# Patient Record
Sex: FEMALE | Race: WHITE | Employment: OTHER | URBAN - METROPOLITAN AREA
[De-identification: names, ages, dates, MRNs, and addresses within clinical notes are randomized per-mention and may not be internally consistent; named-entity substitution may affect disease eponyms.]

---

## 2018-09-19 RX ORDER — LORAZEPAM 0.5 MG/1
0.5 TABLET ORAL
COMMUNITY
End: 2020-07-22 | Stop reason: CLARIF

## 2018-09-20 ENCOUNTER — HOSPITAL ENCOUNTER (OUTPATIENT)
Age: 78
Setting detail: OUTPATIENT SURGERY
Discharge: HOME | End: 2018-09-20
Attending: SPECIALIST | Admitting: SPECIALIST
Payer: MEDICARE

## 2018-09-20 ENCOUNTER — ANESTHESIA EVENT (OUTPATIENT)
Dept: ENDOSCOPY | Age: 78
End: 2018-09-20
Payer: MEDICARE

## 2018-09-20 ENCOUNTER — ANESTHESIA (OUTPATIENT)
Dept: ENDOSCOPY | Age: 78
End: 2018-09-20
Payer: MEDICARE

## 2018-09-20 VITALS
WEIGHT: 119.19 LBS | HEART RATE: 67 BPM | BODY MASS INDEX: 20.35 KG/M2 | DIASTOLIC BLOOD PRESSURE: 64 MMHG | TEMPERATURE: 97.5 F | RESPIRATION RATE: 19 BRPM | OXYGEN SATURATION: 100 % | SYSTOLIC BLOOD PRESSURE: 134 MMHG | HEIGHT: 64 IN

## 2018-09-20 PROCEDURE — 88305 TISSUE EXAM BY PATHOLOGIST: CPT | Performed by: SPECIALIST

## 2018-09-20 PROCEDURE — 74011000250 HC RX REV CODE- 250

## 2018-09-20 PROCEDURE — 74011250636 HC RX REV CODE- 250/636: Performed by: SPECIALIST

## 2018-09-20 PROCEDURE — 76060000032 HC ANESTHESIA 0.5 TO 1 HR: Performed by: SPECIALIST

## 2018-09-20 PROCEDURE — 77030019988 HC FCPS ENDOSC DISP BSC -B: Performed by: SPECIALIST

## 2018-09-20 PROCEDURE — 76040000007: Performed by: SPECIALIST

## 2018-09-20 PROCEDURE — 77030013992 HC SNR POLYP ENDOSC BSC -B: Performed by: SPECIALIST

## 2018-09-20 PROCEDURE — 74011250636 HC RX REV CODE- 250/636

## 2018-09-20 RX ORDER — EPINEPHRINE 0.1 MG/ML
1 INJECTION INTRACARDIAC; INTRAVENOUS
Status: DISCONTINUED | OUTPATIENT
Start: 2018-09-20 | End: 2018-09-20 | Stop reason: HOSPADM

## 2018-09-20 RX ORDER — MIDAZOLAM HYDROCHLORIDE 1 MG/ML
.25-5 INJECTION, SOLUTION INTRAMUSCULAR; INTRAVENOUS
Status: DISCONTINUED | OUTPATIENT
Start: 2018-09-20 | End: 2018-09-20 | Stop reason: HOSPADM

## 2018-09-20 RX ORDER — FENTANYL CITRATE 50 UG/ML
50 INJECTION, SOLUTION INTRAMUSCULAR; INTRAVENOUS
Status: DISCONTINUED | OUTPATIENT
Start: 2018-09-20 | End: 2018-09-20 | Stop reason: HOSPADM

## 2018-09-20 RX ORDER — SODIUM CHLORIDE 0.9 % (FLUSH) 0.9 %
5-10 SYRINGE (ML) INJECTION AS NEEDED
Status: DISCONTINUED | OUTPATIENT
Start: 2018-09-20 | End: 2018-09-20 | Stop reason: HOSPADM

## 2018-09-20 RX ORDER — GLYCOPYRROLATE 0.2 MG/ML
INJECTION INTRAMUSCULAR; INTRAVENOUS AS NEEDED
Status: DISCONTINUED | OUTPATIENT
Start: 2018-09-20 | End: 2018-09-20 | Stop reason: HOSPADM

## 2018-09-20 RX ORDER — PROPOFOL 10 MG/ML
INJECTION, EMULSION INTRAVENOUS AS NEEDED
Status: DISCONTINUED | OUTPATIENT
Start: 2018-09-20 | End: 2018-09-20 | Stop reason: HOSPADM

## 2018-09-20 RX ORDER — NALOXONE HYDROCHLORIDE 0.4 MG/ML
0.4 INJECTION, SOLUTION INTRAMUSCULAR; INTRAVENOUS; SUBCUTANEOUS
Status: DISCONTINUED | OUTPATIENT
Start: 2018-09-20 | End: 2018-09-20 | Stop reason: SDUPTHER

## 2018-09-20 RX ORDER — LIDOCAINE HYDROCHLORIDE 20 MG/ML
INJECTION, SOLUTION EPIDURAL; INFILTRATION; INTRACAUDAL; PERINEURAL AS NEEDED
Status: DISCONTINUED | OUTPATIENT
Start: 2018-09-20 | End: 2018-09-20 | Stop reason: HOSPADM

## 2018-09-20 RX ORDER — ATROPINE SULFATE 0.1 MG/ML
0.5 INJECTION INTRAVENOUS
Status: DISCONTINUED | OUTPATIENT
Start: 2018-09-20 | End: 2018-09-20 | Stop reason: HOSPADM

## 2018-09-20 RX ORDER — FLUMAZENIL 0.1 MG/ML
0.2 INJECTION INTRAVENOUS
Status: DISCONTINUED | OUTPATIENT
Start: 2018-09-20 | End: 2018-09-20 | Stop reason: SDUPTHER

## 2018-09-20 RX ORDER — MIDAZOLAM HYDROCHLORIDE 1 MG/ML
.25-5 INJECTION, SOLUTION INTRAMUSCULAR; INTRAVENOUS
Status: DISCONTINUED | OUTPATIENT
Start: 2018-09-20 | End: 2018-09-20 | Stop reason: SDUPTHER

## 2018-09-20 RX ORDER — FLUMAZENIL 0.1 MG/ML
0.2 INJECTION INTRAVENOUS
Status: DISCONTINUED | OUTPATIENT
Start: 2018-09-20 | End: 2018-09-20 | Stop reason: HOSPADM

## 2018-09-20 RX ORDER — SODIUM CHLORIDE 0.9 % (FLUSH) 0.9 %
5-10 SYRINGE (ML) INJECTION EVERY 8 HOURS
Status: DISCONTINUED | OUTPATIENT
Start: 2018-09-20 | End: 2018-09-20 | Stop reason: HOSPADM

## 2018-09-20 RX ORDER — NALOXONE HYDROCHLORIDE 0.4 MG/ML
0.4 INJECTION, SOLUTION INTRAMUSCULAR; INTRAVENOUS; SUBCUTANEOUS
Status: DISCONTINUED | OUTPATIENT
Start: 2018-09-20 | End: 2018-09-20 | Stop reason: HOSPADM

## 2018-09-20 RX ORDER — SODIUM CHLORIDE 9 MG/ML
100 INJECTION, SOLUTION INTRAVENOUS CONTINUOUS
Status: DISCONTINUED | OUTPATIENT
Start: 2018-09-20 | End: 2018-09-20 | Stop reason: SDUPTHER

## 2018-09-20 RX ORDER — DEXTROMETHORPHAN/PSEUDOEPHED 2.5-7.5/.8
1.2 DROPS ORAL
Status: DISCONTINUED | OUTPATIENT
Start: 2018-09-20 | End: 2018-09-20 | Stop reason: HOSPADM

## 2018-09-20 RX ADMIN — PROPOFOL 50 MG: 10 INJECTION, EMULSION INTRAVENOUS at 09:46

## 2018-09-20 RX ADMIN — PROPOFOL 30 MG: 10 INJECTION, EMULSION INTRAVENOUS at 09:54

## 2018-09-20 RX ADMIN — SODIUM CHLORIDE 100 ML/HR: 900 INJECTION, SOLUTION INTRAVENOUS at 09:41

## 2018-09-20 RX ADMIN — PROPOFOL 30 MG: 10 INJECTION, EMULSION INTRAVENOUS at 09:51

## 2018-09-20 RX ADMIN — PROPOFOL 30 MG: 10 INJECTION, EMULSION INTRAVENOUS at 10:05

## 2018-09-20 RX ADMIN — PROPOFOL 30 MG: 10 INJECTION, EMULSION INTRAVENOUS at 09:49

## 2018-09-20 RX ADMIN — PROPOFOL 30 MG: 10 INJECTION, EMULSION INTRAVENOUS at 10:02

## 2018-09-20 RX ADMIN — PROPOFOL 30 MG: 10 INJECTION, EMULSION INTRAVENOUS at 10:00

## 2018-09-20 RX ADMIN — LIDOCAINE HYDROCHLORIDE 20 MG: 20 INJECTION, SOLUTION EPIDURAL; INFILTRATION; INTRACAUDAL; PERINEURAL at 09:46

## 2018-09-20 RX ADMIN — PROPOFOL 30 MG: 10 INJECTION, EMULSION INTRAVENOUS at 10:10

## 2018-09-20 RX ADMIN — PROPOFOL 30 MG: 10 INJECTION, EMULSION INTRAVENOUS at 09:58

## 2018-09-20 RX ADMIN — PROPOFOL 30 MG: 10 INJECTION, EMULSION INTRAVENOUS at 10:16

## 2018-09-20 RX ADMIN — GLYCOPYRROLATE 0.2 MG: 0.2 INJECTION INTRAMUSCULAR; INTRAVENOUS at 10:03

## 2018-09-20 RX ADMIN — PROPOFOL 30 MG: 10 INJECTION, EMULSION INTRAVENOUS at 09:56

## 2018-09-20 RX ADMIN — PROPOFOL 30 MG: 10 INJECTION, EMULSION INTRAVENOUS at 09:52

## 2018-09-20 NOTE — PROCEDURES
Colonoscopy    Indications: family history colon cancer    Pre-operative Diagnosis: see above    Medications:  See anesthesia form    Post-operative Diagnosis:  Possible sigmoid polyp, could not be seen again to be removed. Procedure Details   Prior to the procedure its objectives, risks, consequences and alternatives were discussed with the patient who then elected to proceed. All questions were answered. Digital Rectal Exam:  was normal     The Olympus videocolonoscope was inserted in the rectum and advanced to the cecum. The cecum was identified by typical landmarks. The colonoscope was slowly and carefully withdrawn as the mucosa was inspected. Initially I had seen a six mm sessile polyp at about 35 cm in the sigmoid colon. Upon removal of the scope, this polyp could not be seen. No other abnormalities were noted. Retroflexion in the rectum was not performed due to small rectal vault. Photos to document the ileocecal valve, appendiceal orifice and retroflexion exam were obtained. The preparation was adequate      Estimated Blood Loss:  none    Specimens:  none    Findings:  Possible sigmoid polyp, not re-located    Complications:  none    Repeat colonoscopy is recommended in: Three years.                Last Alvarenga MD  10:18 AM  9/20/2018

## 2018-09-20 NOTE — ROUTINE PROCESS
Cindi Presser  1940  [id number]    Situation:  Verbal report received from: Loli Bell RN  Procedure: Procedure(s):  ESOPHAGOGASTRODUODENOSCOPY (EGD)  COLONOSCOPY  ESOPHAGOGASTRODUODENAL (EGD) BIOPSY  ENDOSCOPIC POLYPECTOMY    Background:    Preoperative diagnosis: epigastric pain, history of peptic ulcer, gerd, abnormal weight loss  Postoperative diagnosis: duodenal polyp; possible sigmoid colon polyp    :  Dr. Kiersten Gomez  Assistant(s): Endoscopy RN-1: Rena Dang; Patricia De La Rosa RN    Specimens:   ID Type Source Tests Collected by Time Destination   1 : duodenum polyp and biopsy for pathology Preservative Duodenum  Lennie Mckeon MD 9/20/2018 1004 Pathology   2 : gastric biopsy for pathology Preservative Gastric  Lennie Mckeon MD 9/20/2018 1007 Pathology   3 : mid esophagus biopsy for pathology Preservative Esophagus, Mid  Lennie Mckeon MD 9/20/2018 1007 Pathology     H. Pylori  no    Assessment:  Intra-procedure medications       Anesthesia gave intra-procedure sedation and medications, see anesthesia flow sheet yes    Intravenous fluids: NS@ KVO     Vital signs stable       Abdominal assessment: round and soft       Recommendation:  Discharge patient per MD order    Family or Chucky Pak   Permission to share finding with family or friend yes

## 2018-09-20 NOTE — H&P
Pre-endoscopy H and P    The patient was seen and examined in the endoscopy suite. The airway was assessed and docuemented. The problem list, past medical history, and medications were reviewed. Patient Active Problem List   Diagnosis Code    Family history of colon cancer requiring screening colonoscopy Z80.0    Dyspepsia R10.13     Social History     Social History    Marital status:      Spouse name: N/A    Number of children: N/A    Years of education: N/A     Occupational History    Not on file. Social History Main Topics    Smoking status: Never Smoker    Smokeless tobacco: Never Used    Alcohol use No    Drug use: No    Sexual activity: Not on file     Other Topics Concern    Not on file     Social History Narrative     Past Medical History:   Diagnosis Date    Arthritis     Family history of colon cancer requiring screening colonoscopy 1/29/2013    GERD (gastroesophageal reflux disease)     PUD (peptic ulcer disease)      The patient has a family history of na    Prior to Admission Medications   Prescriptions Last Dose Informant Patient Reported? Taking? BISOPROLOL FUMARATE PO   Yes Yes   Sig: Take 2.5 mg by mouth daily. CALCIUM CARBONATE/VITAMIN D3 (CALCIUM + D PO)   Yes No   Sig: Take 1 Tab by mouth daily. LORazepam (ATIVAN) 0.5 mg tablet   Yes Yes   Sig: Take 0.5 mg by mouth nightly. fish oil-omega-3 fatty acids (FISH OIL) 340-1,000 mg capsule   Yes Yes   Sig: Take 1 Cap by mouth daily. multivitamins-minerals-lutein (CENTRUM SILVER) Tab   Yes No   Sig: Take 1 Tab by mouth every other day. pantoprazole (PROTONIX) 40 mg tablet   No No   Sig: TAKE 1 TABLET DAILY   Patient taking differently: TAKE 1 TABLET EVERY OTHER DAY   ranitidine (ZANTAC) 75 mg tablet   Yes No   Sig: Take 75 mg by mouth two (2) times a day.  Once every other day      Facility-Administered Medications: None           The review of systems is:  negative for shortness of breath or chest pain      The heart, lungs, and mental status were satisfactory for the administration of anesthesia sedation and for the procedure. The history is: Mother had colon cancer. Patient's last colonoscopy was 1/29/13 by Dr. Darcy Mckeon and was negative for polyps. 5 yr recall was recommended. She also had colonoscopy neg for polyps in 2007. BM's are good. No blood in the stool. No anemia. She has had gastric ulcers due to Excedrin use in the past in 2005 and 2007. No hx of h. pylori. EGD in 2013 negative. She has also been treated for GERD. She alternates between Protonix and OTC Zantac ever other day due to concerns of osteoporosis. She has osteopenia. A few weeks ago, she developed a burning in her stomach/epigastrium. Denies NSAID use presently. May be worse after eating certain foods, ie, strawberries, oranges. Also wonders if it may be due to stress as she's recently started estate planning. It hasn't happened that often and isn't that bad. It lasts a short time. She has not taken anything for it. The most coffee she drinks is 2 cups per day. ETOH use is erratic and never more than 1 glass at a time. She went back to Protonix daily 1 week ago. She's had 1 incident in the past week. Has lost weight, 5-10 lbs in the past 6 months unintentionally. It did start after eliminating cookies and cakes for [employer]le[employer]ells and she did regain a little of it. Occasionally gets a slight pain in her chest. Occasional hoarseness. Saw ENT in 2015 for chronic cough. He recommended elevating HOB and attributed cough to GERD plus post nasal drip. Cough now is occasional. Worse in AM and when going to bed. Worse after grainy foods, ie, nuts, breads. No choking or dysphagia    I discussed with the patient the objectives, risks, consequences and alternatives to the procedure.       Kylie Cheng MD  9/20/2018  9:32 AM

## 2018-09-20 NOTE — ANESTHESIA PREPROCEDURE EVALUATION
Anesthetic History No history of anesthetic complications Review of Systems / Medical History Patient summary reviewed, nursing notes reviewed and pertinent labs reviewed Pulmonary Within defined limits Neuro/Psych Within defined limits Cardiovascular Within defined limits Exercise tolerance: >4 METS 
  
GI/Hepatic/Renal 
Within defined limits GERD 
 
 
PUD Endo/Other Within defined limits Arthritis Other Findings Physical Exam 
 
Airway Mallampati: II 
TM Distance: 4 - 6 cm Neck ROM: normal range of motion Mouth opening: Normal 
 
 Cardiovascular Regular rate and rhythm,  S1 and S2 normal,  no murmur, click, rub, or gallop Dental 
No notable dental hx Pulmonary Breath sounds clear to auscultation Abdominal 
GI exam deferred Other Findings Anesthetic Plan ASA: 2 Anesthesia type: general and total IV anesthesia Induction: Intravenous Anesthetic plan and risks discussed with: Patient Propofol MAC

## 2018-09-20 NOTE — DISCHARGE INSTRUCTIONS
Lily Keenan  [id number]  1940              Procedure  Discharge Instructions:      Discomfort:  Redness at IV site- apply warm compress to area; if redness or soreness persist- contact your physician  There may be a slight amount of blood passed from the rectum  Gaseous discomfort- walking, belching will help relieve any discomfort  You may not operate a vehicle for 12 hours  You may not engage in an occupation involving machinery or appliances for rest of today  You may not drink alcoholic beverages for at least 12 hours  Avoid making any critical decisions for at least 24 hour  DIET:   You may resume your normal diet today. You should not overeat or \"feast\" today as your abdomen may become distended or uncomfortable. MEDICATIONS:   I reconciled this list from the list you gave us when you came today for the procedure. Please clarify with me, your primary care physician and the nurse who is discharging you if we have any discrepancies. Aspirin and or non-steroidal medication (Ibuprofen, Motrin, naproxen, etc.) is ok in limited quantities. ACTIVITY:  You may resume your normal daily activities it is recommended that you spend the remainder of the day resting -  avoid any strenuous activity. CALL M.D. ANY SIGN OF:  Increasing pain, nausea, vomiting  Abdominal distension (swelling)  New increased bleeding (oral or rectal)  Fever (chills)  Pain in chest area  Bloody discharge from nose or mouth  Shortness of breath          Follow-up Instructions:   Call Dr. Vipin Berkowitz for the results of  biopsy in approximately one week  Telephone #  [phone]  Follow up visit as needed; recall 3 years.       Anitha Teresa MD  10:20 AM  9/20/2018

## 2018-09-20 NOTE — ANESTHESIA POSTPROCEDURE EVALUATION
Post-Anesthesia Evaluation and Assessment Patient: Breanne Johnson MRN: [id number]  SSN: [id number] YOB: 1940  Age: 70 y.o. Sex: female Cardiovascular Function/Vital Signs Visit Vitals  /64  Pulse 67  Temp 36.4 °C (97.5 °F)  Resp 19  
 Ht 5' 4\" (1.626 m)  Wt 54.1 kg (119 lb 3 oz)  SpO2 100%  Breastfeeding No  
 BMI 20.46 kg/m2 Patient is status post general, total IV anesthesia anesthesia for Procedure(s): ESOPHAGOGASTRODUODENOSCOPY (EGD) COLONOSCOPY 
ESOPHAGOGASTRODUODENAL (EGD) BIOPSY ENDOSCOPIC POLYPECTOMY. Nausea/Vomiting: None Postoperative hydration reviewed and adequate. Pain: 
Pain Scale 1: Numeric (0 - 10) (09/20/18 1047) Pain Intensity 1: 0 (09/20/18 1047) Managed Neurological Status: At baseline Mental Status and Level of Consciousness: Arousable Pulmonary Status:  
O2 Device: Room air (09/20/18 1049) Adequate oxygenation and airway patent Complications related to anesthesia: None Post-anesthesia assessment completed. No concerns Signed By: Perla Flaherty MD   
 September 20, 2018

## 2018-09-20 NOTE — PROCEDURES
Esophagogastroduodenoscopy    Indications:  Epigastric pain  Diogenes symptoms    Medications:  See anesthesia form    Post procedure diagnosis:  8mm duodenal polyp; otherwise negative exam  Description of Procedure:    Prior to the procedure its objectives, risks, consequences and alternatives were discussed with the patient who then elected to proceed. The Olympus video endoscope was inserted under direct vision into the mouth and then into the esophagus. The esophagus looked normal.    The z-line was located at 39cm. There were no diagnostic abnormalities of the body, fundus, antrum, cardia and incisura of the stomach. This included direct and retroflexion examination. The mucosa of the first and second portion of the duodenum appeared normal.  There was an 8mm sessile polyp in the second portion of the duodenum. I took biopsies of the duodenum (including the polyp), stomach, and the mid-esophagus. Complications: There were no apparent complications and the patient tolerated the procedure well.         Estimated Blood Loss:  none  Specimens Removed:  Duodenum and polyp  Stomach  Mid esophagus  Impressions:  See above       Signed By: Yeison Noel MD                        September 20, 2018     9:59 AM

## 2018-09-20 NOTE — IP AVS SNAPSHOT
[address] [facility] 
[phone] Patient: Sergio Awan MRN: [id number] QTK:1[birth year]33/[birth year]73 About your hospitalization You were admitted on:  September 20, 2018 You last received care in the:  [facility] ENDOSCOPY You were discharged on:  September 20, 2018 Why you were hospitalized Your primary diagnosis was:  Not on File Your diagnoses also included:  Family History Of Colon Cancer Requiring Screening Colonoscopy, Dyspepsia Follow-up Information Follow up With Details Comments Contact Info Kranthi Savage MD     
  
Discharge Orders None A check mike indicates which time of day the medication should be taken. My Medications CHANGE how you take these medications Instructions Each Dose to Equal  
 Morning Noon Evening Bedtime  
 pantoprazole 40 mg tablet Commonly known as:  PROTONIX What changed:  See the new instructions. Your last dose was: Your next dose is: TAKE 1 TABLET DAILY CONTINUE taking these medications Instructions Each Dose to Equal  
 Morning Noon Evening Bedtime BISOPROLOL FUMARATE PO Your last dose was: Your next dose is: Take 2.5 mg by mouth daily. 2.5 mg  
    
   
   
   
  
 CALCIUM + D PO Your last dose was: Your next dose is: Take 1 Tab by mouth daily. 1 Tab CENTRUM SILVER Tab tablet Generic drug:  multivitamins-minerals-lutein Your last dose was: Your next dose is: Take 1 Tab by mouth every other day. 1 Tab FISH -1,000 mg capsule Generic drug:  fish oil-omega-3 fatty acids Your last dose was: Your next dose is: Take 1 Cap by mouth daily. 1 Cap LORazepam 0.5 mg tablet Commonly known as:  ATIVAN  
   
 Your last dose was: Your next dose is: Take 0.5 mg by mouth nightly. 0.5 mg  
    
   
   
   
  
 raNITIdine 75 mg Tab Commonly known as:  ZANTAC Your last dose was: Your next dose is: Take 75 mg by mouth two (2) times a day. Once every other day 75 mg Discharge Instructions Sloane Cartagena 
[id number] 
1940 Procedure  Discharge Instructions:   
 
Discomfort: 
Redness at IV site- apply warm compress to area; if redness or soreness persist- contact your physician There may be a slight amount of blood passed from the rectum Gaseous discomfort- walking, belching will help relieve any discomfort You may not operate a vehicle for 12 hours You may not engage in an occupation involving machinery or appliances for rest of today You may not drink alcoholic beverages for at least 12 hours Avoid making any critical decisions for at least 24 hour DIET: 
 You may resume your normal diet today. You should not overeat or \"feast\" today as your abdomen may become distended or uncomfortable. MEDICATIONS: 
 I reconciled this list from the list you gave us when you came today for the procedure. Please clarify with me, your primary care physician and the nurse who is discharging you if we have any discrepancies. Aspirin and or non-steroidal medication (Ibuprofen, Motrin, naproxen, etc.) is ok in limited quantities. ACTIVITY: 
You may resume your normal daily activities it is recommended that you spend the remainder of the day resting -  avoid any strenuous activity. CALL M.D. ANY SIGN OF: Increasing pain, nausea, vomiting Abdominal distension (swelling) New increased bleeding (oral or rectal) Fever (chills) Pain in chest area Bloody discharge from nose or mouth Shortness of breath Follow-up Instructions: 
 Call Dr. Finesse Bello for the results of  biopsy in approximately one week Telephone #  [phone] Follow up visit as needed; recall 3 years. Uday Hewitt MD 
10:20 AM 
9/20/2018 Introducing [facility]! Dear Adriana Clark: Thank you for requesting a K[employer] account. Our records indicate that you already have an active Kryptiq account. You can access your account anytime at [email or web address] Did you know that you can access your hospital and ER discharge instructions at any time in Kryptiq? You can also review all of your test results from your hospital stay or ER visit. Additional Information If you have questions, please visit the Frequently Asked Questions section of the Kryptiq website at [email or web address] Remember, Kryptiq is NOT to be used for urgent needs. For medical emergencies, dial 911. Now available from your iPhone and Android! Introducing Joe Apodaca As a [employer] patient, I wanted to make you aware of our electronic visit tool called Joe Apodaca. [employer] 24/7 allows you to connect within minutes with a medical provider 24 hours a day, seven days a week via a mobile device or tablet or logging into a secure website from your computer. You can access Joe Apodaca from anywhere in the [place]. A virtual visit might be right for you when you have a simple condition and feel like you just dont want to get out of bed, or cant get away from work for an appointment, when your regular [employer] provider is not available (evenings, weekends or holidays), or when youre out of town and need minor care. Electronic visits cost only $49 and if the [employer] 24/7 provider determines a prescription is needed to treat your condition, one can be electronically transmitted to a nearby pharmacy*. Please take a moment to enroll today if you have not already done so. The enrollment process is free and takes just a few minutes.   To enroll, please download the [employer] 24/7 joseph to your tablet or phone, or visit [email or web address] to enroll on your computer. And, as an [address] [place] [address] patient with a [employer] account, the results of your visits will be scanned into your electronic medical record and your primary care provider will be able to view the scanned results. We urge you to continue to see your regular [employer] provider for your ongoing medical care. And while your primary care provider may not be the one available when you seek a [employer] virtual visit, the peace of mind you get from getting a real diagnosis real time can be priceless. For more information on NEXAGE, view our Frequently Asked Questions (FAQs) at [email or web address]. Sincerely, 
 
Jolly Basurto MD 
Chief Medical Officer [facility] Victorina Bhanu *:  certain medications cannot be prescribed via NEXAGE Providers Seen During Your Hospitalization Provider Specialty Primary office phone Kylie Cheng MD Gastroenterology [phone] Your Primary Care Physician (PCP) Primary Care Physician Office Phone Office Fax AZIZA TUCKER ** None ** ** None ** You are allergic to the following Allergen Reactions Doxycycline Other (comments) Difficulty swallowing Sulfa (Sulfonamide Antibiotics) Unknown (comments) Unknown reaction in past  
    
 Fiorinal-Codeine #3 (Codeine-Butalbital-Asa-Caff) Rash Recent Documentation Height Weight Breastfeeding? BMI OB Status Smoking Status 1.626 m 54.1 kg No 20.46 kg/m2 Postmenopausal Never Smoker Emergency Contacts Name Discharge Info Relation Home Work Mobile Shahbaz Zepeda  Spouse [3] [phone] Patient Belongings The following personal items are in your possession at time of discharge: 
  Dental Appliances: None  Visual Aid: Glasses (with ) Please provide this summary of care documentation to your next provider. Signatures-by signing, you are acknowledging that this After Visit Summary has been reviewed with you and you have received a copy. Patient Signature:  ____________________________________________________________ Date:  ____________________________________________________________  
  
[facility] Provider Signature:  ____________________________________________________________ Date:  ____________________________________________________________

## 2018-10-30 RX ORDER — PANTOPRAZOLE SODIUM 40 MG/1
40 TABLET, DELAYED RELEASE ORAL DAILY
COMMUNITY
End: 2020-05-28

## 2018-10-31 ENCOUNTER — ANESTHESIA (OUTPATIENT)
Dept: ENDOSCOPY | Age: 78
End: 2018-10-31
Payer: MEDICARE

## 2018-10-31 ENCOUNTER — APPOINTMENT (OUTPATIENT)
Dept: ULTRASOUND IMAGING | Age: 78
End: 2018-10-31
Attending: INTERNAL MEDICINE
Payer: MEDICARE

## 2018-10-31 ENCOUNTER — ANESTHESIA EVENT (OUTPATIENT)
Dept: ENDOSCOPY | Age: 78
End: 2018-10-31
Payer: MEDICARE

## 2018-10-31 ENCOUNTER — HOSPITAL ENCOUNTER (OUTPATIENT)
Age: 78
Setting detail: OUTPATIENT SURGERY
Discharge: HOME | End: 2018-10-31
Attending: INTERNAL MEDICINE | Admitting: INTERNAL MEDICINE
Payer: MEDICARE

## 2018-10-31 VITALS
SYSTOLIC BLOOD PRESSURE: 136 MMHG | HEART RATE: 88 BPM | BODY MASS INDEX: 20.49 KG/M2 | OXYGEN SATURATION: 100 % | RESPIRATION RATE: 18 BRPM | HEIGHT: 64 IN | DIASTOLIC BLOOD PRESSURE: 88 MMHG | TEMPERATURE: 97.5 F | WEIGHT: 120 LBS

## 2018-10-31 PROCEDURE — 77030036911 HC NDL INJ ENTERSCP FORCE MAX OCOA -B: Performed by: INTERNAL MEDICINE

## 2018-10-31 PROCEDURE — 74011250636 HC RX REV CODE- 250/636

## 2018-10-31 PROCEDURE — 77030013992 HC SNR POLYP ENDOSC BSC -B: Performed by: INTERNAL MEDICINE

## 2018-10-31 PROCEDURE — 77030010936 HC CLP LIG BSC -C: Performed by: INTERNAL MEDICINE

## 2018-10-31 PROCEDURE — 76060000032 HC ANESTHESIA 0.5 TO 1 HR: Performed by: INTERNAL MEDICINE

## 2018-10-31 PROCEDURE — 77030038665 HC SOL ELEVIEW INJ AGNT ARPH -B: Performed by: INTERNAL MEDICINE

## 2018-10-31 PROCEDURE — 76040000007: Performed by: INTERNAL MEDICINE

## 2018-10-31 PROCEDURE — 88305 TISSUE EXAM BY PATHOLOGIST: CPT | Performed by: INTERNAL MEDICINE

## 2018-10-31 RX ORDER — NALOXONE HYDROCHLORIDE 0.4 MG/ML
0.4 INJECTION, SOLUTION INTRAMUSCULAR; INTRAVENOUS; SUBCUTANEOUS
Status: DISCONTINUED | OUTPATIENT
Start: 2018-10-31 | End: 2018-10-31 | Stop reason: HOSPADM

## 2018-10-31 RX ORDER — MIDAZOLAM HYDROCHLORIDE 1 MG/ML
.25-1 INJECTION, SOLUTION INTRAMUSCULAR; INTRAVENOUS
Status: DISCONTINUED | OUTPATIENT
Start: 2018-10-31 | End: 2018-10-31 | Stop reason: HOSPADM

## 2018-10-31 RX ORDER — SODIUM CHLORIDE 9 MG/ML
50 INJECTION, SOLUTION INTRAVENOUS CONTINUOUS
Status: DISCONTINUED | OUTPATIENT
Start: 2018-10-31 | End: 2018-10-31 | Stop reason: HOSPADM

## 2018-10-31 RX ORDER — SODIUM CHLORIDE 0.9 % (FLUSH) 0.9 %
5-10 SYRINGE (ML) INJECTION EVERY 8 HOURS
Status: DISCONTINUED | OUTPATIENT
Start: 2018-10-31 | End: 2018-10-31 | Stop reason: HOSPADM

## 2018-10-31 RX ORDER — PROPOFOL 10 MG/ML
INJECTION, EMULSION INTRAVENOUS AS NEEDED
Status: DISCONTINUED | OUTPATIENT
Start: 2018-10-31 | End: 2018-10-31 | Stop reason: HOSPADM

## 2018-10-31 RX ORDER — ATROPINE SULFATE 0.1 MG/ML
0.5 INJECTION INTRAVENOUS
Status: DISCONTINUED | OUTPATIENT
Start: 2018-10-31 | End: 2018-10-31 | Stop reason: HOSPADM

## 2018-10-31 RX ORDER — FLUMAZENIL 0.1 MG/ML
0.2 INJECTION INTRAVENOUS
Status: DISCONTINUED | OUTPATIENT
Start: 2018-10-31 | End: 2018-10-31 | Stop reason: HOSPADM

## 2018-10-31 RX ORDER — SODIUM CHLORIDE 9 MG/ML
INJECTION, SOLUTION INTRAVENOUS
Status: DISCONTINUED | OUTPATIENT
Start: 2018-10-31 | End: 2018-10-31 | Stop reason: HOSPADM

## 2018-10-31 RX ORDER — LIDOCAINE HYDROCHLORIDE 20 MG/ML
INJECTION, SOLUTION EPIDURAL; INFILTRATION; INTRACAUDAL; PERINEURAL AS NEEDED
Status: DISCONTINUED | OUTPATIENT
Start: 2018-10-31 | End: 2018-10-31 | Stop reason: HOSPADM

## 2018-10-31 RX ORDER — EPINEPHRINE 0.1 MG/ML
1 INJECTION INTRACARDIAC; INTRAVENOUS
Status: DISCONTINUED | OUTPATIENT
Start: 2018-10-31 | End: 2018-10-31 | Stop reason: HOSPADM

## 2018-10-31 RX ORDER — FENTANYL CITRATE 50 UG/ML
100 INJECTION, SOLUTION INTRAMUSCULAR; INTRAVENOUS
Status: DISCONTINUED | OUTPATIENT
Start: 2018-10-31 | End: 2018-10-31 | Stop reason: HOSPADM

## 2018-10-31 RX ORDER — SODIUM CHLORIDE 0.9 % (FLUSH) 0.9 %
5-10 SYRINGE (ML) INJECTION AS NEEDED
Status: DISCONTINUED | OUTPATIENT
Start: 2018-10-31 | End: 2018-10-31 | Stop reason: HOSPADM

## 2018-10-31 RX ORDER — DEXTROMETHORPHAN/PSEUDOEPHED 2.5-7.5/.8
1.2 DROPS ORAL
Status: DISCONTINUED | OUTPATIENT
Start: 2018-10-31 | End: 2018-10-31 | Stop reason: HOSPADM

## 2018-10-31 RX ADMIN — PROPOFOL 50 MG: 10 INJECTION, EMULSION INTRAVENOUS at 13:56

## 2018-10-31 RX ADMIN — PROPOFOL 50 MG: 10 INJECTION, EMULSION INTRAVENOUS at 13:59

## 2018-10-31 RX ADMIN — PROPOFOL 50 MG: 10 INJECTION, EMULSION INTRAVENOUS at 13:52

## 2018-10-31 RX ADMIN — SODIUM CHLORIDE: 9 INJECTION, SOLUTION INTRAVENOUS at 13:05

## 2018-10-31 RX ADMIN — LIDOCAINE HYDROCHLORIDE 50 MG: 20 INJECTION, SOLUTION EPIDURAL; INFILTRATION; INTRACAUDAL; PERINEURAL at 13:49

## 2018-10-31 RX ADMIN — PROPOFOL 50 MG: 10 INJECTION, EMULSION INTRAVENOUS at 13:54

## 2018-10-31 RX ADMIN — PROPOFOL 50 MG: 10 INJECTION, EMULSION INTRAVENOUS at 13:50

## 2018-10-31 RX ADMIN — PROPOFOL 50 MG: 10 INJECTION, EMULSION INTRAVENOUS at 14:04

## 2018-10-31 NOTE — ANESTHESIA POSTPROCEDURE EVALUATION
Procedure(s): ESOPHAGOGASTRODUODENOSCOPY (EGD) ENDOSCOPIC MUCOSAL RESECTION INJECTION 
RESOLUTION CLIP. Anesthesia Post Evaluation Multimodal analgesia: multimodal analgesia not used between 6 hours prior to anesthesia start to PACU discharge Patient location during evaluation: PACU Patient participation: complete - patient participated Level of consciousness: awake Pain score: 0 Pain management: adequate Airway patency: patent Anesthetic complications: no 
Cardiovascular status: acceptable Respiratory status: acceptable Hydration status: acceptable Comments: I have evaluated the patient and meets criteria for discharge from PACU. Eri Jaffe MD 
 
 
 
Visit Vitals /88 Pulse 88 Temp 36.4 °C (97.5 °F) Resp 18 Ht 5' 4\" (1.626 m) Wt 54.4 kg (120 lb) SpO2 100% Breastfeeding? No  
BMI 20.60 kg/m²

## 2018-10-31 NOTE — PROGRESS NOTES

## 2018-10-31 NOTE — PROGRESS NOTES
Jelani Mali 1940 
[id number] Situation: 
Verbal report received from: [facility] AT [facility] 
Procedure: Procedure(s): ESOPHAGOGASTRODUODENOSCOPY (EGD) ENDOSCOPIC MUCOSAL RESECTION INJECTION 
RESOLUTION CLIP Background: 
 
Preoperative diagnosis: 1.- DUODENAL POLYPECTOMY Postoperative diagnosis: 1.- Duodenal Polyp :  Dr. Shiela Pineda 
Assistant(s): Endoscopy Technician-1: Casandra Arredondo Endoscopy RN-1: Rachelle Molina RN Specimens:  
ID Type Source Tests Collected by Time Destination 1 : Duodenal Polyp Preservative   Elsa Carey MD 10/31/2018 1402 Pathology H. Pylori  no Assessment: 
Intra-procedure medications Anesthesia gave intra-procedure sedation and medications, see anesthesia flow sheet yes Intravenous fluids: NS@ [address] Vital signs stable  yes Abdominal assessment: round and soft  yes Recommendation: 
Discharge patient per MD order yes. Family or Friend  yes Permission to share finding with family or friend yes

## 2018-10-31 NOTE — PROCEDURES
[address]  [address] [place]  [address], [address]   [phone]                            NAME:  Lauri Interiano   :   1940   MRN:   [id number]     Date/Time:  10/31/2018 2:11 PM    Esophagogastroduodenoscopy (EGD) Procedure Note    :  Reuben Coleman MD    Referring Provider:  Lico Campbell MD    Anethesia/Sedation:  MAC anesthesia    Procedure Details     After infom consent was obtained for the procedure, with all risks and benefits of procedure explained the patient was taken to the endoscopy suite and placed in the left lateral decubitus position. Following sequential administration of sedation as per above, the [id number] gastroscope was inserted into the mouth and advanced under direct vision to second portion of the duodenum. A careful inspection was made as the gastroscope was withdrawn, including a retroflexed view of the proximal stomach; findings and interventions are described below. Findings:  Esophagus:normal  Stomach:normal   Duodenum/jejunum: Small flat polyp with a scar in center from previous biopsy was seen in the second portion of duodenum on the posterior wall. This measured about 8 mm in size. Rest of the duodenum was normal      Therapies:  injection of 4 cc of Eleview was successful in lifting the lesion on both the sides of scar. The scar in the center did not lift well. 1 piece meal polypectomy were performed using hot snare  and the polyps were  retrieved   Two hemoclips were placed for hemostasis    Specimens: Duodenal polyp           EBL: Minimal    Complications:   None; patient tolerated the procedure well. Impression:    See Postoperative diagnosis above    Recommendations:  -Await pathology. , -Follow symptoms. , -Regular diet. , -No NSAIDS, -EGD surveillance in 3-6 months based upon the path results    Discharge disposition:  Home in the company of  when able to ambulate    Reuben Coleman MD

## 2018-10-31 NOTE — DISCHARGE INSTRUCTIONS
[address]  [address] [facility]  [phone]                     DISCHARGE INSTRUCTIONS    Carol Kowalski  [id number]  1940    DISCOMFORT:  Sore throat- warm salt water gargle  redness at IV site- apply warm compress to area; if redness or soreness persist- contact your physician  Gaseous discomfort- walking, belching will help relieve any discomfort  You may not operate a vehicle for 12 hours  You may not engage in an occupation involving machinery or appliances for rest of today  You may not drink alcoholic beverages for at least 12 hours  Avoid making any critical decisions for at least 24 hour  DIET  You may eat and drink after you leave. You may resume your regular diet - however -  remember your colon is empty and a heavy meal will produce gas. Avoid these foods:  vegetables, fried / greasy foods, carbonated drinks    ACTIVITY  You may resume your normal daily activities   Spend the remainder of the day resting -  avoid any strenuous activity. CALL M.D. ANY SIGN OF   Increasing pain, nausea, vomiting  Abdominal distension (swelling)  New increased bleeding (oral or rectal)  Fever (chills)  Bloody discharge from nose or mouth  Shortness of breath or chest pain call 911 then call Dr Ingrid Muro    Follow-up Instructions:   Call Dr. Ingrid Muro for any questions or problems. If we took a biopsy please call the office within 2 weeks to discuss your                             pathology results. Telephone # [phone]        Continue same medications. Await pathology. , -Follow symptoms. , -Regular diet. , -No NSAIDS, -EGD surveillance in 3-6 months based upon the path results

## 2018-10-31 NOTE — ANESTHESIA PREPROCEDURE EVALUATION
Anesthetic History No history of anesthetic complications Review of Systems / Medical History Patient summary reviewed, nursing notes reviewed and pertinent labs reviewed Pulmonary Within defined limits Neuro/Psych Within defined limits Cardiovascular Within defined limits Hypertension GI/Hepatic/Renal 
Within defined limits GERD 
 
 
PUD Endo/Other Within defined limits Arthritis Other Findings Physical Exam 
 
Airway Mallampati: II 
TM Distance: > 6 cm Neck ROM: normal range of motion Mouth opening: Normal 
 
 Cardiovascular Regular rate and rhythm,  S1 and S2 normal,  no murmur, click, rub, or gallop Dental 
No notable dental hx Pulmonary Breath sounds clear to auscultation Abdominal 
GI exam deferred Other Findings Anesthetic Plan ASA: 2 Anesthesia type: MAC Induction: Intravenous Anesthetic plan and risks discussed with: Patient

## 2018-10-31 NOTE — H&P
[address] [facility] 
[address] [place], [address] 
[phone] History and Physical  
 
NAME: Jelani Samson :  1940 MRN:  [id number] HPI:  The patient was seen and examined. Past Surgical History:  
Procedure Laterality Date  CHEST SURGERY PROCEDURE UNLISTED    
 removal of benign cyst from right lung  COLORECTAL SCRN; HI RISK IND  2018  HX APPENDECTOMY  HX DILATION AND CURETTAGE    
 miscarriage  HX TONSILLECTOMY  UPPER GI ENDOSCOPY,BIOPSY  2018 Past Medical History:  
Diagnosis Date  Arthritis  Chronic pain   
 arthritis/chronic headache daily  Family history of colon cancer requiring screening colonoscopy 2013  GERD (gastroesophageal reflux disease)  Hypertension  PUD (peptic ulcer disease) Social History Tobacco Use  Smoking status: Never Smoker  Smokeless tobacco: Never Used Substance Use Topics  Alcohol use: Yes Comment: occasional wine  Drug use: No  
 
Allergies Allergen Reactions  Doxycycline Other (comments) Difficulty swallowing  Sulfa (Sulfonamide Antibiotics) Unknown (comments) Unknown reaction in past  
 Fiorinal-Codeine #3 [Codeine-Butalbital-Asa-Caff] Rash Family History Problem Relation Age of Onset  Cancer Mother   
     colon  COPD Mother  Heart Disease Father  Other Father   
     arthritis Current Facility-Administered Medications Medication Dose Route Frequency  0.9% sodium chloride infusion  50 mL/hr IntraVENous CONTINUOUS  
 sodium chloride (NS) flush 5-10 mL  5-10 mL IntraVENous Q8H  
 sodium chloride (NS) flush 5-10 mL  5-10 mL IntraVENous PRN  
 midazolam (VERSED) injection 0.25-10 mg  0.25-10 mg IntraVENous Multiple  fentaNYL citrate (PF) injection 100 mcg  100 mcg IntraVENous MULTIPLE DOSE GIVEN  
  naloxone (NARCAN) injection 0.4 mg  0.4 mg IntraVENous Multiple  flumazenil (ROMAZICON) 0.1 mg/mL injection 0.2 mg  0.2 mg IntraVENous Multiple  simethicone (MYLICON) 42UJ/1.2XO oral drops 80 mg  1.2 mL Oral Multiple  atropine injection 0.5 mg  0.5 mg IntraVENous ONCE PRN  
 EPINEPHrine (ADRENALIN) 0.1 mg/mL syringe 1 mg  1 mg Endoscopically ONCE PRN Facility-Administered Medications Ordered in Other Encounters Medication Dose Route Frequency  0.9% sodium chloride infusion   IntraVENous CONTINUOUS  
 
 
 
PHYSICAL EXAM: 
General: WD, WN. Alert, cooperative, no acute distress   
HEENT: NC, Atraumatic. PERRLA, EOMI. Anicteric sclerae. Lungs:  CTA Bilaterally. No Wheezing/Rhonchi/Rales. Heart:  Regular  rhythm,  No murmur, No Rubs, No Gallops Abdomen: Soft, Non distended, Non tender.  +Bowel sounds, no HSM Extremities: No c/c/e Neurologic:  CN 2-12 gi, Alert and oriented X 3. No acute neurological distress Psych:   Good insight. Not anxious nor agitated. The heart, lungs and mental status were satisfactory for the administration of MAC sedation and for the procedure. Mallampati score: 2 Assessment:  
· Duodenal polyp Plan:  
· Endoscopic procedure · MAC sedation ·

## 2018-11-30 ENCOUNTER — HOSPITAL ENCOUNTER (OUTPATIENT)
Dept: MRI IMAGING | Age: 78
Discharge: HOME | End: 2018-11-30
Attending: PHYSICAL MEDICINE & REHABILITATION
Payer: MEDICARE

## 2018-11-30 DIAGNOSIS — M47.812 CERVICAL SPONDYLOSIS WITHOUT MYELOPATHY: ICD-10-CM

## 2018-11-30 PROCEDURE — 72141 MRI NECK SPINE W/O DYE: CPT

## 2019-02-28 ENCOUNTER — ANESTHESIA EVENT (OUTPATIENT)
Dept: ENDOSCOPY | Age: 79
End: 2019-02-28
Payer: MEDICARE

## 2019-02-28 ENCOUNTER — ANESTHESIA (OUTPATIENT)
Dept: ENDOSCOPY | Age: 79
End: 2019-02-28
Payer: MEDICARE

## 2019-02-28 ENCOUNTER — HOSPITAL ENCOUNTER (OUTPATIENT)
Age: 79
Setting detail: OUTPATIENT SURGERY
Discharge: HOME | End: 2019-02-28
Attending: SPECIALIST | Admitting: SPECIALIST
Payer: MEDICARE

## 2019-02-28 VITALS
SYSTOLIC BLOOD PRESSURE: 163 MMHG | RESPIRATION RATE: 14 BRPM | WEIGHT: 116.25 LBS | HEIGHT: 64 IN | BODY MASS INDEX: 19.85 KG/M2 | OXYGEN SATURATION: 100 % | TEMPERATURE: 97.5 F | HEART RATE: 60 BPM | DIASTOLIC BLOOD PRESSURE: 75 MMHG

## 2019-02-28 PROBLEM — R19.7 DIARRHEA: Status: ACTIVE | Noted: 2019-02-28

## 2019-02-28 PROCEDURE — 77030019988 HC FCPS ENDOSC DISP BSC -B: Performed by: SPECIALIST

## 2019-02-28 PROCEDURE — 76060000031 HC ANESTHESIA FIRST 0.5 HR: Performed by: SPECIALIST

## 2019-02-28 PROCEDURE — 74011250636 HC RX REV CODE- 250/636: Performed by: SPECIALIST

## 2019-02-28 PROCEDURE — 74011250636 HC RX REV CODE- 250/636

## 2019-02-28 PROCEDURE — C1726 CATH, BAL DIL, NON-VASCULAR: HCPCS | Performed by: SPECIALIST

## 2019-02-28 PROCEDURE — 76040000019: Performed by: SPECIALIST

## 2019-02-28 PROCEDURE — 77030018712 HC DEV BLLN INFL BSC -B: Performed by: SPECIALIST

## 2019-02-28 PROCEDURE — 88305 TISSUE EXAM BY PATHOLOGIST: CPT

## 2019-02-28 RX ORDER — ATROPINE SULFATE 0.1 MG/ML
0.5 INJECTION INTRAVENOUS
Status: DISCONTINUED | OUTPATIENT
Start: 2019-02-28 | End: 2019-02-28 | Stop reason: HOSPADM

## 2019-02-28 RX ORDER — EPINEPHRINE 0.1 MG/ML
1 INJECTION INTRACARDIAC; INTRAVENOUS
Status: DISCONTINUED | OUTPATIENT
Start: 2019-02-28 | End: 2019-02-28 | Stop reason: HOSPADM

## 2019-02-28 RX ORDER — PROPOFOL 10 MG/ML
INJECTION, EMULSION INTRAVENOUS AS NEEDED
Status: DISCONTINUED | OUTPATIENT
Start: 2019-02-28 | End: 2019-02-28 | Stop reason: HOSPADM

## 2019-02-28 RX ORDER — SODIUM CHLORIDE 9 MG/ML
100 INJECTION, SOLUTION INTRAVENOUS CONTINUOUS
Status: DISCONTINUED | OUTPATIENT
Start: 2019-02-28 | End: 2019-02-28 | Stop reason: HOSPADM

## 2019-02-28 RX ORDER — MIDAZOLAM HYDROCHLORIDE 1 MG/ML
.25-5 INJECTION, SOLUTION INTRAMUSCULAR; INTRAVENOUS
Status: DISCONTINUED | OUTPATIENT
Start: 2019-02-28 | End: 2019-02-28 | Stop reason: HOSPADM

## 2019-02-28 RX ORDER — DEXTROMETHORPHAN/PSEUDOEPHED 2.5-7.5/.8
1.2 DROPS ORAL
Status: DISCONTINUED | OUTPATIENT
Start: 2019-02-28 | End: 2019-02-28 | Stop reason: HOSPADM

## 2019-02-28 RX ORDER — SODIUM CHLORIDE 0.9 % (FLUSH) 0.9 %
5-40 SYRINGE (ML) INJECTION AS NEEDED
Status: DISCONTINUED | OUTPATIENT
Start: 2019-02-28 | End: 2019-02-28 | Stop reason: HOSPADM

## 2019-02-28 RX ORDER — FLUMAZENIL 0.1 MG/ML
0.2 INJECTION INTRAVENOUS
Status: DISCONTINUED | OUTPATIENT
Start: 2019-02-28 | End: 2019-02-28 | Stop reason: HOSPADM

## 2019-02-28 RX ORDER — NALOXONE HYDROCHLORIDE 0.4 MG/ML
0.4 INJECTION, SOLUTION INTRAMUSCULAR; INTRAVENOUS; SUBCUTANEOUS
Status: DISCONTINUED | OUTPATIENT
Start: 2019-02-28 | End: 2019-02-28 | Stop reason: HOSPADM

## 2019-02-28 RX ORDER — LIDOCAINE HYDROCHLORIDE 20 MG/ML
INJECTION, SOLUTION EPIDURAL; INFILTRATION; INTRACAUDAL; PERINEURAL AS NEEDED
Status: DISCONTINUED | OUTPATIENT
Start: 2019-02-28 | End: 2019-02-28 | Stop reason: HOSPADM

## 2019-02-28 RX ORDER — SODIUM CHLORIDE 0.9 % (FLUSH) 0.9 %
5-40 SYRINGE (ML) INJECTION EVERY 8 HOURS
Status: DISCONTINUED | OUTPATIENT
Start: 2019-02-28 | End: 2019-02-28 | Stop reason: HOSPADM

## 2019-02-28 RX ADMIN — PROPOFOL 160 MG: 10 INJECTION, EMULSION INTRAVENOUS at 11:17

## 2019-02-28 RX ADMIN — LIDOCAINE HYDROCHLORIDE 80 MG: 20 INJECTION, SOLUTION EPIDURAL; INFILTRATION; INTRACAUDAL; PERINEURAL at 10:56

## 2019-02-28 RX ADMIN — SODIUM CHLORIDE 100 ML/HR: 900 INJECTION, SOLUTION INTRAVENOUS at 10:41

## 2019-02-28 NOTE — ROUTINE PROCESS
Veena Ny 1940 
[id number] Situation: 
Verbal report received from: Matthew Dailey RN Procedure: Procedure(s): ESOPHAGOGASTRODUODENOSCOPY (EGD) ESOPHAGOGASTRODUODENAL (EGD) BIOPSY 
ESOPHAGEAL DILATION Background: 
 
Preoperative diagnosis: FAMILY HISTORY OF MALIGNANT NEOPLASM, COLON-MOTHER, DUODENAL POLYPECTOMY, EPIGASTRIC PAIN, PERSONAL HISTORY OF COLONIC POLYPS (SCREENING FOR COLONIC NEOPLASIA), LONG-TERM (CURRENT) USE OF NON-STEROIDAL ANTI-INFLAMMATORIES Postoperative diagnosis: Hiatal hernia, pyloric stenosis, duodenal thick fold :  Dr. Daniella Medrano Assistant(s): Endoscopy Technician-1: Brenden Mota Endoscopy RN-2: Dominga Potts, RN Specimens:  
ID Type Source Tests Collected by Time Destination 1 : Duodenal thick fold BX Preservative   Shaquille Shaw MD 2/28/2019 1104 Pathology 2 : Citlalli Dole Duodenum  Shaquille Shaw MD 2/28/2019 1108 Pathology 3 : Stomach BX Preservative Stomach  Shaquille Shaw MD 2/28/2019 1110 Pathology 4 : Mid esophagus BX Preservative Esophagus, Mid  Shaquille Shaw MD 2/28/2019 1111 Pathology H. Pylori  no Assessment: 
Intra-procedure medications Anesthesia gave intra-procedure sedation and medications, see anesthesia flow sheet Intravenous fluids: NS@ Laverne Lunch Vital signs stable Abdominal assessment: round and soft Recommendation: 
Discharge patient per MD order. Family or Friend Permission to share finding with family or friend yes

## 2019-02-28 NOTE — ANESTHESIA POSTPROCEDURE EVALUATION
Procedure(s): ESOPHAGOGASTRODUODENOSCOPY (EGD) ESOPHAGOGASTRODUODENAL (EGD) BIOPSY 
ESOPHAGEAL DILATION. Anesthesia Post Evaluation Patient location during evaluation: PACU Note status: Adequate. Level of consciousness: responsive to verbal stimuli and sleepy but conscious Pain management: satisfactory to patient Airway patency: patent Anesthetic complications: no 
Cardiovascular status: acceptable Respiratory status: acceptable Hydration status: acceptable Comments: +Post-Anesthesia Evaluation and Assessment Patient: Nurys Whiting MRN: [id number]  SSN: [id number] YOB: 1940  Age: 70 y.o. Sex: female Cardiovascular Function/Vital Signs /64   Pulse 60   Temp 36.7 °C (98 °F)   Resp 16   Ht 5' 4\" (1.626 m)   Wt 52.7 kg (116 lb 4 oz)   SpO2 100%   Breastfeeding? No   BMI 19.95 kg/m² Patient is status post Procedure(s): ESOPHAGOGASTRODUODENOSCOPY (EGD) ESOPHAGOGASTRODUODENAL (EGD) BIOPSY 
ESOPHAGEAL DILATION. Nausea/Vomiting: Controlled. Postoperative hydration reviewed and adequate. Pain: 
Pain Scale 1: Visual (02/28/19 1121) Pain Intensity 1: 0 (02/28/19 1121) Managed. Neurological Status: At baseline. Mental Status and Level of Consciousness: Arousable. Pulmonary Status:  
O2 Device: Room air (02/28/19 1121) Adequate oxygenation and airway patent. Complications related to anesthesia: None Post-anesthesia assessment completed. No concerns. Signed By: Adelaida Nash MD  
 2/28/2019 Post anesthesia nausea and vomiting:  controlled Visit Vitals /64 Pulse 60 Temp 36.7 °C (98 °F) Resp 16 Ht 5' 4\" (1.626 m) Wt 52.7 kg (116 lb 4 oz) SpO2 100% Breastfeeding? No  
BMI 19.95 kg/m²

## 2019-02-28 NOTE — PERIOP NOTES
Anesthesia reports 160mg Propofol, 80mg Lidocaine and 350mL NS given during procedure. Received report from anesthesia staff on vital signs and status of patient.

## 2019-02-28 NOTE — DISCHARGE INSTRUCTIONS
Kerry Perez  [id number]  1940              Procedure  Discharge Instructions:      Discomfort:  Redness at IV site- apply warm compress to area; if redness or soreness persist- contact your physician  There may be a slight amount of blood passed from the rectum  Gaseous discomfort- walking, belching will help relieve any discomfort  You may not operate a vehicle for 12 hours  You may not engage in an occupation involving machinery or appliances for rest of today  You may not drink alcoholic beverages for at least 12 hours  Avoid making any critical decisions for at least 24 hour  DIET:   You may resume your normal diet today. You should not overeat or \"feast\" today as your abdomen may become distended or uncomfortable. MEDICATIONS:   I reconciled this list from the list you gave us when you came today for the procedure. Please clarify with me, your primary care physician and the nurse who is discharging you if we have any discrepancies. Aspirin and or non-steroidal medication (Ibuprofen, Motrin, naproxen, etc.) is ok in limited quantities. ACTIVITY:  You may resume your normal daily activities it is recommended that you spend the remainder of the day resting -  avoid any strenuous activity. CALL M.D. ANY SIGN OF:  Increasing pain, nausea, vomiting  Abdominal distension (swelling)  New increased bleeding (oral or rectal)  Fever (chills)  Pain in chest area  Bloody discharge from nose or mouth  Shortness of breath          Follow-up Instructions:   Call Dr. Abby Sam for the results of  biopsy in approximately one week  I took biopsies where the polyp was removed. I dilated your outlet from the stomach to the small intestine. Telephone #  [phone]  Follow up visit as previously scheduled.       Porfirio Salas MD  11:21 AM  2/28/2019

## 2019-02-28 NOTE — PERIOP NOTES
CRE balloon dilatation of the esophagus 12 mm Balloon inflated to 3 ATMs and held for 60 seconds. 13.5 mm Balloon inflated to 4.5 ATMs and held for 60 seconds. 15 mm Balloon inflated to 8 ATMs and held for 60 seconds. No subcutaneous crepitus of the chest or cervical region was noted post dilatation.

## 2019-02-28 NOTE — H&P
Pre-endoscopy H and P The patient was seen and examined in the endoscopy suite. The airway was assessed and docuemented. The problem list, past medical history, and medications were reviewed. The history is:  Pt is here secondary to diarrhea she's been having diarrhea that started Wed 1/9/19, after getting a steroid injection, BM progressed from loose to watery with mucous, 4 BM's early this AM that woke her from bed at 2:30am, no fevers or blood, +cramping every now and then, no close contacts with diarrhea, no suspect food, no recent abx or travel. On 1/11 she woke up at 4:30 and had 4 loose BM and then nothing further. We are doing this for follo wup of duodneal polyp Pt reports she tried Pepto bismol with relief. Reports Sat-Tuesday she resumed eating, passed gas. Then took one stool softener on Monday, on Tuesday her BM was soft and darker in color. Then Wednesday it was back to watery appearing stools. She then took Imodium and her diarrhea stopped. Followed BRAT diet Wednesday into Thursday and now going back to her normal diet. She has chills however she attributes this to weight loss, no fever. Denies any hematochezia. She has some mild abdominal cramping and some at night that may have woke her up, some nocturnal diarrhea. Denies any recent travel, or any cruise ship travel. Denies any ABX use. Has stopped NSAIDs since December, she is on well water. Has been on Omeprazole for years. Not on a GF diet at this time. Denies any constipation prior to onset. EGD on 10/31/18. Based on procedure report it appeared area was healing however pathology report revealed that there was artifact visualized related to cautery however no adenoma tissue visualized. EGD 9/20/18: There was an 8mm sessile polyp in the second portion of the duodenum. I took 
biopsies of the duodenum (including the polyp), stomach, and the mid-esophagus. Remainder of exam normal.  
 
Colonoscopy 9/20/18: 6 mm sessile sigmoid colon polyp upon removal of the scope, this polyp could not be seen. No other abnormalities were noted. Retroflexion in the rectum was not performed due to small rectal vault. Repeat colonoscopy in 3 years. 1. Duodenal polyp, biopsy: 
Tubulovillous adenoma (one fragment). Increased intraepithelial lymphocytes without blunting of villi, see 
comment. 2. Gastric, biopsy: 
Benign gastric oxyntic and antral type mucosa, no pathologic 
diagnosis. 3. Mid esophagus, biopsy: 
No pathologic diagnosis; intramucosal eosinophils are not increased. Comment The duodenal biopsy, specimen #1, shows increased intraepithelial 
lymphocytes without blunting of villi. This is a relatively nonspecific 
finding but can be associated with celiac sprue. Correlation with 
clinical features and serologic studies may be helpful. Patient Active Problem List  
Diagnosis Code  Family history of colon cancer requiring screening colonoscopy Z80.0  Dyspepsia R10.13  
 Diarrhea R19.7 Social History Socioeconomic History  Marital status:  Spouse name: Not on file  Number of children: Not on file  Years of education: Not on file  Highest education level: Not on file Social Needs  Financial resource strain: Not on file  Food insecurity - worry: Not on file  Food insecurity - inability: Not on file  Transportation needs - medical: Not on file  Transportation needs - non-medical: Not on file Occupational History  Not on file Tobacco Use  Smoking status: Never Smoker  Smokeless tobacco: Never Used Substance and Sexual Activity  Alcohol use: Yes Comment: occasional wine  Drug use: No  
 Sexual activity: Not on file Other Topics Concern  Not on file Social History Narrative  Not on file Past Medical History:  
Diagnosis Date  Arthritis  Chronic pain   
 arthritis/chronic headache daily  Diarrhea 2/28/2019  Family history of colon cancer requiring screening colonoscopy 1/29/2013  GERD (gastroesophageal reflux disease)  Hypertension  PUD (peptic ulcer disease) The patient has a family history of na Cannot display prior to admission medications because the patient has not been admitted in this contact. Medications Prior to Admission Medication Sig  
 docosahexanoic acid/epa (FISH OIL PO) Take 1 Cap by mouth daily.  pantoprazole (PROTONIX) 40 mg tablet Take 40 mg by mouth daily.  BISOPROLOL FUMARATE PO Take 2.5 mg by mouth daily.  LORazepam (ATIVAN) 0.5 mg tablet Take 0.5 mg by mouth nightly.  multivitamins-minerals-lutein (CENTRUM SILVER) Tab Take 1 Tab by mouth daily.  CALCIUM CARBONATE/VITAMIN D3 (CALCIUM + D PO) Take 1 Tab by mouth daily.  aspirin-acetaminophen-caffeine (EXCEDRIN ES) 250-250-65 mg per tablet Take 1 Tab by mouth. The review of systems is:  negative for shortness of breath or chest pain The heart, lungs, and mental status were satisfactory for the administration of anesthesia sedation and for the procedure. I discussed with the patient the objectives, risks, consequences and alternatives to the procedure.    
 
Ramiro Roman MD 
2/28/2019 
4:59 AM

## 2019-02-28 NOTE — PROCEDURES
Esophagogastroduodenoscopy    Indications:  History duodenal polyp  Dyspepsia  diarrhea    Medications:  See anesthesia form    Post procedure diagnosis:  Hiatal hernia, pyloric stenosis, duodenal thick fold    Description of Procedure:    Prior to the procedure its objectives, risks, consequences and alternatives were discussed with the patient who then elected to proceed. The Olympus video endoscope was inserted under direct vision into the mouth and then into the esophagus. The esophagus looked normal.    The z-line was located at 40cm. Intermittently there was a small, 1 cm hiatal hernia. There were no diagnostic abnormalities of the body, fundus, antrum, cardia and incisura of the stomach. This included direct and retroflexion examination. The pylorus intermitittnely felt tight, even though it allowed the 10mm scope to cross. The first portion of the duodenum appeared normal.  In the second portion at 6:00 there were two small scars and an elevated/ thick fold between them. The mucosa appeared normal by narrow band imaging. The second portion of the duodenum otherwise appeared normal.  I took biopsies of the thick fold. I took duodenal biopsies. I took gastric and mid esophageal biopsies  I passed a TTS pyloric balloon dilator through the suction channel of the endoscope while the endoscope was in the lumen of the duodenum. I gradually pulled back on the endoscope as the dilator entered the lumen of the duodenum. I could see that the balloon spanned the pylorus. I dilated the balloon from 12mm to 13.5mm to 15 each for sixty seconds. After the procedure I inspected the pylorus and saw no complications. I also saw that the lumen was more widely patent. Complications: There were no apparent complications and the patient tolerated the procedure well.         Estimated Blood Loss:  none  Specimens Removed:  Duodenal thick fold  Duodenum  Stomach  Mid esophagus    Impressions:  Successful dilation of pylorus  No duodenal polyp seen, but area of scarring and thick fold present--s/p biopsies.   Recall one year egd if negative for polyp  Small hiatal hernia      Signed By: Panchito Najera MD                        February 28, 2019     11:16 AM

## 2019-02-28 NOTE — ANESTHESIA PREPROCEDURE EVALUATION
Anesthetic History No history of anesthetic complications Review of Systems / Medical History Patient summary reviewed, nursing notes reviewed and pertinent labs reviewed Pulmonary Within defined limits Neuro/Psych Within defined limits Cardiovascular Within defined limits Hypertension Exercise tolerance: >4 METS 
  
GI/Hepatic/Renal 
Within defined limits GERD 
 
 
PUD Endo/Other Within defined limits Arthritis Other Findings Physical Exam 
 
Airway Mallampati: II 
TM Distance: > 6 cm Neck ROM: normal range of motion Mouth opening: Normal 
 
 Cardiovascular Regular rate and rhythm,  S1 and S2 normal,  no murmur, click, rub, or gallop Dental 
No notable dental hx Pulmonary Breath sounds clear to auscultation Abdominal 
GI exam deferred Other Findings Anesthetic Plan ASA: 2 Anesthesia type: total IV anesthesia Induction: Intravenous Anesthetic plan and risks discussed with: Patient

## 2020-05-26 NOTE — PERIOP NOTES
Pt is going to go to either [facility] or [facility] tomorrow morning for Covid testing.

## 2020-05-28 ENCOUNTER — ANESTHESIA (OUTPATIENT)
Dept: ENDOSCOPY | Age: 80
End: 2020-05-28
Payer: MEDICARE

## 2020-05-28 ENCOUNTER — ANESTHESIA EVENT (OUTPATIENT)
Dept: ENDOSCOPY | Age: 80
End: 2020-05-28
Payer: MEDICARE

## 2020-05-28 ENCOUNTER — HOSPITAL ENCOUNTER (OUTPATIENT)
Age: 80
Setting detail: OUTPATIENT SURGERY
Discharge: HOME | End: 2020-05-28
Attending: SPECIALIST | Admitting: SPECIALIST
Payer: MEDICARE

## 2020-05-28 VITALS
HEART RATE: 58 BPM | OXYGEN SATURATION: 100 % | DIASTOLIC BLOOD PRESSURE: 72 MMHG | BODY MASS INDEX: 19.99 KG/M2 | WEIGHT: 117.06 LBS | RESPIRATION RATE: 16 BRPM | HEIGHT: 64 IN | SYSTOLIC BLOOD PRESSURE: 175 MMHG | TEMPERATURE: 98.8 F

## 2020-05-28 PROBLEM — R89.7 ABNORMAL HISTOLOGY FINDINGS: Status: ACTIVE | Noted: 2020-05-28

## 2020-05-28 PROBLEM — R10.33 PERIUMBILICAL PAIN: Status: ACTIVE | Noted: 2020-05-28

## 2020-05-28 LAB
H PYLORI FROM TISSUE: NEGATIVE
KIT LOT NO., HCLOLOT: NORMAL
NEGATIVE CONTROL: NEGATIVE
POSITIVE CONTROL: POSITIVE

## 2020-05-28 PROCEDURE — 76060000031 HC ANESTHESIA FIRST 0.5 HR: Performed by: SPECIALIST

## 2020-05-28 PROCEDURE — 74011250636 HC RX REV CODE- 250/636: Performed by: SPECIALIST

## 2020-05-28 PROCEDURE — 77030019988 HC FCPS ENDOSC DISP BSC -B: Performed by: SPECIALIST

## 2020-05-28 PROCEDURE — 74011250636 HC RX REV CODE- 250/636: Performed by: ANESTHESIOLOGY

## 2020-05-28 PROCEDURE — 87077 CULTURE AEROBIC IDENTIFY: CPT | Performed by: SPECIALIST

## 2020-05-28 PROCEDURE — 74011000250 HC RX REV CODE- 250: Performed by: ANESTHESIOLOGY

## 2020-05-28 PROCEDURE — 88305 TISSUE EXAM BY PATHOLOGIST: CPT

## 2020-05-28 PROCEDURE — 76040000019: Performed by: SPECIALIST

## 2020-05-28 RX ORDER — SODIUM CHLORIDE 9 MG/ML
50 INJECTION, SOLUTION INTRAVENOUS CONTINUOUS
Status: DISCONTINUED | OUTPATIENT
Start: 2020-05-28 | End: 2020-05-28 | Stop reason: HOSPADM

## 2020-05-28 RX ORDER — ATROPINE SULFATE 0.1 MG/ML
0.5 INJECTION INTRAVENOUS
Status: DISCONTINUED | OUTPATIENT
Start: 2020-05-28 | End: 2020-05-28 | Stop reason: HOSPADM

## 2020-05-28 RX ORDER — DEXTROMETHORPHAN/PSEUDOEPHED 2.5-7.5/.8
1.2 DROPS ORAL
Status: DISCONTINUED | OUTPATIENT
Start: 2020-05-28 | End: 2020-05-28 | Stop reason: HOSPADM

## 2020-05-28 RX ORDER — NALOXONE HYDROCHLORIDE 0.4 MG/ML
0.4 INJECTION, SOLUTION INTRAMUSCULAR; INTRAVENOUS; SUBCUTANEOUS
Status: DISCONTINUED | OUTPATIENT
Start: 2020-05-28 | End: 2020-05-28 | Stop reason: HOSPADM

## 2020-05-28 RX ORDER — FAMOTIDINE 40 MG/1
40 TABLET, FILM COATED ORAL 2 TIMES DAILY
COMMUNITY
End: 2021-03-04

## 2020-05-28 RX ORDER — LIDOCAINE HYDROCHLORIDE 20 MG/ML
INJECTION, SOLUTION EPIDURAL; INFILTRATION; INTRACAUDAL; PERINEURAL AS NEEDED
Status: DISCONTINUED | OUTPATIENT
Start: 2020-05-28 | End: 2020-05-28 | Stop reason: HOSPADM

## 2020-05-28 RX ORDER — PROPOFOL 10 MG/ML
INJECTION, EMULSION INTRAVENOUS AS NEEDED
Status: DISCONTINUED | OUTPATIENT
Start: 2020-05-28 | End: 2020-05-28 | Stop reason: HOSPADM

## 2020-05-28 RX ORDER — SODIUM CHLORIDE 0.9 % (FLUSH) 0.9 %
5-40 SYRINGE (ML) INJECTION EVERY 8 HOURS
Status: DISCONTINUED | OUTPATIENT
Start: 2020-05-28 | End: 2020-05-28 | Stop reason: HOSPADM

## 2020-05-28 RX ORDER — EPINEPHRINE 0.1 MG/ML
1 INJECTION INTRACARDIAC; INTRAVENOUS
Status: DISCONTINUED | OUTPATIENT
Start: 2020-05-28 | End: 2020-05-28 | Stop reason: HOSPADM

## 2020-05-28 RX ORDER — FLUMAZENIL 0.1 MG/ML
0.2 INJECTION INTRAVENOUS
Status: DISCONTINUED | OUTPATIENT
Start: 2020-05-28 | End: 2020-05-28 | Stop reason: HOSPADM

## 2020-05-28 RX ORDER — SODIUM CHLORIDE 0.9 % (FLUSH) 0.9 %
5-40 SYRINGE (ML) INJECTION AS NEEDED
Status: DISCONTINUED | OUTPATIENT
Start: 2020-05-28 | End: 2020-05-28 | Stop reason: HOSPADM

## 2020-05-28 RX ORDER — ESOMEPRAZOLE MAGNESIUM 40 MG/1
40 CAPSULE, DELAYED RELEASE ORAL DAILY
Qty: 30 CAP | Refills: 5 | Status: SHIPPED | OUTPATIENT
Start: 2020-05-28 | End: 2020-10-20

## 2020-05-28 RX ADMIN — LIDOCAINE HYDROCHLORIDE 60 MG: 20 INJECTION, SOLUTION EPIDURAL; INFILTRATION; INTRACAUDAL; PERINEURAL at 10:29

## 2020-05-28 RX ADMIN — SODIUM CHLORIDE 50 ML/HR: 900 INJECTION, SOLUTION INTRAVENOUS at 10:07

## 2020-05-28 RX ADMIN — PROPOFOL 120 MG: 10 INJECTION, EMULSION INTRAVENOUS at 10:46

## 2020-05-28 NOTE — H&P
Pre-endoscopy H and P    The patient was seen and examined in the endoscopy suite. The airway was assessed and docuemented. The problem list, past medical history, and medications were reviewed. The history is:    No major complaints. Still has the same \"little issues. \" Nothing is serious or lasts more than several minutes. Gets strange \"knifing sharp stabs\" occasionally. Usually occur from middle of abd to the right side including flank and back. Also has arthritis and isn't sure if pains are GI in origin or not. Has another type of discomfort in the middle that comes at other times but isn't sharp. Comes at other times. Has a gurgling and noisy stomach. Taking famotidine 40mg BID. Has taken pantoprazole only a few times over the past 6 months for breakthrough sxs, ie, sharp abd pains. Does have a lot of bloating. BMs are like clockwork every AM. No issues with bowels. Wonders if stress could be causing her sxs. The thing that bothers her is that there was never a positive test for Celiac and she really wants to clear up the diagnosis. She does follow the diet but isn't sure if there is cross contamination causing her pains. EGD 2/28/19: The esophagus looked normal. The z-line was located at 40cm. Intermittently there was a small, 1 cm hiatal hernia. There were no diagnostic abnormalities of the body, fundus, antrum, cardia and incisura of the stomach. This included direct and retroflexion examination. The pylorus intermitittnely felt tight, even though it allowed the 10mm scope to cross. The first portion of the duodenum appeared normal. In the second portion at 6:00 there were two small scars and an elevated/ thick fold between them. The mucosa appeared normal by narrow band imaging. The second portion of the duodenum otherwise appeared normal. I took biopsies of the thick fold. I took duodenal biopsies.  I took gastric and mid esophageal biopsies I passed a TTS pyloric balloon dilator through the suction channel of the endoscope while the endoscope was in the lumen of the duodenum. I gradually pulled back on the endoscope as the dilator entered the lumen of the duodenum. I could see that the balloon spanned the pylorus. I dilated the balloon from 12mm to 13.5mm to 15 each for sixty seconds. After the procedure I inspected the pylorus and saw no complications. I also saw that the lumen was more widely patent. FINAL PATHOLOGIC DIAGNOSIS  1. Thickened duodenal fold, biopsy: Increased intraepithelial lymphocytes without blunting of villi, see comment. Negative for adenomatous epithelium. 2. Duodenum, biopsy: Increased intraepithelial lymphocytes without blunting of villi, seecomment. Negative for adenomatous epithelium. 3. Stomach, biopsy: Benign gastric oxyntic and antral type mucosa, no pathologic diagnosis. 4. Mid esophagus, biopsy: No pathologic diagnosis; intramucosal eosinophils are not increased. Comment The duodenal biopsies (specimen #1 and specimen #2) show a subtle increasein intraepithelial lymphocytes without blunting of villi. This is a relatively nonspecific finding but can be associated with celiac sprue. Correlation with clinical features and serologic studies may be helpful. ? She is \"fair to good. \" No major complaints. Still has the same \"little issues. \" Nothing is serious or lasts more than several minutes. Gets strange \"knifing sharp stabs\" occasionally. Usually occur from middle of abd to the right side including flank and back. Also has arthritis and isn't sure if pains are GI in origin or not. Has another type of discomfort in the middle that comes at other times but isn't sharp. Comes at other times. Has a gurgling and noisy stomach. Taking famotidine 40mg BID. Has taken pantoprazole only a few times over the past 6 months for breakthrough sxs, ie, sharp abd pains. Does have a lot of bloating. BMs are like clockwork every AM. No issues with bowels.  Wonders if stress could be causing her sxs.     The thing that bothers her is that there was never a positive test for Celiac and she really wants to clear up the diagnosis. She does follow the diet but isn't sure if there is cross contamination causing her pains. EGD 2/28/19: The esophagus looked normal. The z-line was located at 40cm. Intermittently there was a small, 1 cm hiatal hernia. There were no diagnostic abnormalities of the body, fundus, antrum, cardia and incisura of the stomach. This included direct and retroflexion examination. The pylorus intermitittnely felt tight, even though it allowed the 10mm scope to cross. The first portion of the duodenum appeared normal. In the second portion at 6:00 there were two small scars and an elevated/ thick fold between them. The mucosa appeared normal by narrow band imaging. The second portion of the duodenum otherwise appeared normal. I took biopsies of the thick fold. I took duodenal biopsies. I took gastric and mid esophageal biopsies I passed a TTS pyloric balloon dilator through the suction channel of the endoscope while the endoscope was in the lumen of the duodenum. I gradually pulled back on the endoscope as the dilator entered the lumen of the duodenum. I could see that the balloon spanned the pylorus. I dilated the balloon from 12mm to 13.5mm to 15 each for sixty seconds. After the procedure I inspected the pylorus and saw no complications. I also saw that the lumen was more widely patent. FINAL PATHOLOGIC DIAGNOSIS  1. Thickened duodenal fold, biopsy: Increased intraepithelial lymphocytes without blunting of villi, see comment. Negative for adenomatous epithelium. 2. Duodenum, biopsy: Increased intraepithelial lymphocytes without blunting of villi, seecomment. Negative for adenomatous epithelium. 3. Stomach, biopsy: Benign gastric oxyntic and antral type mucosa, no pathologic diagnosis.   4. Mid esophagus, biopsy: No pathologic diagnosis; intramucosal eosinophils are not increased. Comment The duodenal biopsies (specimen #1 and specimen #2) show a subtle increasein intraepithelial lymphocytes without blunting of villi. This is a relatively nonspecific finding but can be associated with celiac sprue. Correlation with clinical features and serologic studies may be helpful.  ?      Past Medical History         Patient Active Problem List   Diagnosis Code    Family history of colon cancer requiring screening colonoscopy Z80.0    Dyspepsia R10.13    Diarrhea Z13.5    Periumbilical pain S54.31    Abnormal histology findings R89.7     Social History     Socioeconomic History    Marital status:      Spouse name: Not on file    Number of children: Not on file    Years of education: Not on file    Highest education level: Not on file   Occupational History    Not on file   Social Needs    Financial resource strain: Not on file    Food insecurity     Worry: Not on file     Inability: Not on file    Transportation needs     Medical: Not on file     Non-medical: Not on file   Tobacco Use    Smoking status: Never Smoker    Smokeless tobacco: Never Used   Substance and Sexual Activity    Alcohol use: Yes     Comment: occasional wine    Drug use: No    Sexual activity: Not on file   Lifestyle    Physical activity     Days per week: Not on file     Minutes per session: Not on file    Stress: Not on file   Relationships    Social connections     Talks on phone: Not on file     Gets together: Not on file     Attends Sabianism service: Not on file     Active member of club or organization: Not on file     Attends meetings of clubs or organizations: Not on file     Relationship status: Not on file    Intimate partner violence     Fear of current or ex partner: Not on file     Emotionally abused: Not on file     Physically abused: Not on file     Forced sexual activity: Not on file   Other Topics Concern    Not on file   Social History Narrative    Not on file     Past Medical History:   Diagnosis Date    Abnormal histology findings 5/28/2020    ? Celiac disease 2019    Arthritis     Chronic pain     arthritis/chronic headache daily    Diarrhea 2/28/2019    Family history of colon cancer requiring screening colonoscopy 1/29/2013    GERD (gastroesophageal reflux disease)     Hypertension     Periumbilical pain 9/62/1125    PUD (peptic ulcer disease)      The patient has a family history of na    Medications Prior to Admission   Medication Sig    vitamin Q-S-Z-lutein-minerals (OCUVITE) tablet daily.  famotidine (PEPCID) 40 mg tablet Take 40 mg by mouth daily.  BISOPROLOL FUMARATE PO Take 2.5 mg by mouth daily.  aspirin-acetaminophen-caffeine (EXCEDRIN ES) 250-250-65 mg per tablet Take 1 Tab by mouth.  docosahexanoic acid/epa (FISH OIL PO) Take 1 Cap by mouth daily.  pantoprazole (PROTONIX) 40 mg tablet Take 40 mg by mouth daily.  LORazepam (ATIVAN) 0.5 mg tablet Take 0.5 mg by mouth nightly.  multivitamins-minerals-lutein (CENTRUM SILVER) Tab Take 1 Tab by mouth daily.  CALCIUM CARBONATE/VITAMIN D3 (CALCIUM + D PO) Take 1 Tab by mouth daily. The review of systems is:  negative for shortness of breath or chest pain      The heart, lungs, and mental status were satisfactory for the administration of anesthesia sedation and for the procedure. The patient was counseled at length about the risks of nain Covid-19 during their perioperative period and any recovery window from their procedure. The patient was made aware that nain Covid-19  may worsen their prognosis for recovering from their procedure and lend to a higher morbidity and/or mortality risk. All material risks, benefits, and reasonable alternatives including postponing the procedure were discussed. The patient does  wish to proceed with the procedure at this time. I discussed with the patient the objectives, risks, consequences and alternatives to the procedure. Danika Reyes MD  5/28/2020  7:19 AM

## 2020-05-28 NOTE — ANESTHESIA POSTPROCEDURE EVALUATION
Procedure(s):  ESOPHAGOGASTRODUODENOSCOPY (EGD)  ESOPHAGOGASTRODUODENAL (EGD) BIOPSY. total IV anesthesia    Anesthesia Post Evaluation        Patient location during evaluation: PACU  Note status: Adequate. Level of consciousness: responsive to verbal stimuli and sleepy but conscious  Pain management: satisfactory to patient  Airway patency: patent  Anesthetic complications: no  Cardiovascular status: acceptable  Respiratory status: acceptable  Hydration status: acceptable  Comments: +Post-Anesthesia Evaluation and Assessment    Patient: Soni Walters MRN: [id number]  SSN: [id number]   YOB: 1940  Age: 68 y.o. Sex: female      Cardiovascular Function/Vital Signs    /72   Pulse (!) 58   Temp 37.1 °C (98.8 °F)   Resp 16   Ht 5' 4\" (1.626 m)   Wt 53.1 kg (117 lb 1 oz)   SpO2 100%   Breastfeeding No   BMI 20.09 kg/m²     Patient is status post Procedure(s):  ESOPHAGOGASTRODUODENOSCOPY (EGD)  ESOPHAGOGASTRODUODENAL (EGD) BIOPSY. Nausea/Vomiting: Controlled. Postoperative hydration reviewed and adequate. Pain:  Pain Scale 1: Numeric (0 - 10) (05/28/20 1126)  Pain Intensity 1: 0 (05/28/20 1115)   Managed. Neurological Status: At baseline. Mental Status and Level of Consciousness: Arousable. Pulmonary Status:   O2 Device: Room air (05/28/20 1126)   Adequate oxygenation and airway patent. Complications related to anesthesia: None    Post-anesthesia assessment completed. No concerns.     Signed By: Will Dela Cruz DO    5/28/2020  Post anesthesia nausea and vomiting:  controlled      INITIAL Post-op Vital signs:   Vitals Value Taken Time   /72 5/28/2020 11:26 AM   Temp 37.1 °C (98.8 °F) 5/28/2020 11:02 AM   Pulse 58 5/28/2020 11:26 AM   Resp 16 5/28/2020 11:26 AM   SpO2 100 % 5/28/2020 11:26 AM

## 2020-05-28 NOTE — ANESTHESIA PREPROCEDURE EVALUATION
Anesthetic History   No history of anesthetic complications            Review of Systems / Medical History  Patient summary reviewed, nursing notes reviewed and pertinent labs reviewed    Pulmonary  Within defined limits                 Neuro/Psych   Within defined limits           Cardiovascular  Within defined limits  Hypertension              Exercise tolerance: >4 METS     GI/Hepatic/Renal  Within defined limits   GERD      PUD     Endo/Other  Within defined limits      Arthritis     Other Findings              Physical Exam    Airway  Mallampati: II  TM Distance: 4 - 6 cm  Neck ROM: normal range of motion   Mouth opening: Normal     Cardiovascular  Regular rate and rhythm,  S1 and S2 normal,  no murmur, click, rub, or gallop             Dental  No notable dental hx       Pulmonary  Breath sounds clear to auscultation               Abdominal  GI exam deferred       Other Findings            Anesthetic Plan    ASA: 2  Anesthesia type: total IV anesthesia and MAC          Induction: Intravenous  Anesthetic plan and risks discussed with: Patient

## 2020-05-28 NOTE — PROCEDURES
Esophagogastroduodenoscopy    Indications:  Periumbilical pain  History of ?sprue for re biopsy    Medications:  See anesthesia form    Assistants:  Enio August RN  [facility]      Post procedure diagnosis:  gastric ulcers, hiatal hernia    Description of Procedure:    Prior to the procedure its objectives, risks, consequences and alternatives were discussed with the patient who then elected to proceed. The Olympus video endoscope was inserted under direct vision into the mouth and then into the esophagus. The esophagus looked normal.    The z-line was located at 39 cm. A small hiatal hernia was present. There were no diagnostic abnormalities of the body, fundus, cardia and incisura of the stomach. In the mid antrum at 3:00 there were two ulcers. One had a 15mm central ulceration and surrounding thick folds. This was firm in one area to biopsy. The second was 12mm, distal to the first and the folds around this were more prominent. Gastric exam included direct and retroflexion examination. The first and second portion of the duodenum appeared normal.  I took stomach biopsies for hp. I took duodenal biopsies. I took stomach and stomach ulcer biopsies. Complications: There were no apparent complications and the patient tolerated the procedure well.         Implants:  none    Estimated Blood Loss:  none  Specimens Removed:  Stomach hp rapid  Duodenum  Stomach and stomach ulcers  Impressions:  Gastric ulcers  Hiatal hernia      Signed By: Salima Chavis MD                        May 28, 2020     10:54 AM

## 2020-05-28 NOTE — PERIOP NOTES
Yanet Rivera  1940  [id number]    Situation:  Verbal report received from: Kika    Procedure: Procedure(s):  ESOPHAGOGASTRODUODENOSCOPY (EGD)  ESOPHAGOGASTRODUODENAL (EGD) BIOPSY    Background:    Preoperative diagnosis: ABNORMAL HISTOLOGICAL FINDINGS IN SPECIMENS FROM DIGESTIVE ORGANS AND ABDOMINAL CAVITY? CELIAC ON BIOPSY  Postoperative diagnosis: gastric ulcers, hiatal hernia    :  Dr. Dorcas Dejesus  Assistant(s): Endoscopy Technician-1: Fahad Sheldon  Endoscopy RN-1: Kelsey Escobar RN    Specimens:   ID Type Source Tests Collected by Time Destination   1 : duodenum bx Preservative Duodenum  Osvaldo Castillo MD 5/28/2020 1037 Pathology   2 : gastric bx Preservative Gastric  Osvaldo Castillo MD 5/28/2020 1040 Pathology     H. Pylori  yes    Assessment:  Intra-procedure medications     Anesthesia gave intra-procedure sedation and medications, see anesthesia flow sheet yes    Intravenous fluids: NS@ KVO     Vital signs stable     Abdominal assessment: round and soft     Recommendation:  Discharge patient per MD order.     Family or Friend Enrique  Permission to share finding with family or friend yes

## 2020-05-28 NOTE — PROGRESS NOTES
Endoscope was pre-cleaned at bedside immediately following procedure by Agusto WRIGHT Anesthesia reports 120mg Propofol, 60mg Lidocaine and 450mL NS given during procedure. Received report from anesthesia staff on vital signs and status of patient.

## 2020-05-28 NOTE — DISCHARGE INSTRUCTIONS
Renee Scanlon  [id number]  1940              Procedure  Discharge Instructions:      Discomfort:  Redness at IV site- apply warm compress to area; if redness or soreness persist- contact your physician  There may be a slight amount of blood passed from the rectum  Gaseous discomfort- walking, belching will help relieve any discomfort  You may not operate a vehicle for 12 hours  You may not engage in an occupation involving machinery or appliances for rest of today  You may not drink alcoholic beverages for at least 12 hours  Avoid making any critical decisions for at least 24 hour  DIET:   You may resume your normal diet today. You should not overeat or \"feast\" today as your abdomen may become distended or uncomfortable. MEDICATIONS:   I reconciled this list from the list you gave us when you came today for the procedure. Please clarify with me, your primary care physician and the nurse who is discharging you if we have any discrepancies. Aspirin and or non-steroidal medication (Ibuprofen, Motrin, naproxen, etc.) is ok in limited quantities. ACTIVITY:  You may resume your normal daily activities it is recommended that you spend the remainder of the day resting -  avoid any strenuous activity. CALL M.D. ANY SIGN OF:  Increasing pain, nausea, vomiting  Abdominal distension (swelling)  New increased bleeding (oral or rectal)  Fever (chills)  Pain in chest area  Bloody discharge from nose or mouth  Shortness of breath          Follow-up Instructions:   Call Dr. Margaret Alvarez for the results of  biopsy in approximately one week  Telephone #  [phone]  Follow up visit as previously scheduled. No aspirin, excedrin, ibuprofen, bc powders or the like.    I spoke to Enrique at [phone]  Repeat egd 2 month  Deborah Irvin MD  10:48 AM  5/28/2020

## 2020-07-19 ENCOUNTER — HOSPITAL ENCOUNTER (OUTPATIENT)
Dept: PREADMISSION TESTING | Age: 80
Discharge: HOME | End: 2020-07-19
Payer: MEDICARE

## 2020-07-19 PROCEDURE — 87635 SARS-COV-2 COVID-19 AMP PRB: CPT

## 2020-07-20 LAB
SARS-COV-2, COV2NT: NOT DETECTED
SOURCE, COVRS: NORMAL
SPECIMEN SOURCE, FCOV2M: NORMAL

## 2020-07-22 NOTE — PROGRESS NOTES
Verified with Winsome Hannah at Dr Lauren Vazquez office that pt is getting EGD and colonoscopy. Told office that EGD only one in [facility], heeded to call scheduling to add colonoscopy.

## 2020-07-23 ENCOUNTER — HOSPITAL ENCOUNTER (OUTPATIENT)
Age: 80
Setting detail: OUTPATIENT SURGERY
Discharge: HOME | End: 2020-07-23
Attending: SPECIALIST | Admitting: SPECIALIST
Payer: MEDICARE

## 2020-07-23 ENCOUNTER — ANESTHESIA EVENT (OUTPATIENT)
Dept: ENDOSCOPY | Age: 80
End: 2020-07-23
Payer: MEDICARE

## 2020-07-23 ENCOUNTER — ANESTHESIA (OUTPATIENT)
Dept: ENDOSCOPY | Age: 80
End: 2020-07-23
Payer: MEDICARE

## 2020-07-23 VITALS
BODY MASS INDEX: 19.52 KG/M2 | RESPIRATION RATE: 11 BRPM | OXYGEN SATURATION: 100 % | TEMPERATURE: 97.6 F | DIASTOLIC BLOOD PRESSURE: 69 MMHG | WEIGHT: 114.31 LBS | HEIGHT: 64 IN | SYSTOLIC BLOOD PRESSURE: 158 MMHG | HEART RATE: 57 BPM

## 2020-07-23 PROBLEM — Z87.11 HISTORY OF GASTRIC ULCER: Status: ACTIVE | Noted: 2020-07-23

## 2020-07-23 PROCEDURE — 74011000250 HC RX REV CODE- 250: Performed by: NURSE ANESTHETIST, CERTIFIED REGISTERED

## 2020-07-23 PROCEDURE — 76040000007: Performed by: SPECIALIST

## 2020-07-23 PROCEDURE — 77030019988 HC FCPS ENDOSC DISP BSC -B: Performed by: SPECIALIST

## 2020-07-23 PROCEDURE — 76060000032 HC ANESTHESIA 0.5 TO 1 HR: Performed by: SPECIALIST

## 2020-07-23 PROCEDURE — 88305 TISSUE EXAM BY PATHOLOGIST: CPT

## 2020-07-23 PROCEDURE — 74011250636 HC RX REV CODE- 250/636: Performed by: NURSE ANESTHETIST, CERTIFIED REGISTERED

## 2020-07-23 RX ORDER — SODIUM CHLORIDE 0.9 % (FLUSH) 0.9 %
5-40 SYRINGE (ML) INJECTION EVERY 8 HOURS
Status: DISCONTINUED | OUTPATIENT
Start: 2020-07-23 | End: 2020-07-23 | Stop reason: HOSPADM

## 2020-07-23 RX ORDER — DEXTROMETHORPHAN/PSEUDOEPHED 2.5-7.5/.8
DROPS ORAL
Status: DISCONTINUED
Start: 2020-07-23 | End: 2020-07-23 | Stop reason: HOSPADM

## 2020-07-23 RX ORDER — ATROPINE SULFATE 0.1 MG/ML
0.5 INJECTION INTRAVENOUS
Status: DISCONTINUED | OUTPATIENT
Start: 2020-07-23 | End: 2020-07-23 | Stop reason: HOSPADM

## 2020-07-23 RX ORDER — FLUMAZENIL 0.1 MG/ML
0.2 INJECTION INTRAVENOUS
Status: DISCONTINUED | OUTPATIENT
Start: 2020-07-23 | End: 2020-07-23 | Stop reason: HOSPADM

## 2020-07-23 RX ORDER — DEXTROMETHORPHAN/PSEUDOEPHED 2.5-7.5/.8
1.2 DROPS ORAL
Status: DISCONTINUED | OUTPATIENT
Start: 2020-07-23 | End: 2020-07-23 | Stop reason: HOSPADM

## 2020-07-23 RX ORDER — PROPOFOL 10 MG/ML
INJECTION, EMULSION INTRAVENOUS AS NEEDED
Status: DISCONTINUED | OUTPATIENT
Start: 2020-07-23 | End: 2020-07-23 | Stop reason: HOSPADM

## 2020-07-23 RX ORDER — SODIUM CHLORIDE 0.9 % (FLUSH) 0.9 %
5-40 SYRINGE (ML) INJECTION AS NEEDED
Status: DISCONTINUED | OUTPATIENT
Start: 2020-07-23 | End: 2020-07-23 | Stop reason: HOSPADM

## 2020-07-23 RX ORDER — EPINEPHRINE 0.1 MG/ML
1 INJECTION INTRACARDIAC; INTRAVENOUS
Status: DISCONTINUED | OUTPATIENT
Start: 2020-07-23 | End: 2020-07-23 | Stop reason: HOSPADM

## 2020-07-23 RX ORDER — SODIUM CHLORIDE 9 MG/ML
INJECTION, SOLUTION INTRAVENOUS
Status: DISCONTINUED | OUTPATIENT
Start: 2020-07-23 | End: 2020-07-23 | Stop reason: HOSPADM

## 2020-07-23 RX ORDER — LIDOCAINE HYDROCHLORIDE 20 MG/ML
INJECTION, SOLUTION EPIDURAL; INFILTRATION; INTRACAUDAL; PERINEURAL AS NEEDED
Status: DISCONTINUED | OUTPATIENT
Start: 2020-07-23 | End: 2020-07-23 | Stop reason: HOSPADM

## 2020-07-23 RX ORDER — SODIUM CHLORIDE 9 MG/ML
100 INJECTION, SOLUTION INTRAVENOUS CONTINUOUS
Status: DISCONTINUED | OUTPATIENT
Start: 2020-07-23 | End: 2020-07-23 | Stop reason: HOSPADM

## 2020-07-23 RX ORDER — NALOXONE HYDROCHLORIDE 0.4 MG/ML
0.4 INJECTION, SOLUTION INTRAMUSCULAR; INTRAVENOUS; SUBCUTANEOUS
Status: DISCONTINUED | OUTPATIENT
Start: 2020-07-23 | End: 2020-07-23 | Stop reason: HOSPADM

## 2020-07-23 RX ADMIN — PROPOFOL 50 MG: 10 INJECTION, EMULSION INTRAVENOUS at 10:54

## 2020-07-23 RX ADMIN — PROPOFOL 50 MG: 10 INJECTION, EMULSION INTRAVENOUS at 10:48

## 2020-07-23 RX ADMIN — PROPOFOL 50 MG: 10 INJECTION, EMULSION INTRAVENOUS at 10:45

## 2020-07-23 RX ADMIN — PROPOFOL 40 MG: 10 INJECTION, EMULSION INTRAVENOUS at 10:58

## 2020-07-23 RX ADMIN — LIDOCAINE HYDROCHLORIDE 20 MG: 20 INJECTION, SOLUTION EPIDURAL; INFILTRATION; INTRACAUDAL; PERINEURAL at 10:46

## 2020-07-23 RX ADMIN — PROPOFOL 50 MG: 10 INJECTION, EMULSION INTRAVENOUS at 10:51

## 2020-07-23 RX ADMIN — SODIUM CHLORIDE: 900 INJECTION, SOLUTION INTRAVENOUS at 10:43

## 2020-07-23 NOTE — ANESTHESIA PREPROCEDURE EVALUATION
Anesthetic History   No history of anesthetic complications            Review of Systems / Medical History  Patient summary reviewed, nursing notes reviewed and pertinent labs reviewed    Pulmonary  Within defined limits                 Neuro/Psych         Headaches     Cardiovascular  Within defined limits  Hypertension              Exercise tolerance: >4 METS     GI/Hepatic/Renal  Within defined limits   GERD      PUD     Endo/Other  Within defined limits      Arthritis     Other Findings              Physical Exam    Airway  Mallampati: II  TM Distance: 4 - 6 cm  Neck ROM: normal range of motion   Mouth opening: Normal     Cardiovascular  Regular rate and rhythm,  S1 and S2 normal,  no murmur, click, rub, or gallop             Dental  No notable dental hx       Pulmonary  Breath sounds clear to auscultation               Abdominal  GI exam deferred       Other Findings            Anesthetic Plan    ASA: 2  Anesthesia type: MAC          Induction: Intravenous  Anesthetic plan and risks discussed with: Patient

## 2020-07-23 NOTE — PROGRESS NOTES
Patient right eye is bloodshot and swollen. Patient has seen eye doctor and says that there is no pain.

## 2020-07-23 NOTE — PROCEDURES
Esophagogastroduodenoscopy    Indications:  History of peptic ulcer disease/ gastric ulcers  Change bowels  Prior biopsy of duodenum suggested celiac disease    Medications:  See anesthesia form    Assistants:  Sunil Carvalho RN      Post procedure diagnosis:  EGD: Gastric Ulcers  Colon: Negative colon, erythema ileum    Description of Procedure:    Prior to the procedure its objectives, risks, consequences and alternatives were discussed with the patient who then elected to proceed. The Olympus video endoscope was inserted under direct vision into the mouth and then into the esophagus. The esophagus looked normal.    The z-line was located at 40 cm. There were no diagnostic abnormalities of the body, fundus,and  cardia of the stomach. The incisura was normal.  Gastric exam  included direct and retroflexion examination. In the mid antrum there were two ulcers  12mm with surrounding erythema and 8mm. I took multiple biopsies. The biopsies of the larger ulcer were very firm. The first and second portion of the duodenum appeared normal.  I took duodenal biopsies. Complications: There were no apparent complications and the patient tolerated the procedure well.         Implants:  none    Estimated Blood Loss:  none  Specimens Removed:  Duodenum  Stomach and stomach ulcers  Impressions:  Gastric ulcers persist  Otherwise negative exam      Signed By: Emily Hendrickson MD                        July 23, 2020     11:22 AM

## 2020-07-23 NOTE — DISCHARGE INSTRUCTIONS
Yousif Vaughn  [id number]  1940              Procedure  Discharge Instructions:      Discomfort:  Redness at IV site- apply warm compress to area; if redness or soreness persist- contact your physician  There may be a slight amount of blood passed from the rectum  Gaseous discomfort- walking, belching will help relieve any discomfort  You may not operate a vehicle for 12 hours  You may not engage in an occupation involving machinery or appliances for rest of today  You may not drink alcoholic beverages for at least 12 hours  Avoid making any critical decisions for at least 24 hour  DIET:   You may resume your normal diet today. You should not overeat or \"feast\" today as your abdomen may become distended or uncomfortable. MEDICATIONS:   I reconciled this list from the list you gave us when you came today for the procedure. Please clarify with me, your primary care physician and the nurse who is discharging you if we have any discrepancies. {Medication reconciliation information is now added to the patient's AVS automatically when it is printed. There is no need to use this SmartLink in discharge instructions. Highlight this text and delete it to clear this message}     Aspirin and or non-steroidal medication (Ibuprofen, Motrin, naproxen, etc.) is ok in limited quantities. ACTIVITY:  You may resume your normal daily activities it is recommended that you spend the remainder of the day resting -  avoid any strenuous activity. CALL M.D. ANY SIGN OF:  Increasing pain, nausea, vomiting  Abdominal distension (swelling)  New increased bleeding (oral or rectal)  Fever (chills)  Pain in chest area  Bloody discharge from nose or mouth  Shortness of breath          Follow-up Instructions:   Call Dr. Aurora Mckenzie for the results of  biopsy in approximately one week  Telephone #  [phone]  Follow up visit as previously scheduled.     I spoke to [place]  [phone]    Please double the Nexium to 40 mg twice a day    I will order ct and see you in the office.     Rafael Matos MD  11:28 AM  7/23/2020

## 2020-07-23 NOTE — PROCEDURES
Natarajan Muck Colonoscopy    Indications: change bowel habits  Abnormal fecal calprotectin    Pre-operative Diagnosis: see above      Assistant(s):  see chart So Carvalho RN  Alexander Perez RN    Medications:  See anesthesia form    Post-operative Diagnosis:    Colon: Negative exam  Ileum:  erythema            Procedure Details   Prior to the procedure its objectives, risks, consequences and alternatives were discussed with the patient who then elected to proceed. All questions were answered. Digital Rectal Exam:  was normal     The Olympus videocolonoscope was inserted in the rectum and advanced to the cecum. The cecum was identified by typical landmarks. The terminal ileum was intubated and appeared normal except for two patches of erythema. I took biopsies. The colonoscope was slowly and carefully withdrawn as the mucosa was inspected. No abnormalities were noted. Retroflexion in the rectum was negative. I took random colon bipsies. Photos to document the ileocecal valve, appendiceal orifice and retroflexion exam were obtained. The preparation was adequate      Estimated Blood Loss:  none    Specimens:  Ileum  Random colon     Findings:  Erythema ileum  Negative colon    Complications:  none    Implants:  none    Repeat colonoscopy is recommended in:  (as clinically warranted).                Marialuisa Bueno MD  11:19 AM  7/23/2020

## 2020-07-23 NOTE — H&P
Pre-endoscopy H and P    The patient was seen and examined in the endoscopy suite. The airway was assessed and docuemented. The problem list, past medical history, and medications were reviewed. The history is: The esophagus looked normal. The z-line was located at 39 cm. Asmall hiatal hernia was present. There were no diagnostic abnormalities of thebody, fundus, cardia and incisura of the stomach. In the mid antrum at 3:00there were two ulcers. One had a 15mm central ulceration and surrounding thickfolds. This was firm in one area to biopsy. The second was 12mm, distal to thefirst and the folds around this were more prominent. Gastric exam includeddirect and retroflexion examination. The first and second portion of theduodenum appeared normal. I took stomach biopsies for hp. I took duodenalbiopsies. I took stomach and stomach ulcer biopsies. FINAL PATHOLOGIC DIAGNOSIS  1. Duodenum, biopsy:Mild chronic duodenitis; no blunting of villi or significant increasein intraepithelial lymphocytes. 2. Gastric, biopsy:Reactive gastropathy with erosions. H. pylori negative    Still has all of the same sxs. Gets sharp stabbing pains in mid section or towards the right side. Sharp pains go away almost instantly. Has general stomach discomfort and soreness. Is aware of it but it is not bad. She is more gasy (passing excessive flatus). Very bloated. When she was on gluten free diet would have 2 or more BM's per day. However, since resuming a regular diet, her bowels have changed. If she is nervous about something she has looser bowels and then might struggle to go or skip a day. May have difficulty with BM's 1-2x/week. Tries to eat a lot of fruits and vegetables. Still had all the same stomach sxs on GFD but bowels were more regular. She has questions about Celiac, IBS, leaky gut, Crohn's. No FH of IBD. Not sure if sxs are affected by eating. Eats broccoli several times per week. Soy milk, cottage cheese, yogurt. Tolerates ice cream.     Patient Active Problem List   Diagnosis Code    Family history of colon cancer requiring screening colonoscopy Z80.0    Dyspepsia R10.13    Diarrhea Q09.6    Periumbilical pain X92.88    Abnormal histology findings R89.7    History of gastric ulcer Z87.19     Social History     Socioeconomic History    Marital status:      Spouse name: Not on file    Number of children: Not on file    Years of education: Not on file    Highest education level: Not on file   Occupational History    Not on file   Social Needs    Financial resource strain: Not on file    Food insecurity     Worry: Not on file     Inability: Not on file    Transportation needs     Medical: Not on file     Non-medical: Not on file   Tobacco Use    Smoking status: Never Smoker    Smokeless tobacco: Never Used   Substance and Sexual Activity    Alcohol use: Yes     Comment: occasional wine    Drug use: No    Sexual activity: Not on file   Lifestyle    Physical activity     Days per week: Not on file     Minutes per session: Not on file    Stress: Not on file   Relationships    Social connections     Talks on phone: Not on file     Gets together: Not on file     Attends Sikhism service: Not on file     Active member of club or organization: Not on file     Attends meetings of clubs or organizations: Not on file     Relationship status: Not on file    Intimate partner violence     Fear of current or ex partner: Not on file     Emotionally abused: Not on file     Physically abused: Not on file     Forced sexual activity: Not on file   Other Topics Concern    Not on file   Social History Narrative    Not on file     Past Medical History:   Diagnosis Date    Abnormal histology findings 5/28/2020    ?  Celiac disease 2019    Arthritis     Chronic pain     arthritis/chronic headache daily    Diarrhea 2/28/2019    Family history of colon cancer requiring screening colonoscopy 1/29/2013    GERD (gastroesophageal reflux disease)     History of gastric ulcer 7/23/2020    Hypertension     Periumbilical pain 8/30/0098    PUD (peptic ulcer disease)      The patient has a family history of na    Prior to Admission Medications   Prescriptions Last Dose Informant Patient Reported? Taking? BISOPROLOL FUMARATE PO 7/23/2020 at Unknown time  Yes Yes   Sig: Take 2.5 mg by mouth daily. CALCIUM CARBONATE/VITAMIN D3 (CALCIUM + D PO) 7/22/2020 at Unknown time  Yes Yes   Sig: Take 1 Tab by mouth daily. docosahexanoic acid/epa (FISH OIL PO) 7/22/2020 at Unknown time  Yes Yes   Sig: Take 1 Cap by mouth daily. esomeprazole (NEXIUM) 40 mg capsule 7/21/2020  No No   Sig: Take 1 Cap by mouth daily for 180 days. famotidine (PEPCID) 40 mg tablet 7/22/2020 at Unknown time  Yes Yes   Sig: Take 40 mg by mouth daily. multivitamins-minerals-lutein (CENTRUM SILVER) Tab 7/22/2020 at Unknown time  Yes Yes   Sig: Take 1 Tab by mouth daily. vitamin P-L-V-lutein-minerals (OCUVITE) tablet 7/22/2020 at Unknown time  Yes Yes   Sig: daily. Facility-Administered Medications: None           The review of systems is:  negative for shortness of breath or chest pain      The heart, lungs, and mental status were satisfactory for the administration of anesthesia sedation and for the procedure. I discussed with the patient the objectives, risks, consequences and alternatives to the procedure. The patient was counseled at length about the risks of nain Covid-19 during their perioperative period and any recovery window from their procedure. The patient was made aware that nain Covid-19  may worsen their prognosis for recovering from their procedure and lend to a higher morbidity and/or mortality risk. All material risks, benefits, and reasonable alternatives including postponing the procedure were discussed. The patient does  wish to proceed with the procedure at this time.         Marly Hoskins, MD  7/23/2020  10:39 AM

## 2020-07-23 NOTE — ANESTHESIA POSTPROCEDURE EVALUATION
Procedure(s):  ESOPHAGOGASTRODUODENOSCOPY (EGD), COLONOSCOPY  COLONOSCOPY  ESOPHAGOGASTRODUODENAL (EGD) BIOPSY  COLON BIOPSY. total IV anesthesia    Anesthesia Post Evaluation        Patient location during evaluation: PACU  Note status: Adequate. Level of consciousness: responsive to verbal stimuli and sleepy but conscious  Pain management: satisfactory to patient  Airway patency: patent  Anesthetic complications: no  Cardiovascular status: acceptable  Respiratory status: acceptable  Hydration status: acceptable  Comments: +Post-Anesthesia Evaluation and Assessment    Patient: Natan Mae MRN: [id number]  SSN: [id number]   YOB: 1940  Age: 68 y.o. Sex: female      Cardiovascular Function/Vital Signs    /69   Pulse (!) 57   Temp 36.4 °C (97.6 °F)   Resp 11   Ht 5' 4\" (1.626 m)   Wt 51.9 kg (114 lb 5 oz)   SpO2 100%   Breastfeeding No   BMI 19.62 kg/m²     Patient is status post Procedure(s):  ESOPHAGOGASTRODUODENOSCOPY (EGD), COLONOSCOPY  COLONOSCOPY  ESOPHAGOGASTRODUODENAL (EGD) BIOPSY  COLON BIOPSY. Nausea/Vomiting: Controlled. Postoperative hydration reviewed and adequate. Pain:  Pain Scale 1: Numeric (0 - 10) (07/23/20 1153)  Pain Intensity 1: 0 (07/23/20 1153)   Managed. Neurological Status: At baseline. Mental Status and Level of Consciousness: Arousable. Pulmonary Status:   O2 Device: Room air (07/23/20 1153)   Adequate oxygenation and airway patent. Complications related to anesthesia: None    Post-anesthesia assessment completed. No concerns. Signed By: Yohan Edouard MD    7/23/2020  Post anesthesia nausea and vomiting:  controlled      INITIAL Post-op Vital signs:   Vitals Value Taken Time   /70 7/23/2020 11:56 AM   Temp 36.4 °C (97.6 °F) 7/23/2020 11:30 AM   Pulse 58 7/23/2020 11:57 AM   Resp 0 7/23/2020 11:58 AM   SpO2 100 % 7/23/2020 11:57 AM   Vitals shown include unvalidated device data.

## 2020-07-23 NOTE — PERIOP NOTES
Dimitris Caller  1940  [id number]    Situation:  Verbal report received from: Bety Diaz RN  Procedure: Procedure(s):  ESOPHAGOGASTRODUODENOSCOPY (EGD), COLONOSCOPY  COLONOSCOPY  ESOPHAGOGASTRODUODENAL (EGD) BIOPSY  COLON BIOPSY    Background:    Preoperative diagnosis: Loss of weight [R63.4]  Diarrhea of presumed infectious origin [R19.7]  Abdominal pain, epigastric [R10.13]  Family history of unspecified malignant neoplasm [Z80.9]  Gastroesophageal reflux disease, esophagitis presence not specified [K21.9]  Postoperative diagnosis: EGD: Gastric Ulcers  Colon: Negative colon, erythema ileum    :  Dr. Gabrielle Davidson  Assistant(s): Endoscopy RN-1: Salma Desir RN; John David    Specimens:   ID Type Source Tests Collected by Time Destination   1 : Duodenum Biopsies Preservative Duodenum  Joshua Pruitt MD 7/23/2020 1054 Pathology   2 : Stomach Biopsies Preservative Stomach  Joshua Pruitt MD 7/23/2020 1056 Pathology   3 : Ileum Biopsy Preservative Ileum  Joshua Pruitt MD 7/23/2020 1108 Pathology   4 : Random Colon Biopsy Preservative Colon  Joshua Pruitt MD 7/23/2020 1112 Pathology     H. Pylori  no    Assessment:  Intra-procedure medications   Anesthesia gave intra-procedure sedation and medications, see anesthesia flow sheet yes    Intravenous fluids: NS@ KVO     Vital signs stable     Abdominal assessment: round and soft     Recommendation:  Discharge patient per MD order.   Family or Friend   Permission to share finding with family or friend yes

## 2020-08-06 ENCOUNTER — HOSPITAL ENCOUNTER (OUTPATIENT)
Dept: CT IMAGING | Age: 80
Discharge: HOME | End: 2020-08-06
Attending: PHYSICIAN ASSISTANT
Payer: MEDICARE

## 2020-08-06 DIAGNOSIS — R85.7: ICD-10-CM

## 2020-08-06 DIAGNOSIS — K21.9 GASTROESOPHAGEAL REFLUX DISEASE: ICD-10-CM

## 2020-08-06 DIAGNOSIS — K25.9 GASTRIC ULCER: ICD-10-CM

## 2020-08-06 DIAGNOSIS — Z80.9 FAMILY HISTORY OF UNSPECIFIED MALIGNANT NEOPLASM: ICD-10-CM

## 2020-08-06 DIAGNOSIS — K31.7 POLYP OF DUODENUM: ICD-10-CM

## 2020-08-06 DIAGNOSIS — R19.4 CHANGE IN BOWEL HABITS: ICD-10-CM

## 2020-08-06 DIAGNOSIS — Z86.010 PERSONAL HISTORY OF COLONIC POLYPS: ICD-10-CM

## 2020-08-06 DIAGNOSIS — R63.4 ABNORMAL WEIGHT LOSS: ICD-10-CM

## 2020-08-06 DIAGNOSIS — Z79.899 LONG TERM USE OF DRUG: ICD-10-CM

## 2020-08-06 DIAGNOSIS — R19.7 DIARRHEA: ICD-10-CM

## 2020-08-06 DIAGNOSIS — R14.0 BLOATING SYMPTOM: ICD-10-CM

## 2020-08-06 DIAGNOSIS — R10.33 PERIUMBILICAL PAIN: ICD-10-CM

## 2020-08-06 DIAGNOSIS — K27.9 PEPTIC ULCER: ICD-10-CM

## 2020-08-06 DIAGNOSIS — Z79.1 LONG TERM CURRENT USE OF NON-STEROIDAL ANTI-INFLAMMATORIES (NSAID): ICD-10-CM

## 2020-08-06 PROCEDURE — 74177 CT ABD & PELVIS W/CONTRAST: CPT

## 2020-08-06 PROCEDURE — 74011636320 HC RX REV CODE- 636/320: Performed by: RADIOLOGY

## 2020-08-06 PROCEDURE — 74011000258 HC RX REV CODE- 258: Performed by: RADIOLOGY

## 2020-08-06 RX ORDER — SODIUM CHLORIDE 0.9 % (FLUSH) 0.9 %
10 SYRINGE (ML) INJECTION
Status: COMPLETED | OUTPATIENT
Start: 2020-08-06 | End: 2020-08-06

## 2020-08-06 RX ADMIN — SODIUM CHLORIDE 100 ML: 900 INJECTION, SOLUTION INTRAVENOUS at 11:38

## 2020-08-06 RX ADMIN — IOHEXOL 50 ML: 240 INJECTION, SOLUTION INTRATHECAL; INTRAVASCULAR; INTRAVENOUS; ORAL at 11:38

## 2020-08-06 RX ADMIN — Medication 10 ML: at 11:38

## 2020-08-06 RX ADMIN — IOPAMIDOL 100 ML: 755 INJECTION, SOLUTION INTRAVENOUS at 11:38

## 2020-10-12 ENCOUNTER — TRANSCRIBE ORDER (OUTPATIENT)
Dept: SCHEDULING | Age: 80
End: 2020-10-12

## 2020-10-12 DIAGNOSIS — R14.0 BLOATING SYMPTOM: ICD-10-CM

## 2020-10-12 DIAGNOSIS — R85.7: ICD-10-CM

## 2020-10-12 DIAGNOSIS — A04.9 BACTERIAL ENTERITIS: ICD-10-CM

## 2020-10-12 DIAGNOSIS — K25.9 GASTRIC ULCER: ICD-10-CM

## 2020-10-12 DIAGNOSIS — R93.3 ABNORMAL FINDINGS ON DIAGNOSTIC IMAGING OF DIGESTIVE SYSTEM: Primary | ICD-10-CM

## 2020-10-17 ENCOUNTER — HOSPITAL ENCOUNTER (OUTPATIENT)
Dept: PREADMISSION TESTING | Age: 80
Discharge: HOME | End: 2020-10-17
Payer: MEDICARE

## 2020-10-17 PROCEDURE — 87635 SARS-COV-2 COVID-19 AMP PRB: CPT

## 2020-10-18 LAB
HEALTH STATUS, XMCV2T: NORMAL
SARS-COV-2, COV2NT: NOT DETECTED
SOURCE, COVRS: NORMAL
SPECIMEN SOURCE, FCOV2M: NORMAL
SPECIMEN TYPE, XMCV1T: NORMAL

## 2020-10-20 ENCOUNTER — HOSPITAL ENCOUNTER (OUTPATIENT)
Dept: ULTRASOUND IMAGING | Age: 80
Discharge: HOME | End: 2020-10-20
Attending: SPECIALIST
Payer: MEDICARE

## 2020-10-20 DIAGNOSIS — R85.7: ICD-10-CM

## 2020-10-20 DIAGNOSIS — A04.9 BACTERIAL ENTERITIS: ICD-10-CM

## 2020-10-20 DIAGNOSIS — R14.0 BLOATING SYMPTOM: ICD-10-CM

## 2020-10-20 DIAGNOSIS — K25.9 GASTRIC ULCER: ICD-10-CM

## 2020-10-20 DIAGNOSIS — R93.3 ABNORMAL FINDINGS ON DIAGNOSTIC IMAGING OF DIGESTIVE SYSTEM: ICD-10-CM

## 2020-10-20 PROCEDURE — 76705 ECHO EXAM OF ABDOMEN: CPT

## 2020-10-21 ENCOUNTER — ANESTHESIA (OUTPATIENT)
Dept: ENDOSCOPY | Age: 80
End: 2020-10-21
Payer: MEDICARE

## 2020-10-21 ENCOUNTER — HOSPITAL ENCOUNTER (OUTPATIENT)
Age: 80
Setting detail: OUTPATIENT SURGERY
Discharge: HOME | End: 2020-10-21
Attending: INTERNAL MEDICINE | Admitting: INTERNAL MEDICINE
Payer: MEDICARE

## 2020-10-21 ENCOUNTER — ANESTHESIA EVENT (OUTPATIENT)
Dept: ENDOSCOPY | Age: 80
End: 2020-10-21
Payer: MEDICARE

## 2020-10-21 VITALS
HEART RATE: 55 BPM | WEIGHT: 114 LBS | DIASTOLIC BLOOD PRESSURE: 61 MMHG | BODY MASS INDEX: 19.46 KG/M2 | OXYGEN SATURATION: 100 % | SYSTOLIC BLOOD PRESSURE: 190 MMHG | RESPIRATION RATE: 19 BRPM | TEMPERATURE: 98.4 F | HEIGHT: 64 IN

## 2020-10-21 PROCEDURE — 74011250636 HC RX REV CODE- 250/636: Performed by: NURSE ANESTHETIST, CERTIFIED REGISTERED

## 2020-10-21 PROCEDURE — 76040000008: Performed by: INTERNAL MEDICINE

## 2020-10-21 PROCEDURE — 88305 TISSUE EXAM BY PATHOLOGIST: CPT

## 2020-10-21 PROCEDURE — 2709999900 HC NON-CHARGEABLE SUPPLY: Performed by: INTERNAL MEDICINE

## 2020-10-21 PROCEDURE — 76060000033 HC ANESTHESIA 1 TO 1.5 HR: Performed by: INTERNAL MEDICINE

## 2020-10-21 PROCEDURE — 74011250636 HC RX REV CODE- 250/636: Performed by: INTERNAL MEDICINE

## 2020-10-21 PROCEDURE — 77030028690 HC NDL ASPIR ULTRSND BSC -E: Performed by: INTERNAL MEDICINE

## 2020-10-21 PROCEDURE — 77030019957 HC CUF BLN GASTSCP OCOA -B: Performed by: INTERNAL MEDICINE

## 2020-10-21 PROCEDURE — 77030016053: Performed by: INTERNAL MEDICINE

## 2020-10-21 PROCEDURE — 77030019988 HC FCPS ENDOSC DISP BSC -B: Performed by: INTERNAL MEDICINE

## 2020-10-21 PROCEDURE — 74011000250 HC RX REV CODE- 250: Performed by: NURSE ANESTHETIST, CERTIFIED REGISTERED

## 2020-10-21 PROCEDURE — 87077 CULTURE AEROBIC IDENTIFY: CPT | Performed by: INTERNAL MEDICINE

## 2020-10-21 PROCEDURE — 77030003406 HC NDL ASPIR BIOP OCOA -C: Performed by: INTERNAL MEDICINE

## 2020-10-21 RX ORDER — SUCRALFATE 1 G/10ML
1 SUSPENSION ORAL 4 TIMES DAILY
Qty: 560 ML | Refills: 0 | Status: SHIPPED | OUTPATIENT
Start: 2020-10-21 | End: 2020-11-04

## 2020-10-21 RX ORDER — MIDAZOLAM HYDROCHLORIDE 1 MG/ML
.25-5 INJECTION, SOLUTION INTRAMUSCULAR; INTRAVENOUS
Status: DISCONTINUED | OUTPATIENT
Start: 2020-10-21 | End: 2020-10-21 | Stop reason: HOSPADM

## 2020-10-21 RX ORDER — SODIUM CHLORIDE 0.9 % (FLUSH) 0.9 %
5-40 SYRINGE (ML) INJECTION AS NEEDED
Status: DISCONTINUED | OUTPATIENT
Start: 2020-10-21 | End: 2020-10-21 | Stop reason: HOSPADM

## 2020-10-21 RX ORDER — LEVOFLOXACIN 5 MG/ML
INJECTION, SOLUTION INTRAVENOUS AS NEEDED
Status: DISCONTINUED | OUTPATIENT
Start: 2020-10-21 | End: 2020-10-21

## 2020-10-21 RX ORDER — ATROPINE SULFATE 0.1 MG/ML
0.5 INJECTION INTRAVENOUS
Status: DISCONTINUED | OUTPATIENT
Start: 2020-10-21 | End: 2020-10-21 | Stop reason: HOSPADM

## 2020-10-21 RX ORDER — PROPOFOL 10 MG/ML
INJECTION, EMULSION INTRAVENOUS AS NEEDED
Status: DISCONTINUED | OUTPATIENT
Start: 2020-10-21 | End: 2020-10-21 | Stop reason: HOSPADM

## 2020-10-21 RX ORDER — DEXTROMETHORPHAN/PSEUDOEPHED 2.5-7.5/.8
1.2 DROPS ORAL
Status: DISCONTINUED | OUTPATIENT
Start: 2020-10-21 | End: 2020-10-21 | Stop reason: HOSPADM

## 2020-10-21 RX ORDER — PANTOPRAZOLE SODIUM 40 MG/1
40 TABLET, DELAYED RELEASE ORAL 2 TIMES DAILY
Qty: 60 TAB | Refills: 1 | Status: SHIPPED | OUTPATIENT
Start: 2020-10-21 | End: 2021-03-04

## 2020-10-21 RX ORDER — PROPOFOL 10 MG/ML
INJECTION, EMULSION INTRAVENOUS
Status: DISCONTINUED | OUTPATIENT
Start: 2020-10-21 | End: 2020-10-21 | Stop reason: HOSPADM

## 2020-10-21 RX ORDER — SODIUM CHLORIDE 0.9 % (FLUSH) 0.9 %
5-40 SYRINGE (ML) INJECTION EVERY 8 HOURS
Status: DISCONTINUED | OUTPATIENT
Start: 2020-10-21 | End: 2020-10-21 | Stop reason: HOSPADM

## 2020-10-21 RX ORDER — NALOXONE HYDROCHLORIDE 0.4 MG/ML
0.4 INJECTION, SOLUTION INTRAMUSCULAR; INTRAVENOUS; SUBCUTANEOUS
Status: DISCONTINUED | OUTPATIENT
Start: 2020-10-21 | End: 2020-10-21 | Stop reason: HOSPADM

## 2020-10-21 RX ORDER — PROPOFOL 10 MG/ML
INJECTION, EMULSION INTRAVENOUS
Status: DISCONTINUED | OUTPATIENT
Start: 2020-10-21 | End: 2020-10-21

## 2020-10-21 RX ORDER — FLUMAZENIL 0.1 MG/ML
0.2 INJECTION INTRAVENOUS
Status: DISCONTINUED | OUTPATIENT
Start: 2020-10-21 | End: 2020-10-21 | Stop reason: HOSPADM

## 2020-10-21 RX ORDER — SODIUM CHLORIDE 9 MG/ML
75 INJECTION, SOLUTION INTRAVENOUS CONTINUOUS
Status: DISCONTINUED | OUTPATIENT
Start: 2020-10-21 | End: 2020-10-21 | Stop reason: HOSPADM

## 2020-10-21 RX ORDER — LIDOCAINE HYDROCHLORIDE 20 MG/ML
INJECTION, SOLUTION EPIDURAL; INFILTRATION; INTRACAUDAL; PERINEURAL AS NEEDED
Status: DISCONTINUED | OUTPATIENT
Start: 2020-10-21 | End: 2020-10-21 | Stop reason: HOSPADM

## 2020-10-21 RX ORDER — EPINEPHRINE 0.1 MG/ML
1 INJECTION INTRACARDIAC; INTRAVENOUS
Status: DISCONTINUED | OUTPATIENT
Start: 2020-10-21 | End: 2020-10-21 | Stop reason: HOSPADM

## 2020-10-21 RX ADMIN — SODIUM CHLORIDE: 900 INJECTION, SOLUTION INTRAVENOUS at 12:28

## 2020-10-21 RX ADMIN — LIDOCAINE HYDROCHLORIDE 60 MG: 20 INJECTION, SOLUTION EPIDURAL; INFILTRATION; INTRACAUDAL; PERINEURAL at 12:38

## 2020-10-21 RX ADMIN — PROPOFOL 100 MG: 10 INJECTION, EMULSION INTRAVENOUS at 12:38

## 2020-10-21 RX ADMIN — PROPOFOL 75 MCG/KG/MIN: 10 INJECTION, EMULSION INTRAVENOUS at 12:45

## 2020-10-21 RX ADMIN — PROPOFOL 20 MG: 10 INJECTION, EMULSION INTRAVENOUS at 12:44

## 2020-10-21 NOTE — PROCEDURES
[place] Office: [phone]      Esophagogastroduodenoscopy Procedure Note      Sherwin Rodriguez  1940  [id number]    Indication: Hx of PUD and abdominal pain    : Yanet Tai MD    Referring Provider:  Mallory Jaramillo MD    Sedation:  MAC anesthesia Propofol    Procedure Details:  After detailed informed consent was obtained for the procedure, with all risks and benefits of procedure explained the patient was taken to the endoscopy suite and placed in the left lateral decubitus position. Following sequential administration of sedation as per above, the endoscope was inserted into the mouth and advanced under direct vision to second portion of the duodenum. A careful inspection was made as the gastroscope was withdrawn, including a retroflexed view of the proximal stomach; findings and interventions are described below. Findings:     Esophagus: The esophageal mucosa in the proximal, mid and distal esophagus is normal.   The squamo-columnar junction is at 40 cm where the Z-line was noted. Stomach:   3 small ulcers are noted in the antrum( 2 posterior wall, 1 inferior): all of them have a white base and mild erythema around them. Bite-on-bite deep biopsies taken of the ulcers  The gastric mucosa has mild diffuse erythema. Biopsies taken for pathology. The fundus was found to be normal with no lesions noted on retroflexion. The angularis is normal as well. Duodenum:   The bulb and post bulbar mucosa is normal in appearance. The duodenal folds are normal. Biopsies taken (Inc IELs on biopsies in duodenal mucosa in the past)    Therapies:  biopsy of stomach antrum ulcers and for BJORN testing    Specimen:Specimens were collected as described and send to the laboratory. Complications:   None were encountered during the procedure. EBL:  None.           Recommendations:     -Acid suppression with BID proton pump inhibitor and carafate,   -Await BJORN test result and treat for Helicobacter pylori if positive. ,   -No NSAIDS,   -See EUS. Brody Delaney MD  10/21/2020  12:50 PM

## 2020-10-21 NOTE — ANESTHESIA PREPROCEDURE EVALUATION
Anesthetic History   No history of anesthetic complications            Review of Systems / Medical History  Patient summary reviewed, nursing notes reviewed and pertinent labs reviewed    Pulmonary  Within defined limits                 Neuro/Psych         Headaches and psychiatric history     Cardiovascular  Within defined limits  Hypertension              Exercise tolerance: >4 METS     GI/Hepatic/Renal  Within defined limits   GERD      PUD     Endo/Other  Within defined limits      Arthritis     Other Findings              Physical Exam    Airway  Mallampati: II  TM Distance: 4 - 6 cm  Neck ROM: normal range of motion   Mouth opening: Normal     Cardiovascular  Regular rate and rhythm,  S1 and S2 normal,  no murmur, click, rub, or gallop             Dental  No notable dental hx       Pulmonary  Breath sounds clear to auscultation               Abdominal  GI exam deferred       Other Findings            Anesthetic Plan    ASA: 2  Anesthesia type: general and total IV anesthesia          Induction: Intravenous  Anesthetic plan and risks discussed with: Patient      Propofol MAC

## 2020-10-21 NOTE — ANESTHESIA POSTPROCEDURE EVALUATION
Procedure(s):  LINEAR ENDOSCOPIC ULTRASOUND (EUS)  ESOPHAGOGASTRODUODENOSCOPY (EGD)  ESOPHAGOGASTRODUODENAL (EGD) BIOPSY  FINE NEEDLE BIOPSY. general, total IV anesthesia    Anesthesia Post Evaluation        Patient location during evaluation: PACU  Note status: Adequate. Level of consciousness: responsive to verbal stimuli and sleepy but conscious  Pain management: satisfactory to patient  Airway patency: patent  Anesthetic complications: no  Cardiovascular status: acceptable  Respiratory status: acceptable  Hydration status: acceptable  Comments: +Post-Anesthesia Evaluation and Assessment    Patient: Barbara Drummond MRN: [id number]  SSN: [id number]   YOB: 1940  Age: 68 y.o. Sex: female      Cardiovascular Function/Vital Signs    BP (!) 190/61   Pulse (!) 55   Temp 36.9 °C (98.4 °F)   Resp 19   Ht 5' 4\" (1.626 m)   Wt 51.7 kg (114 lb)   SpO2 100%   Breastfeeding No   BMI 19.57 kg/m²     Patient is status post Procedure(s):  LINEAR ENDOSCOPIC ULTRASOUND (EUS)  ESOPHAGOGASTRODUODENOSCOPY (EGD)  ESOPHAGOGASTRODUODENAL (EGD) BIOPSY  FINE NEEDLE BIOPSY. Nausea/Vomiting: Controlled. Postoperative hydration reviewed and adequate. Pain:  Pain Scale 1: Numeric (0 - 10) (10/21/20 1404)  Pain Intensity 1: 0 (10/21/20 1404)   Managed. Neurological Status: At baseline. Mental Status and Level of Consciousness: Arousable. Pulmonary Status:   O2 Device: Room air (10/21/20 1404)   Adequate oxygenation and airway patent. Complications related to anesthesia: None    Post-anesthesia assessment completed. No concerns.     Signed By: Kerry Rocha MD    10/21/2020  Post anesthesia nausea and vomiting:  controlled      INITIAL Post-op Vital signs:   Vitals Value Taken Time   /61 10/21/2020  2:09 PM   Temp 36.9 °C (98.4 °F) 10/21/2020  1:45 PM   Pulse 55 10/21/2020  2:11 PM   Resp 19 10/21/2020  2:11 PM   SpO2 100 % 10/21/2020  2:11 PM   Vitals shown include unvalidated device data.

## 2020-10-21 NOTE — DISCHARGE INSTRUCTIONS
[place] Office: [phone]    Pavithra Cobb  [id number]  1940    EGD/COLONOSCOPY DISCHARGE INSTRUCTIONS  Discomfort:  Sore throat- throat lozenges or warm salt water gargle  redness at IV site- apply warm compress to area; if redness or soreness persist- contact your physician  Gaseous discomfort- walking, belching will help relieve any discomfort  You may not operate a vehicle for 12 hours  You may not engage in an occupation involving machinery or appliances for rest of today. You may not drink alcoholic beverages for at least 12 hours  Avoid making any critical decisions for at least 24 hour  DIET  You may resume your regular diet - however -  remember your colon is empty and a heavy meal will produce gas. Avoid these foods:  fried / greasy foods, excessive carbonated drinks or too much caffeine  MEDICATIONS   Regarding Aspirin or Nonsteroidal medications specifically, please see below. ACTIVITY  You may resume your normal daily activities. Spend the remainder of the day resting -  avoid any strenuous activity. CALL M.D. ANY SIGN OF   Increasing pain, nausea, vomiting  Abdominal distension (swelling)  New increased bleeding (oral or rectal)  Fever (chills)  Pain in chest area  Bloody discharge from nose or mouth  Shortness of breath    You may not take any Advil, Aspirin, Ibuprofen, Motrin, Aleve, or Goodys for 7 days, ONLY  Tylenol as needed for pain. Follow-up Instructions:   Call  Brody Claire MD for any questions or concerns  Results of procedure / biopsy in 7 days   Telephone # [phone]      Follow-up Information    None

## 2020-10-21 NOTE — PROGRESS NOTES
Ana Lilia Najera  1940  [id number]    Situation:  Verbal report received from: Jaime De La Rosa RN  Procedure: Procedure(s):  LINEAR ENDOSCOPIC ULTRASOUND (EUS)  ESOPHAGOGASTRODUODENOSCOPY (EGD)  ESOPHAGOGASTRODUODENAL (EGD) BIOPSY  FINE NEEDLE BIOPSY    Background:    Preoperative diagnosis: ABNORMAL WEIGHT LOSS, ACUTE DIARRHEA, BURNING APIGASTRIC PAIN, BLOATING SYMPTOM, DUPDENAL POLPECTOMY, GASTRIC ULCER, GERD  Postoperative diagnosis: EGD: Gastric Ulcers    :  Dr. Tay Ortiz  Assistant(s): Endoscopy RN-1: Lesa Hewitt RN; Keith Gasca    Specimens:   ID Type Source Tests Collected by Time Destination   1 : Biopsy Preservative Duodenum  Norma Johnson MD 10/21/2020 1248 Pathology   2 : gastric ulcer biopsies Preservative Gastric  Norma Johnson MD 10/21/2020 1256 Pathology     H. Pylori  yes    Assessment:  Intra-procedure medications   Anesthesia gave intra-procedure sedation and medications, see anesthesia flow sheet yes    Intravenous fluids: NS@ KVO     Vital signs stable     Abdominal assessment: round and soft     Recommendation:  Discharge patient per MD order.   Family or Friend -  Dayna Jimenez to share finding with family or friend

## 2020-10-21 NOTE — H&P
Pre-endoscopy H and P    The patient was seen and examined in the room/pre-op holding area. The airway was assessed and documented. The problem list, past medical history, and medications were reviewed. Patient Active Problem List   Diagnosis Code    Family history of colon cancer requiring screening colonoscopy Z80.0    Dyspepsia R10.13    Diarrhea U67.0    Periumbilical pain O65.24    Abnormal histology findings R89.7    History of gastric ulcer Z87.19     Social History     Socioeconomic History    Marital status:      Spouse name: Not on file    Number of children: Not on file    Years of education: Not on file    Highest education level: Not on file   Occupational History    Not on file   Social Needs    Financial resource strain: Not on file    Food insecurity     Worry: Not on file     Inability: Not on file    Transportation needs     Medical: Not on file     Non-medical: Not on file   Tobacco Use    Smoking status: Never Smoker    Smokeless tobacco: Never Used   Substance and Sexual Activity    Alcohol use: Yes     Comment: occasional wine    Drug use: No    Sexual activity: Not on file   Lifestyle    Physical activity     Days per week: Not on file     Minutes per session: Not on file    Stress: Not on file   Relationships    Social connections     Talks on phone: Not on file     Gets together: Not on file     Attends Gnosticist service: Not on file     Active member of club or organization: Not on file     Attends meetings of clubs or organizations: Not on file     Relationship status: Not on file    Intimate partner violence     Fear of current or ex partner: Not on file     Emotionally abused: Not on file     Physically abused: Not on file     Forced sexual activity: Not on file   Other Topics Concern    Not on file   Social History Narrative    Not on file     Past Medical History:   Diagnosis Date    Abnormal histology findings 5/28/2020    ?  Celiac disease 2019  Arthritis     Chronic pain     arthritis/chronic headache daily    Diarrhea 2/28/2019    Family history of colon cancer requiring screening colonoscopy 1/29/2013    GERD (gastroesophageal reflux disease)     High cholesterol     History of gastric ulcer 7/23/2020    Hypertension     Periumbilical pain 3/94/5656    Psychiatric disorder     ANXIETY AND DEPRESSION    PUD (peptic ulcer disease)          Prior to Admission Medications   Prescriptions Last Dose Informant Patient Reported? Taking? BISOPROLOL FUMARATE PO 10/21/2020 at Unknown time  Yes Yes   Sig: Take 2.5 mg by mouth daily. CALCIUM CARBONATE/VITAMIN D3 (CALCIUM + D PO) 10/20/2020 at Unknown time  Yes Yes   Sig: Take 1 Tab by mouth daily. docosahexanoic acid/epa (FISH OIL PO) 10/20/2020 at Unknown time  Yes Yes   Sig: Take 1 Cap by mouth daily. famotidine (PEPCID) 40 mg tablet 10/20/2020 at Unknown time  Yes Yes   Sig: Take 40 mg by mouth two (2) times a day. multivitamins-minerals-lutein (CENTRUM SILVER) Tab 10/20/2020 at Unknown time  Yes Yes   Sig: Take 1 Tab by mouth daily. vitamin N-L-D-lutein-minerals (OCUVITE) tablet 10/20/2020 at Unknown time  Yes Yes   Sig: Take 1 Tab by mouth every Monday, Wednesday, Friday. Facility-Administered Medications: None           The review of systems is:  Negative  for shortness of breath or chest pain      The heart, lungs, and mental status were satisfactory for the administration of deep sedation and for the procedure. I discussed with the patient the objectives, risks, consequences and alternatives to the procedure.       Mar Perkins MD  10/21/2020  12:34 PM

## 2020-10-21 NOTE — PROCEDURES
NAME:  Melissa Pink   :   1940   MRN:   [id number]     [facility]    Date/Time:  10/21/2020   Procedure Type: radial and linear EUS     Indications: pain in abdomen, non healing ulcers(gastric)    Pre-operative Diagnosis: see indication above    Post-operative Diagnosis:  See findings below    : Remy Madison MD    Referring Provider: --Ignacio Martines MD    Procedure Details:    Exam:  Airway: clear, no airway problems anticipated  Heart: RRR, without gallops or rubs  Lungs: clear bilaterally without wheezes, crackles, or rhonchi  Abdomen: soft, nontender, nondistended, bowel sounds present  Mental Status: awake, alert and oriented to person, place and time     Anethesia/Sedation:  MAC anesthesia Propofol      Procedure Details   After a detailed informed consent was obtained for the procedure, with all risks and benefits of procedure explained the patient was taken to the endoscopy suite and placed in the left lateral decubitus position. Following sequential administration of sedation as per above, the radial/linear echoendoscope was inserted into the mouth and advanced under direct vision to second portion of the duodenum. A careful inspection was made as the gastroscope was withdrawn, including a retroflexed view of the proximal stomach; findings and interventions are described below. Findings:     Endoscopic:-See EGD    Ultrasound:   Esophagus: normal findings   Stomach: antral wall is thickened(mucosa and submucosa) from 5- 8 mm. Muscularis propria is normal.  EUS FNB x 3 done with a 22 gauge needle   Pancreas:     Areas examined: the head, the genu, the body, the tail    Parenchyma: -Appeared normal on radial EUS but on linear a cystic area is noted in proximity of head /?  Renal cyst. I did not do an FNA as a result   Liver:     Parenchyma: normal    Gallbladder: normal    Bile Duct: the common bile duct is normal appearing               Lymph Node: no adenopathy            Complications: None. EBL:  None. Interventions: Fine needle biopsies  performed of the gastric wall using a 22 gauge needle with 3 pass/es. Path pending. Recommendations:     Await path  Treat ulcer aggressively. Suggest MRI/MRCP. If normal repeat EGD in 2 months to confirm ulcer healing. Carey Nielson MD

## 2020-10-29 ENCOUNTER — TRANSCRIBE ORDER (OUTPATIENT)
Dept: SCHEDULING | Age: 80
End: 2020-10-29

## 2020-10-29 DIAGNOSIS — K25.9 GASTRIC ULCER: ICD-10-CM

## 2020-10-29 DIAGNOSIS — R10.13 ABDOMINAL PAIN, EPIGASTRIC: ICD-10-CM

## 2020-10-29 DIAGNOSIS — Z80.9 FAMILY HISTORY OF UNSPECIFIED MALIGNANT NEOPLASM: ICD-10-CM

## 2020-10-29 DIAGNOSIS — R85.7: ICD-10-CM

## 2020-10-29 DIAGNOSIS — K56.609 PEPTIC ULCER WITHOUT HEMORRHAGE OR PERFORATION BUT WITH OBSTRUCTION (HCC): ICD-10-CM

## 2020-10-29 DIAGNOSIS — R14.0 BLOATING: ICD-10-CM

## 2020-10-29 DIAGNOSIS — R93.3 ABNORMAL FINDINGS ON EXAMINATION OF GASTROINTESTINAL TRACT: Primary | ICD-10-CM

## 2020-10-29 DIAGNOSIS — K27.9 PEPTIC ULCER WITHOUT HEMORRHAGE OR PERFORATION BUT WITH OBSTRUCTION (HCC): ICD-10-CM

## 2020-10-29 DIAGNOSIS — R19.7 DIARRHEA OF PRESUMED INFECTIOUS ORIGIN: ICD-10-CM

## 2020-10-29 DIAGNOSIS — R63.4 ABNORMAL WEIGHT LOSS: ICD-10-CM

## 2021-02-24 RX ORDER — ESOMEPRAZOLE MAGNESIUM 40 MG/1
40 CAPSULE, DELAYED RELEASE ORAL 2 TIMES DAILY
Status: ON HOLD | COMMUNITY
End: 2022-04-16

## 2021-02-24 RX ORDER — BISOPROLOL FUMARATE 5 MG/1
5 TABLET ORAL DAILY
COMMUNITY
End: 2021-08-18

## 2021-02-24 NOTE — PERIOP NOTES
[facility]  Ambulatory Surgery Unit  Pre-operative Instructions for Endo Procedures    Procedure Date  March 5, 2021            Tentative Arrival Time 8:15AM      1. On the day of your procedure, please report to the Ambulatory Surgery Unit Registration Desk and sign in at your designated time. The Ambulatory Surgery Unit is located in [facility] on the Critical access hospital side of the [facility] across from the [address] [place] [address]. Please have all of your health insurance cards and a photo ID. 2. You must have someone with you to drive you home, as you should not drive a car for 24 hours following anesthesia. Please make arrangements for a responsible adult friend or family member to stay with you for at least the first 24 hours after your procedure. 3. Do not have anything to eat or drink (including water, gum, mints, coffee, juice) after 11:59 PM, March 4th. This may not apply to medications prescribed by your physician. (Please note below the special instructions with medications to take the morning of your procedure.)    4. If applicable, follow the clear liquid diet and bowel prep instructions provided by your physician's office. If you do not have this information, or have any questions, please contact your physician's office. 5. We recommend you do not drink any alcoholic beverages for 24 hours before and after your procedure. 6. Contact your surgeons office for instructions on the following medications: non-steroidal anti-inflammatory drugs (i.e. Advil, Aleve), vitamins, and supplements. (Some surgeons will want you to stop these medications prior to surgery and others may allow you to take them)   **If you are currently taking Plavix, Coumadin, Aspirin and/or other blood-thinning agents, contact your surgeon for instructions. ** Your surgeon will partner with the physician prescribing these medications to determine if it is safe to stop or if you need to continue taking.  Please do not stop taking these medications without instructions from your surgeon. 7. In an effort to help prevent surgical site infection, we ask that you shower with an anti-bacterial soap (i.e. Dial or Safeguard) on the morning of your procedure. Do not apply any lotions, powders, or deodorants after showering. 8. Wear comfortable clothes. Wear glasses instead of contacts. Do not bring any jewelry or money (other than copays or fees as instructed). Do not wear make-up, particularly mascara, the morning of your procedure. Wear your hair loose or down, no ponytails, buns, kimberlyn pins or clips. All body piercings must be removed. 9. You should understand that if you do not follow these instructions your procedure may be cancelled. If your physical condition changes (i.e. fever, cold or flu) please contact your surgeon as soon as possible. 10. It is important that you be on time. If a situation occurs where you may be late, or if you have any questions or problems, please call [phone]. 11. Your procedure time may be subject to change. You will receive a phone call the day prior to confirm your arrival time. Special Instructions: Take all medications and inhalers, as prescribed, on the morning of surgery with a sip of water. I understand a pre-operative phone call will be made to verify my procedure time. In the event that I am not available, I give permission for a message to be left on my answering service and/or with another person?      yes         ___________________      ___________________      ___________________  (Signature of Patient)          (Witness)                   (Date and Time)

## 2021-03-01 ENCOUNTER — HOSPITAL ENCOUNTER (OUTPATIENT)
Dept: PREADMISSION TESTING | Age: 81
Discharge: HOME | End: 2021-03-01
Payer: MEDICARE

## 2021-03-01 LAB — SARS-COV-2, COV2: NORMAL

## 2021-03-01 PROCEDURE — U0003 INFECTIOUS AGENT DETECTION BY NUCLEIC ACID (DNA OR RNA); SEVERE ACUTE RESPIRATORY SYNDROME CORONAVIRUS 2 (SARS-COV-2) (CORONAVIRUS DISEASE [COVID-19]), AMPLIFIED PROBE TECHNIQUE, MAKING USE OF HIGH THROUGHPUT TECHNOLOGIES AS DESCRIBED BY CMS-2020-01-R: HCPCS

## 2021-03-02 LAB — SARS-COV-2, COV2NT: NOT DETECTED

## 2021-03-05 ENCOUNTER — ANESTHESIA (OUTPATIENT)
Dept: ENDOSCOPY | Age: 81
End: 2021-03-05
Payer: MEDICARE

## 2021-03-05 ENCOUNTER — HOSPITAL ENCOUNTER (OUTPATIENT)
Age: 81
Setting detail: OUTPATIENT SURGERY
Discharge: HOME | End: 2021-03-05
Attending: SPECIALIST | Admitting: SPECIALIST
Payer: MEDICARE

## 2021-03-05 ENCOUNTER — ANESTHESIA EVENT (OUTPATIENT)
Dept: ENDOSCOPY | Age: 81
End: 2021-03-05
Payer: MEDICARE

## 2021-03-05 VITALS
HEART RATE: 58 BPM | RESPIRATION RATE: 18 BRPM | WEIGHT: 114 LBS | HEIGHT: 64 IN | BODY MASS INDEX: 19.46 KG/M2 | DIASTOLIC BLOOD PRESSURE: 60 MMHG | TEMPERATURE: 97.8 F | OXYGEN SATURATION: 98 % | SYSTOLIC BLOOD PRESSURE: 156 MMHG

## 2021-03-05 PROCEDURE — 74011000250 HC RX REV CODE- 250: Performed by: NURSE ANESTHETIST, CERTIFIED REGISTERED

## 2021-03-05 PROCEDURE — 76040000019: Performed by: SPECIALIST

## 2021-03-05 PROCEDURE — 77030019988 HC FCPS ENDOSC DISP BSC -B: Performed by: SPECIALIST

## 2021-03-05 PROCEDURE — 74011250636 HC RX REV CODE- 250/636: Performed by: SPECIALIST

## 2021-03-05 PROCEDURE — 2709999900 HC NON-CHARGEABLE SUPPLY: Performed by: SPECIALIST

## 2021-03-05 PROCEDURE — 74011250636 HC RX REV CODE- 250/636: Performed by: NURSE ANESTHETIST, CERTIFIED REGISTERED

## 2021-03-05 PROCEDURE — 76060000031 HC ANESTHESIA FIRST 0.5 HR: Performed by: SPECIALIST

## 2021-03-05 PROCEDURE — 88305 TISSUE EXAM BY PATHOLOGIST: CPT

## 2021-03-05 RX ORDER — EPINEPHRINE 0.1 MG/ML
1 INJECTION INTRACARDIAC; INTRAVENOUS
Status: CANCELLED | OUTPATIENT
Start: 2021-03-05 | End: 2021-03-06

## 2021-03-05 RX ORDER — FLUMAZENIL 0.1 MG/ML
0.2 INJECTION INTRAVENOUS
Status: CANCELLED | OUTPATIENT
Start: 2021-03-05 | End: 2021-03-05

## 2021-03-05 RX ORDER — SODIUM CHLORIDE 0.9 % (FLUSH) 0.9 %
5-40 SYRINGE (ML) INJECTION AS NEEDED
Status: CANCELLED | OUTPATIENT
Start: 2021-03-05

## 2021-03-05 RX ORDER — PROPOFOL 10 MG/ML
INJECTION, EMULSION INTRAVENOUS AS NEEDED
Status: DISCONTINUED | OUTPATIENT
Start: 2021-03-05 | End: 2021-03-05 | Stop reason: HOSPADM

## 2021-03-05 RX ORDER — SODIUM CHLORIDE 9 MG/ML
100 INJECTION, SOLUTION INTRAVENOUS CONTINUOUS
Status: CANCELLED | OUTPATIENT
Start: 2021-03-05 | End: 2021-03-05

## 2021-03-05 RX ORDER — SODIUM CHLORIDE 0.9 % (FLUSH) 0.9 %
5-40 SYRINGE (ML) INJECTION EVERY 8 HOURS
Status: CANCELLED | OUTPATIENT
Start: 2021-03-05

## 2021-03-05 RX ORDER — PHENYLEPHRINE HCL IN 0.9% NACL 0.4MG/10ML
SYRINGE (ML) INTRAVENOUS AS NEEDED
Status: DISCONTINUED | OUTPATIENT
Start: 2021-03-05 | End: 2021-03-05 | Stop reason: HOSPADM

## 2021-03-05 RX ORDER — DEXTROMETHORPHAN/PSEUDOEPHED 2.5-7.5/.8
1.2 DROPS ORAL
Status: CANCELLED | OUTPATIENT
Start: 2021-03-05

## 2021-03-05 RX ORDER — ATROPINE SULFATE 0.1 MG/ML
0.5 INJECTION INTRAVENOUS
Status: CANCELLED | OUTPATIENT
Start: 2021-03-05 | End: 2021-03-06

## 2021-03-05 RX ORDER — NALOXONE HYDROCHLORIDE 0.4 MG/ML
0.4 INJECTION, SOLUTION INTRAMUSCULAR; INTRAVENOUS; SUBCUTANEOUS
Status: CANCELLED | OUTPATIENT
Start: 2021-03-05 | End: 2021-03-05

## 2021-03-05 RX ORDER — LIDOCAINE HYDROCHLORIDE 20 MG/ML
INJECTION, SOLUTION EPIDURAL; INFILTRATION; INTRACAUDAL; PERINEURAL AS NEEDED
Status: DISCONTINUED | OUTPATIENT
Start: 2021-03-05 | End: 2021-03-05 | Stop reason: HOSPADM

## 2021-03-05 RX ORDER — SODIUM CHLORIDE 9 MG/ML
50 INJECTION, SOLUTION INTRAVENOUS CONTINUOUS
Status: DISCONTINUED | OUTPATIENT
Start: 2021-03-05 | End: 2021-03-05 | Stop reason: HOSPADM

## 2021-03-05 RX ORDER — TRAZODONE HYDROCHLORIDE 50 MG/1
50 TABLET ORAL
COMMUNITY

## 2021-03-05 RX ORDER — MIDAZOLAM HYDROCHLORIDE 1 MG/ML
.25-5 INJECTION, SOLUTION INTRAMUSCULAR; INTRAVENOUS
Status: CANCELLED | OUTPATIENT
Start: 2021-03-05 | End: 2021-03-05

## 2021-03-05 RX ADMIN — Medication 120 MCG: at 10:51

## 2021-03-05 RX ADMIN — SODIUM CHLORIDE 50 ML/HR: 9 INJECTION, SOLUTION INTRAVENOUS at 10:14

## 2021-03-05 RX ADMIN — PROPOFOL 50 MG: 10 INJECTION, EMULSION INTRAVENOUS at 10:53

## 2021-03-05 RX ADMIN — PROPOFOL 100 MG: 10 INJECTION, EMULSION INTRAVENOUS at 10:48

## 2021-03-05 RX ADMIN — LIDOCAINE HYDROCHLORIDE 100 MG: 20 INJECTION, SOLUTION EPIDURAL; INFILTRATION; INTRACAUDAL; PERINEURAL at 10:48

## 2021-03-05 RX ADMIN — Medication 80 MCG: at 10:54

## 2021-03-05 NOTE — H&P
Pre-endoscopy H and P    The patient was seen and examined in the endoscopy suite. The airway was assessed and docuemented. The problem list, past medical history, and medications were reviewed. Patient Active Problem List   Diagnosis Code    Family history of colon cancer requiring screening colonoscopy Z80.0    Dyspepsia R10.13    Diarrhea K67.8    Periumbilical pain X47.23    Abnormal histology findings R89.7    History of gastric ulcer Z87.19     Social History     Socioeconomic History    Marital status:      Spouse name: Not on file    Number of children: Not on file    Years of education: Not on file    Highest education level: Not on file   Occupational History    Not on file   Social Needs    Financial resource strain: Not on file    Food insecurity     Worry: Not on file     Inability: Not on file    Transportation needs     Medical: Not on file     Non-medical: Not on file   Tobacco Use    Smoking status: Never Smoker    Smokeless tobacco: Never Used   Substance and Sexual Activity    Alcohol use: Not Currently    Drug use: No    Sexual activity: Not on file   Lifestyle    Physical activity     Days per week: Not on file     Minutes per session: Not on file    Stress: Not on file   Relationships    Social connections     Talks on phone: Not on file     Gets together: Not on file     Attends [employer] service: Not on file     Active member of club or organization: Not on file     Attends meetings of clubs or organizations: Not on file     Relationship status: Not on file    Intimate partner violence     Fear of current or ex partner: Not on file     Emotionally abused: Not on file     Physically abused: Not on file     Forced sexual activity: Not on file   Other Topics Concern    Not on file   Social History Narrative    Not on file     Past Medical History:   Diagnosis Date    Abnormal histology findings 5/28/2020    ?  Celiac disease 2019    Arthritis     Chronic pain     arthritis/chronic headache daily    Diarrhea 2/28/2019    Family history of colon cancer requiring screening colonoscopy 1/29/2013    GERD (gastroesophageal reflux disease)     High cholesterol     History of gastric ulcer 7/23/2020    Hypertension     Non-alcoholic fatty liver disease 6474    Periumbilical pain 7/59/7305    Psychiatric disorder     ANXIETY AND DEPRESSION    PUD (peptic ulcer disease)      The patient has a family history of na    Prior to Admission Medications   Prescriptions Last Dose Informant Patient Reported? Taking? CALCIUM CARBONATE/VITAMIN D3 (CALCIUM + D PO) 2/26/2021  Yes Yes   Sig: Take 1 Tab by mouth daily. bisoprolol (ZEBETA) 5 mg tablet 3/4/2021 at Unknown time  Yes Yes   Sig: Take 5 mg by mouth daily. docosahexanoic acid/epa (FISH OIL PO) 3/4/2021 at Unknown time  Yes Yes   Sig: Take 1 Cap by mouth daily. esomeprazole (NexIUM) 40 mg capsule 3/4/2021 at Unknown time  Yes Yes   Sig: Take 40 mg by mouth two (2) times a day. multivitamins-minerals-lutein (CENTRUM SILVER) Tab 2/26/2021  Yes Yes   Sig: Take 1 Tab by mouth daily. traZODone (DESYREL) 50 mg tablet 3/4/2021 at Unknown time  Yes Yes   Sig: Take 50 mg by mouth nightly. vitamin O-I-R-lutein-minerals (OCUVITE) tablet 2/26/2021  Yes Yes   Sig: Take 1 Tab by mouth every Monday, Wednesday, Friday. Facility-Administered Medications: None           The review of systems is:  negative for shortness of breath or chest pain      The heart, lungs, and mental status were satisfactory for the administration of anesthesia sedation and for the procedure. I discussed with the patient the objectives, risks, consequences and alternatives to the procedure. The patient was counseled at length about the risks of nain Covid-19 during their perioperative period and any recovery window from their procedure.   The patient was made aware that nain Covid-19  may worsen their prognosis for recovering from their procedure and lend to a higher morbidity and/or mortality risk. All material risks, benefits, and reasonable alternatives including postponing the procedure were discussed. The patient does  wish to proceed with the procedure at this time.         Deborah Irvin MD  3/5/2021  10:44 AM

## 2021-03-05 NOTE — PROGRESS NOTES
Beginning BP- 235/73- Dr Ry Stapleton (ANESTHESIA) Aware. Pt is relaxing now and will recheck BP shortly. Repeat /66  Pt has no c/o pain.

## 2021-03-05 NOTE — ANESTHESIA PREPROCEDURE EVALUATION
Anesthetic History   No history of anesthetic complications            Review of Systems / Medical History  Patient summary reviewed, nursing notes reviewed and pertinent labs reviewed    Pulmonary  Within defined limits                 Neuro/Psych         Headaches and psychiatric history     Cardiovascular  Within defined limits  Hypertension: poorly controlled              Exercise tolerance: >4 METS     GI/Hepatic/Renal  Within defined limits   GERD      PUD     Endo/Other  Within defined limits      Arthritis     Other Findings              Physical Exam    Airway  Mallampati: II  TM Distance: 4 - 6 cm  Neck ROM: normal range of motion   Mouth opening: Normal     Cardiovascular  Regular rate and rhythm,  S1 and S2 normal,  no murmur, click, rub, or gallop             Dental  No notable dental hx       Pulmonary  Breath sounds clear to auscultation               Abdominal  GI exam deferred       Other Findings            Anesthetic Plan    ASA: 2  Anesthesia type: total IV anesthesia and MAC          Induction: Intravenous  Anesthetic plan and risks discussed with: Patient      Propofol MAC

## 2021-03-05 NOTE — PROCEDURES
Esophagogastroduodenoscopy    Indications:  Gastric ulcer(s) for healing    Medications:  See anesthesia form    Assistants:  Chicho Mayen      Post procedure diagnosis:  Stomach noduel, 2 Superficial stomach ulcers (antrum)      Description of Procedure:    Prior to the procedure its objectives, risks, consequences and alternatives were discussed with the patient who then elected to proceed. The Olympus video endoscope was inserted under direct vision into the mouth and then into the esophagus. The esophagus looked normal.    The z-line was located at 41 cm. There were no diagnostic abnormalities of the body, fundus, cardia and incisura of the stomach. This included direct and retroflexion examination. In the mid antrum at 4:00 there was a 1.5 cm nodule. It appeared to be benign. It was firm to biopsy. In the mid antrum at 8:00 and at 6:00 there were two ulcers. The were linear, about 2cm each and superficial.  They appeared benign. The first and second portion of the duodenum appeared normal.    I took duodenal biopsies. I took biopsies of the ulcer and the stomach. I took biopsies of the nodule. Complications: There were no apparent complications and the patient tolerated the procedure well.         Implants:  none    Estimated Blood Loss:  none  Specimens Removed:  Duodenum  Stomach ulcers and stomach  Gastric antral nodule  Impressions:  Gastric nodule  Two small, superficial gastric ulcers      Signed By: Salima Chavis MD                        March 5, 2021     11:01 AM

## 2021-03-05 NOTE — ANESTHESIA POSTPROCEDURE EVALUATION
Procedure(s):  UPPER ENDOSCOPY  ESOPHAGOGASTRODUODENAL (EGD) BIOPSY.     total IV anesthesia, MAC    Anesthesia Post Evaluation      Multimodal analgesia: multimodal analgesia used between 6 hours prior to anesthesia start to PACU discharge  Patient location during evaluation: bedside  Patient participation: complete - patient participated  Level of consciousness: awake  Pain management: adequate  Airway patency: patent  Anesthetic complications: no  Cardiovascular status: acceptable  Respiratory status: acceptable  Hydration status: acceptable  Post anesthesia nausea and vomiting:  controlled  Final Post Anesthesia Temperature Assessment:  Normothermia (36.0-37.5 degrees C)      INITIAL Post-op Vital signs:   Vitals Value Taken Time   /44 03/05/21 1104   Temp     Pulse 58 03/05/21 1104   Resp 19 03/05/21 1059   SpO2 99 % 03/05/21 1104

## 2021-03-05 NOTE — DISCHARGE INSTRUCTIONS
Jami Arevalo  [id number]  1940              Procedure  Discharge Instructions:      Discomfort:  Redness at IV site- apply cold compress to area; if redness or soreness persist- contact your physician  There may be a slight amount of blood passed from the rectum  Gaseous discomfort- walking, belching will help relieve any discomfort  You may not operate a vehicle for 12 hours  You may not engage in an occupation involving machinery or appliances for rest of today  You may not drink alcoholic beverages for at least 12 hours  Avoid making any critical decisions for at least 24 hour  DIET:   You may resume your normal diet today. You should not overeat or \"feast\" today as your abdomen may become distended or uncomfortable. MEDICATIONS:   I reconciled this list from the list you gave us when you came today for the procedure. Please clarify with me, your primary care physician and the nurse who is discharging you if we have any discrepancies. {Medication reconciliation information is now added to the patient's AVS automatically when it is printed. There is no need to use this SmartLink in discharge instructions. Highlight this text and delete it to clear this message}     Aspirin and or non-steroidal medication (Ibuprofen, Motrin, naproxen, etc.) is ok in limited quantities. ACTIVITY:  You may resume your normal daily activities it is recommended that you spend the remainder of the day resting -  avoid any strenuous activity. CALL M.D. ANY SIGN OF:  Increasing pain, nausea, vomiting  Abdominal distension (swelling)  New increased bleeding (oral or rectal)  Fever (chills)  Pain in chest area  Bloody discharge from nose or mouth  Shortness of breath          Follow-up Instructions:   Call Dr. David Lanier for the results of  biopsy in approximately one week  Telephone #  [phone]  Follow up visit as previously scheduled.       Demetra Harp MD  11:05 AM  3/5/2021   I spoke to [address] [place] [address] at [phone]

## 2021-05-14 ENCOUNTER — TRANSCRIBE ORDER (OUTPATIENT)
Dept: SCHEDULING | Age: 81
End: 2021-05-14

## 2021-05-14 DIAGNOSIS — R19.07 GENERALIZED ABDOMINAL SWELLING: ICD-10-CM

## 2021-05-14 DIAGNOSIS — R14.0 BLOATING SYMPTOM: ICD-10-CM

## 2021-05-14 DIAGNOSIS — K25.9 GASTRIC ULCER: ICD-10-CM

## 2021-05-14 DIAGNOSIS — K86.2 CYST OF PANCREAS: Primary | ICD-10-CM

## 2021-06-04 ENCOUNTER — HOSPITAL ENCOUNTER (OUTPATIENT)
Dept: MRI IMAGING | Age: 81
Discharge: HOME | End: 2021-06-04
Attending: SPECIALIST
Payer: MEDICARE

## 2021-06-04 DIAGNOSIS — R14.0 BLOATING SYMPTOM: ICD-10-CM

## 2021-06-04 DIAGNOSIS — K86.2 CYST OF PANCREAS: ICD-10-CM

## 2021-06-04 DIAGNOSIS — R19.07 GENERALIZED ABDOMINAL SWELLING: ICD-10-CM

## 2021-06-04 DIAGNOSIS — K25.9 GASTRIC ULCER: ICD-10-CM

## 2021-06-04 LAB — CREAT BLD-MCNC: 0.9 MG/DL (ref 0.6–1.3)

## 2021-06-04 PROCEDURE — 74183 MRI ABD W/O CNTR FLWD CNTR: CPT

## 2021-06-04 PROCEDURE — 82565 ASSAY OF CREATININE: CPT

## 2021-06-04 PROCEDURE — 74011250636 HC RX REV CODE- 250/636: Performed by: SPECIALIST

## 2021-06-04 PROCEDURE — A9585 GADOBUTROL INJECTION: HCPCS | Performed by: SPECIALIST

## 2021-06-04 RX ADMIN — GADOBUTROL 5 ML: 604.72 INJECTION INTRAVENOUS at 11:07

## 2021-07-06 ENCOUNTER — TRANSCRIBE ORDER (OUTPATIENT)
Dept: SCHEDULING | Age: 81
End: 2021-07-06

## 2021-07-06 DIAGNOSIS — D47.2 MONOCLONAL GAMMOPATHY: Primary | ICD-10-CM

## 2021-07-13 ENCOUNTER — HOSPITAL ENCOUNTER (OUTPATIENT)
Dept: GENERAL RADIOLOGY | Age: 81
Discharge: HOME | End: 2021-07-13
Attending: INTERNAL MEDICINE
Payer: MEDICARE

## 2021-07-13 DIAGNOSIS — D47.2 MONOCLONAL GAMMOPATHY: ICD-10-CM

## 2021-07-13 PROCEDURE — 77075 RADEX OSSEOUS SURVEY COMPL: CPT

## 2021-08-18 RX ORDER — BACLOFEN 10 MG/1
10 TABLET ORAL 3 TIMES DAILY
COMMUNITY

## 2021-08-19 ENCOUNTER — ANESTHESIA EVENT (OUTPATIENT)
Dept: ENDOSCOPY | Age: 81
End: 2021-08-19
Payer: MEDICARE

## 2021-08-19 ENCOUNTER — HOSPITAL ENCOUNTER (OUTPATIENT)
Age: 81
Setting detail: OUTPATIENT SURGERY
Discharge: HOME | End: 2021-08-19
Attending: SPECIALIST | Admitting: SPECIALIST
Payer: MEDICARE

## 2021-08-19 ENCOUNTER — ANESTHESIA (OUTPATIENT)
Dept: ENDOSCOPY | Age: 81
End: 2021-08-19
Payer: MEDICARE

## 2021-08-19 VITALS
DIASTOLIC BLOOD PRESSURE: 58 MMHG | HEART RATE: 55 BPM | TEMPERATURE: 97.9 F | SYSTOLIC BLOOD PRESSURE: 166 MMHG | BODY MASS INDEX: 19.57 KG/M2 | OXYGEN SATURATION: 97 % | HEIGHT: 64 IN | RESPIRATION RATE: 18 BRPM

## 2021-08-19 PROCEDURE — 2709999900 HC NON-CHARGEABLE SUPPLY: Performed by: SPECIALIST

## 2021-08-19 PROCEDURE — 74011000250 HC RX REV CODE- 250: Performed by: ANESTHESIOLOGY

## 2021-08-19 PROCEDURE — 74011250636 HC RX REV CODE- 250/636: Performed by: ANESTHESIOLOGY

## 2021-08-19 PROCEDURE — 76060000031 HC ANESTHESIA FIRST 0.5 HR: Performed by: SPECIALIST

## 2021-08-19 PROCEDURE — 76040000019: Performed by: SPECIALIST

## 2021-08-19 PROCEDURE — 77030019988 HC FCPS ENDOSC DISP BSC -B: Performed by: SPECIALIST

## 2021-08-19 PROCEDURE — 88305 TISSUE EXAM BY PATHOLOGIST: CPT

## 2021-08-19 RX ORDER — SODIUM CHLORIDE 0.9 % (FLUSH) 0.9 %
5-40 SYRINGE (ML) INJECTION EVERY 8 HOURS
Status: DISCONTINUED | OUTPATIENT
Start: 2021-08-19 | End: 2021-08-19 | Stop reason: HOSPADM

## 2021-08-19 RX ORDER — METOPROLOL SUCCINATE 50 MG/1
50 TABLET, EXTENDED RELEASE ORAL DAILY
COMMUNITY

## 2021-08-19 RX ORDER — FLUMAZENIL 0.1 MG/ML
0.2 INJECTION INTRAVENOUS
Status: DISCONTINUED | OUTPATIENT
Start: 2021-08-19 | End: 2021-08-19 | Stop reason: HOSPADM

## 2021-08-19 RX ORDER — PROPOFOL 10 MG/ML
INJECTION, EMULSION INTRAVENOUS AS NEEDED
Status: DISCONTINUED | OUTPATIENT
Start: 2021-08-19 | End: 2021-08-19 | Stop reason: HOSPADM

## 2021-08-19 RX ORDER — DEXTROMETHORPHAN/PSEUDOEPHED 2.5-7.5/.8
1.2 DROPS ORAL
Status: DISCONTINUED | OUTPATIENT
Start: 2021-08-19 | End: 2021-08-19 | Stop reason: HOSPADM

## 2021-08-19 RX ORDER — SODIUM CHLORIDE 0.9 % (FLUSH) 0.9 %
5-40 SYRINGE (ML) INJECTION AS NEEDED
Status: DISCONTINUED | OUTPATIENT
Start: 2021-08-19 | End: 2021-08-19 | Stop reason: HOSPADM

## 2021-08-19 RX ORDER — SODIUM CHLORIDE 9 MG/ML
125 INJECTION, SOLUTION INTRAVENOUS CONTINUOUS
Status: DISCONTINUED | OUTPATIENT
Start: 2021-08-19 | End: 2021-08-19 | Stop reason: HOSPADM

## 2021-08-19 RX ORDER — NALOXONE HYDROCHLORIDE 0.4 MG/ML
0.4 INJECTION, SOLUTION INTRAMUSCULAR; INTRAVENOUS; SUBCUTANEOUS
Status: DISCONTINUED | OUTPATIENT
Start: 2021-08-19 | End: 2021-08-19 | Stop reason: HOSPADM

## 2021-08-19 RX ORDER — LIDOCAINE HYDROCHLORIDE 20 MG/ML
INJECTION, SOLUTION EPIDURAL; INFILTRATION; INTRACAUDAL; PERINEURAL AS NEEDED
Status: DISCONTINUED | OUTPATIENT
Start: 2021-08-19 | End: 2021-08-19 | Stop reason: HOSPADM

## 2021-08-19 RX ORDER — SODIUM CHLORIDE 9 MG/ML
INJECTION, SOLUTION INTRAVENOUS
Status: DISCONTINUED | OUTPATIENT
Start: 2021-08-19 | End: 2021-08-19 | Stop reason: HOSPADM

## 2021-08-19 RX ORDER — ATROPINE SULFATE 0.1 MG/ML
0.5 INJECTION INTRAVENOUS
Status: DISCONTINUED | OUTPATIENT
Start: 2021-08-19 | End: 2021-08-19 | Stop reason: HOSPADM

## 2021-08-19 RX ADMIN — PROPOFOL 170 MG: 10 INJECTION, EMULSION INTRAVENOUS at 11:47

## 2021-08-19 RX ADMIN — LIDOCAINE HYDROCHLORIDE 50 MG: 20 INJECTION, SOLUTION EPIDURAL; INFILTRATION; INTRACAUDAL; PERINEURAL at 11:38

## 2021-08-19 RX ADMIN — SODIUM CHLORIDE: 900 INJECTION, SOLUTION INTRAVENOUS at 11:30

## 2021-08-19 NOTE — ROUTINE PROCESS
[facility]  1940  [id number]    Situation:  Verbal report received from: Chanel Ely  Procedure: Procedure(s):  UPPER ESOPHAGOGASTRODUODENOSCOPY (EGD)  ESOPHAGOGASTRODUODENAL (EGD) BIOPSY    Background:    Preoperative diagnosis: PERSONAL HISTORY OF COLONIC POLYPS  GASTRIC ULCER  EPICGASTRIC PAIN  Postoperative diagnosis: Nodules in the stomach, hiatal hernia    :  Dr. Daryle Conner  Assistant(s): Endoscopy Technician-1: Sarah Archer  Endoscopy RN-1: Zabrina Jorgensen RN    Specimens:   ID Type Source Tests Collected by Time Destination   1 : Stomach and stomach nodule BX Preservative Stomach  Go Ferrer MD 8/19/2021 1143 Pathology     H. Pylori  no    Assessment:  Intra-procedure medications     Anesthesia gave intra-procedure sedation and medications, see anesthesia flow sheet yes    Intravenous fluids: NS@ KVO     Vital signs stable     Abdominal assessment: round and soft     Recommendation:  Discharge patient per MD order. Family  Permission to share finding with family or friend yes    Endoscopy discharge instructions have been reviewed and given to patient. The patient verbalized understanding and acceptance of instructions.

## 2021-08-19 NOTE — DISCHARGE INSTRUCTIONS
Aundria Runner  [id number]  1940              Procedure  Discharge Instructions:      Discomfort:  Redness at IV site- apply cold compress to area; if redness or soreness persist- contact your physician  There may be a slight amount of blood passed from the rectum  Gaseous discomfort- walking, belching will help relieve any discomfort  You may not operate a vehicle for 12 hours  You may not engage in an occupation involving machinery or appliances for rest of today  You may not drink alcoholic beverages for at least 12 hours  Avoid making any critical decisions for at least 24 hour  DIET:   You may resume your normal diet today. You should not overeat or \"feast\" today as your abdomen may become distended or uncomfortable. MEDICATIONS:   I reconciled this list from the list you gave us when you came today for the procedure. Please clarify with me, your primary care physician and the nurse who is discharging you if we have any discrepancies. Aspirin and or non-steroidal medication (Ibuprofen, Motrin, naproxen, etc.) is ok in limited quantities. ACTIVITY:  You may resume your normal daily activities it is recommended that you spend the remainder of the day resting -  avoid any strenuous activity. CALL M.D. ANY SIGN OF:  Increasing pain, nausea, vomiting  Abdominal distension (swelling)  New increased bleeding (oral or rectal)  Fever (chills)  Pain in chest area  Bloody discharge from nose or mouth  Shortness of breath      Post procedure diagnosis:  Nodules in the stomach, hiatal hernia    Follow-up Instructions:   Call Dr. Kymberly Arnold for the results of  biopsy in approximately one week  Telephone #  [phone]  Follow up visit as needed or as previously scheduled. Ryan Solorio MD  11:52 AM  8/19/2021     Patient Education        Hiatal Hernia: Care Instructions  Your Care Instructions  A hiatal hernia occurs when part of the stomach bulges into the chest cavity.   A hiatal hernia may allow stomach acid and juices to back up into the esophagus (acid reflux). This can cause a feeling of burning, warmth, heat, or pain behind the breastbone. This feeling may often occur after you eat, soon after you lie down, or when you bend forward, and it may come and go. You also may have a sour taste in your mouth. These symptoms are commonly known as heartburn or reflux. But not all hiatal hernias cause symptoms. Follow-up care is a key part of your treatment and safety. Be sure to make and go to all appointments, and call your doctor if you are having problems. It's also a good idea to know your test results and keep a list of the medicines you take. How can you care for yourself at home? · Take your medicines exactly as prescribed. Call your doctor if you think you are having a problem with your medicine. · Do not take aspirin or other nonsteroidal anti-inflammatory drugs (NSAIDs), such as ibuprofen (Advil, Motrin) or naproxen (Aleve), unless your doctor says it is okay. Ask your doctor what you can take for pain. · Your doctor may recommend over-the-counter medicine. For mild or occasional indigestion, antacids such as Tums, Gaviscon, Maalox, or Mylanta may help. Your doctor also may recommend over-the-counter acid reducers, such as famotidine (Pepcid AC), cimetidine (Tagamet HB), or omeprazole (Prilosec). Read and follow all instructions on the label. If you use these medicines often, talk with your doctor. · Change your eating habits. ? It's best to eat several small meals instead of two or three large meals. ? After you eat, wait 2 to 3 hours before you lie down. Late-night snacks aren't a good idea. ? Chocolate, mint, and alcohol can make heartburn worse. They relax the valve between the esophagus and the stomach. ? Spicy foods, foods that have a lot of acid (like tomatoes and oranges), and coffee can make heartburn symptoms worse in some people.  If your symptoms are worse after you eat a certain food, you may want to stop eating that food to see if your symptoms get better. · Do not smoke or chew tobacco.  · If you get heartburn at night, raise the head of your bed 6 to 8 inches by putting the frame on blocks or placing a foam wedge under the head of your mattress. (Adding extra pillows does not work.)  · Do not wear tight clothing around your middle. · Lose weight if you need to. Losing just 5 to 10 pounds can help. When should you call for help? Call your doctor now or seek immediate medical care if:    · You have new or worse belly pain.     · You are vomiting. Watch closely for changes in your health, and be sure to contact your doctor if:    · You have new or worse symptoms of indigestion.     · You have trouble or pain swallowing.     · You are losing weight.     · You do not get better as expected. Where can you learn more? Go to http://www.moreno.com/  Enter T074 in the search box to learn more about \"Hiatal Hernia: Care Instructions. \"  Current as of: April 15, 2020               Content Version: 12.8  © 5259-8446 [employer]. Care instructions adapted under license by [employer] (which disclaims liability or warranty for this information). If you have questions about a medical condition or this instruction, always ask your healthcare professional. [address] any warranty or liability for your use of this information.

## 2021-08-19 NOTE — PROGRESS NOTES
Endoscope was pre-cleaned at the bedside immediately following procedure by [facility] ET  Medications     lidocaine (PF) 2% (mg)     Date/Time   Rate/Dose/Volume Action Route Admin User Audit   08/19/21  1138  50 mg Given IntraVENous Deborah Shah CRNA              propofol 10 mg/mL (mg)     Date/Time   Rate/Dose/Volume Action Route Admin User Audit   08/19/21  1147  170 mg Given IntraVENous Deborah Shah CRNA              NS (mL)     Date/Time   Rate/Dose/Volume Action Route Admin User Audit   08/19/21  1130   New Bag IntraVENous Randi Roman, Professor [address] [place] [address], CRNA                  .

## 2021-08-19 NOTE — ANESTHESIA POSTPROCEDURE EVALUATION
Procedure(s):  UPPER ESOPHAGOGASTRODUODENOSCOPY (EGD)  ESOPHAGOGASTRODUODENAL (EGD) BIOPSY. total IV anesthesia, general    Anesthesia Post Evaluation        Patient location during evaluation: PACU  Note status: Adequate. Level of consciousness: responsive to verbal stimuli and sleepy but conscious  Pain management: satisfactory to patient  Airway patency: patent  Anesthetic complications: no  Cardiovascular status: acceptable  Respiratory status: acceptable  Hydration status: acceptable  Comments: +Post-Anesthesia Evaluation and Assessment    Patient: Yenifer Delvalle MRN: [id number]  SSN: [id number]   YOB: 1940  Age: 80 y.o. Sex: female      Cardiovascular Function/Vital Signs    BP (!) 166/58   Pulse (!) 55   Temp 36.6 °C (97.9 °F)   Resp 18   Ht 5' 4\" (1.626 m)   SpO2 97%   BMI 19.57 kg/m²     Patient is status post Procedure(s):  UPPER ESOPHAGOGASTRODUODENOSCOPY (EGD)  ESOPHAGOGASTRODUODENAL (EGD) BIOPSY. Nausea/Vomiting: Controlled. Postoperative hydration reviewed and adequate. Pain:  Pain Scale 1: Numeric (0 - 10) (08/19/21 1212)  Pain Intensity 1: 0 (08/19/21 1212)   Managed. Neurological Status: At baseline. Mental Status and Level of Consciousness: Arousable. Pulmonary Status:   O2 Device: None (Room air) (08/19/21 1212)   Adequate oxygenation and airway patent. Complications related to anesthesia: None    Post-anesthesia assessment completed. No concerns. Signed By: Destiney Mota MD    8/19/2021  Post anesthesia nausea and vomiting:  controlled      INITIAL Post-op Vital signs:   Vitals Value Taken Time   /65 08/19/21 1215   Temp 36.6 °C (97.9 °F) 08/19/21 1157   Pulse 56 08/19/21 1215   Resp 16 08/19/21 1215   SpO2 97 % 08/19/21 1215   Vitals shown include unvalidated device data.

## 2021-08-19 NOTE — ANESTHESIA PREPROCEDURE EVALUATION
Anesthetic History   No history of anesthetic complications            Review of Systems / Medical History  Patient summary reviewed, nursing notes reviewed and pertinent labs reviewed    Pulmonary  Within defined limits                 Neuro/Psych         Headaches and psychiatric history     Cardiovascular  Within defined limits  Hypertension: poorly controlled              Exercise tolerance: >4 METS     GI/Hepatic/Renal     GERD      PUD and liver disease     Endo/Other  Within defined limits      Arthritis     Other Findings              Physical Exam    Airway  Mallampati: II  TM Distance: 4 - 6 cm  Neck ROM: normal range of motion   Mouth opening: Normal     Cardiovascular  Regular rate and rhythm,  S1 and S2 normal,  no murmur, click, rub, or gallop             Dental  No notable dental hx       Pulmonary  Breath sounds clear to auscultation               Abdominal  GI exam deferred       Other Findings            Anesthetic Plan    ASA: 2  Anesthesia type: total IV anesthesia and general          Induction: Intravenous  Anesthetic plan and risks discussed with: Patient      Propofol MAC

## 2021-08-19 NOTE — H&P
Pre-endoscopy H and P    The patient was seen and examined in the endoscopy suite. The airway was assessed and docuemented. The problem list, past medical history, and medications were reviewed. The history is:  was about stoamchulcer, concers about liver and she additionally had fatigue. egd 10.2020 showed small gu sucralfate was added bx: 1. Duodenum, biopsy:Normal small intestinal mucosa2. Gastric ulcers, biopsy:Benign antral mucosa with reactive gastropathy/foveolar hypertrophyNo evidence of gastritis or ulcer    no celiac disease changes this time. EUs at that time: Ultrasound:Esophagus: normal findingsStomach: antral wall is thickened(mucosa and submucosa) from 5- 8 mm. Muscularis propria is normal.EUS FNB x 3 done with a 22 gauge needlePancreas:Areas examined: the head, the genu, the body, the tailParenchyma: -Appeared normal on radial EUS but on linear a cystic area isnoted in proximity of head /? Renal cyst. I did not do an FNA as a resultLiver:Parenchyma: normalGallbladder: normalBile Duct: the common bile duct is normal appearingLymph Node: no adenopathy     repeat egd 3.5.2021  bx: FINAL PATHOLOGIC DIAGNOSIS<<<<<1. Duodenum, biopsy:Normal small intestinal mucosa2. Gastric ulcers, biopsy:Benign antral mucosa with reactive gastropathy/foveolar hypertrophyNo evidence of gastritis or ulcer      she is gaining weight. she eats a lot of fruit. She has concerns about bloating and belly fat. Current gi symptoms more fruit makes her gassy. There is some discomofort in the stomah that is sporadic. Can be a sharp quick stabl like feeling. she has had a little hb. Sometimes the bowels are a little sluggish then she will have \"a lot\". with repated bowel movmemtns. no diarrhea.      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 Never took xifaxin due to concerns about drug. I am ok with that.     regarding the heterogeneous liver we will do mrcp and blood test I note us normal not worrisome. blood tests normal 09.2020    regarding fecal calprotectin 123 colon bx and ileal bx negative no nsaid drugs currently. (cut out excedrin). she wants to get off ppi. I cant endorse stopping ppi until ulcer are healed. and they may not heal      Patient Active Problem List   Diagnosis Code    Family history of colon cancer requiring screening colonoscopy Z80.0    Dyspepsia R10.13    Diarrhea B61.0    Periumbilical pain R91.52    Abnormal histology findings R89.7    History of gastric ulcer Z87.11     Social History     Socioeconomic History    Marital status:      Spouse name: Not on file    Number of children: Not on file    Years of education: Not on file    Highest education level: Not on file   Occupational History    Not on file   Tobacco Use    Smoking status: Never Smoker    Smokeless tobacco: Never Used   Vaping Use    Vaping Use: Never used   Substance and Sexual Activity    Alcohol use: Not Currently    Drug use: No    Sexual activity: Not on file   Other Topics Concern    Not on file   Social History Narrative    Not on file     Social Determinants of Health     Financial Resource Strain:     Difficulty of Paying Living Expenses:    Food Insecurity:     Worried About Running Out of Food in the Last Year:     [address] in the Last Year:    Transportation Needs:     Lack of Transportation (Medical):      Lack of Transportation (Non-Medical):    Physical Activity:     Days of Exercise per Week:     Minutes of Exercise per Session:    Stress:     Feeling of Stress :    Social Connections:     Frequency of Communication with Friends and Family:     Frequency of Social Gatherings with Friends and Family:     Attends Holiness Services:     Active Member of Clubs or Organizations:     Attends Club or Organization Meetings:  Marital Status:    Intimate Partner Violence:     Fear of Current or Ex-Partner:     Emotionally Abused:     Physically Abused:     Sexually Abused:      Past Medical History:   Diagnosis Date    Abnormal histology findings 5/28/2020    ? Celiac disease 2019    Arthritis     Chronic pain     arthritis/chronic headache daily    Diarrhea 2/28/2019    Family history of colon cancer requiring screening colonoscopy 1/29/2013    GERD (gastroesophageal reflux disease)     High cholesterol     History of gastric ulcer 7/23/2020    Hypertension     Non-alcoholic fatty liver disease 5378    Periumbilical pain 6/30/0577    Psychiatric disorder     ANXIETY AND DEPRESSION    PUD (peptic ulcer disease)      The patient has a family history of na    Medications Prior to Admission   Medication Sig    metoprolol succinate (TOPROL-XL) 50 mg XL tablet Take 50 mg by mouth daily.  baclofen (LIORESAL) 10 mg tablet Take 10 mg by mouth three (3) times daily.  traZODone (DESYREL) 50 mg tablet Take 50 mg by mouth nightly.  esomeprazole (NexIUM) 40 mg capsule Take 40 mg by mouth two (2) times a day.  vitamin B-K-N-lutein-minerals (OCUVITE) tablet Take 1 Tab by mouth every Monday, Wednesday, Friday.  docosahexanoic acid/epa (FISH OIL PO) Take 1 Cap by mouth daily.  multivitamins-minerals-lutein (CENTRUM SILVER) Tab Take 1 Tab by mouth daily.  CALCIUM CARBONATE/VITAMIN D3 (CALCIUM + D PO) Take 1 Tab by mouth daily. The review of systems is:  negative for shortness of breath or chest pain      The heart, lungs, and mental status were satisfactory for the administration of anesthesia sedation and for the procedure. I discussed with the patient the objectives, risks, consequences and alternatives to the procedure. The patient was counseled at length about the risks of nain Covid-19 during their perioperative period and any recovery window from their procedure.   The patient was made aware that nain Covid-19  may worsen their prognosis for recovering from their procedure and lend to a higher morbidity and/or mortality risk. All material risks, benefits, and reasonable alternatives including postponing the procedure were discussed. The patient does  wish to proceed with the procedure at this time.         Sarah Mcgregor MD  8/19/2021  9:36 AM

## 2021-08-19 NOTE — PROCEDURES
Esophagogastroduodenoscopy    Indications:  Gastric ulcer for follow up    Medications:  See anesthesia form    Assistants:    Guille Pelaez      Post procedure diagnosis:  Nodules in the stomach, hiatal hernia    Description of Procedure:    Prior to the procedure its objectives, risks, consequences and alternatives were discussed with the patient who then elected to proceed. The Olympus video endoscope was inserted under direct vision into the mouth and then into the esophagus. The esophagus looked normal.    The z-line was located at 39 cm. A small hiatal hernia was seen with the diaphragm pinch at 40 cm. There were no diagnostic mucosal abnormalities of the body, fundus, antrum, cardia and incisura of the stomach. This included direct and retroflexion examination. There were nodules in the stomach underneath normal mucsa (3 x 1 cm). I took biopsies of the nodules and the stomach. The first and second portion of the duodenum appeared normal.          Complications: There were no apparent complications and the patient tolerated the procedure well.         Implants:  none    Estimated Blood Loss:  none  Specimens Removed:  Stomach and stomach nodules  Impressions:  No ulcers  Gastric nodules in antrum (patient has had eus ni the past)      Signed By: Brinda Garcia MD                        August 19, 2021     11:49 AM

## 2021-12-15 ENCOUNTER — TRANSCRIBE ORDER (OUTPATIENT)
Dept: SCHEDULING | Age: 81
End: 2021-12-15

## 2021-12-15 DIAGNOSIS — Z79.899 ENCOUNTER FOR LONG-TERM (CURRENT) USE OF OTHER MEDICATIONS: ICD-10-CM

## 2021-12-15 DIAGNOSIS — K25.9 ESOPHAGOGASTRIC ULCER: Primary | ICD-10-CM

## 2022-03-10 ENCOUNTER — HOSPITAL ENCOUNTER (OUTPATIENT)
Dept: MRI IMAGING | Age: 82
Discharge: HOME | End: 2022-03-10
Attending: SPECIALIST
Payer: MEDICARE

## 2022-03-10 DIAGNOSIS — Z79.899 ENCOUNTER FOR LONG-TERM (CURRENT) USE OF OTHER MEDICATIONS: ICD-10-CM

## 2022-03-10 DIAGNOSIS — K25.9 ESOPHAGOGASTRIC ULCER: ICD-10-CM

## 2022-03-10 PROCEDURE — 74011250636 HC RX REV CODE- 250/636: Performed by: SPECIALIST

## 2022-03-10 PROCEDURE — 74183 MRI ABD W/O CNTR FLWD CNTR: CPT

## 2022-03-10 PROCEDURE — A9575 INJ GADOTERATE MEGLUMI 0.1ML: HCPCS | Performed by: SPECIALIST

## 2022-03-10 RX ORDER — GADOTERATE MEGLUMINE 376.9 MG/ML
10 INJECTION INTRAVENOUS
Status: COMPLETED | OUTPATIENT
Start: 2022-03-10 | End: 2022-03-10

## 2022-03-10 RX ADMIN — GADOTERATE MEGLUMINE 10 ML: 376.9 INJECTION INTRAVENOUS at 10:52

## 2022-03-18 PROBLEM — Z87.11 HISTORY OF GASTRIC ULCER: Status: ACTIVE | Noted: 2020-07-23

## 2022-03-18 PROBLEM — R89.7 ABNORMAL HISTOLOGY FINDINGS: Status: ACTIVE | Noted: 2020-05-28

## 2022-03-18 PROBLEM — R19.7 DIARRHEA: Status: ACTIVE | Noted: 2019-02-28

## 2022-03-19 PROBLEM — R10.33 PERIUMBILICAL PAIN: Status: ACTIVE | Noted: 2020-05-28

## 2022-03-24 ENCOUNTER — TRANSCRIBE ORDER (OUTPATIENT)
Dept: SCHEDULING | Age: 82
End: 2022-03-24

## 2022-03-25 ENCOUNTER — TRANSCRIBE ORDER (OUTPATIENT)
Dept: SCHEDULING | Age: 82
End: 2022-03-25

## 2022-03-25 DIAGNOSIS — R06.02 SHORTNESS OF BREATH: Primary | ICD-10-CM

## 2022-03-28 ENCOUNTER — TRANSCRIBE ORDER (OUTPATIENT)
Dept: SCHEDULING | Age: 82
End: 2022-03-28

## 2022-03-28 DIAGNOSIS — K86.2 CYST OF PANCREAS: ICD-10-CM

## 2022-03-28 DIAGNOSIS — K74.60 CIRRHOSIS OF LIVER NOT DUE TO ALCOHOL (HCC): Primary | ICD-10-CM

## 2022-04-12 ENCOUNTER — HOSPITAL ENCOUNTER (OUTPATIENT)
Dept: INTERVENTIONAL RADIOLOGY/VASCULAR | Age: 82
Discharge: HOME | End: 2022-04-12
Attending: PHYSICIAN ASSISTANT
Payer: MEDICARE

## 2022-04-12 VITALS
TEMPERATURE: 98.3 F | RESPIRATION RATE: 17 BRPM | HEIGHT: 64 IN | OXYGEN SATURATION: 100 % | WEIGHT: 114 LBS | DIASTOLIC BLOOD PRESSURE: 60 MMHG | HEART RATE: 64 BPM | SYSTOLIC BLOOD PRESSURE: 170 MMHG | BODY MASS INDEX: 19.46 KG/M2

## 2022-04-12 DIAGNOSIS — K86.2 CYST OF PANCREAS: ICD-10-CM

## 2022-04-12 DIAGNOSIS — K74.60 CIRRHOSIS OF LIVER NOT DUE TO ALCOHOL (HCC): ICD-10-CM

## 2022-04-12 PROCEDURE — C1769 GUIDE WIRE: HCPCS

## 2022-04-12 PROCEDURE — 2709999900 HC NON-CHARGEABLE SUPPLY

## 2022-04-12 PROCEDURE — 75970 VASCULAR BIOPSY: CPT

## 2022-04-12 PROCEDURE — 74011250637 HC RX REV CODE- 250/637: Performed by: RADIOLOGY

## 2022-04-12 PROCEDURE — 77030009378 HC DEV TORQ OLCT F/GWIRE MANIP COOK -A

## 2022-04-12 PROCEDURE — 77030011229 HC DIL VESL COON COOK -A

## 2022-04-12 PROCEDURE — 77030014109 HC TRNSDUC SP PROC MRTM -B

## 2022-04-12 PROCEDURE — 74011250636 HC RX REV CODE- 250/636: Performed by: RADIOLOGY

## 2022-04-12 PROCEDURE — 36011 PLACE CATHETER IN VEIN: CPT

## 2022-04-12 PROCEDURE — C1892 INTRO/SHEATH,FIXED,PEEL-AWAY: HCPCS

## 2022-04-12 PROCEDURE — C1894 INTRO/SHEATH, NON-LASER: HCPCS

## 2022-04-12 PROCEDURE — 88307 TISSUE EXAM BY PATHOLOGIST: CPT

## 2022-04-12 PROCEDURE — 74011000250 HC RX REV CODE- 250: Performed by: RADIOLOGY

## 2022-04-12 PROCEDURE — 88313 SPECIAL STAINS GROUP 2: CPT

## 2022-04-12 PROCEDURE — 74011000636 HC RX REV CODE- 636: Performed by: RADIOLOGY

## 2022-04-12 RX ORDER — MIDAZOLAM HYDROCHLORIDE 1 MG/ML
5 INJECTION INTRAMUSCULAR; INTRAVENOUS
Status: DISCONTINUED | OUTPATIENT
Start: 2022-04-12 | End: 2022-04-16 | Stop reason: HOSPADM

## 2022-04-12 RX ORDER — SODIUM CHLORIDE 9 MG/ML
25 INJECTION, SOLUTION INTRAVENOUS CONTINUOUS
Status: DISCONTINUED | OUTPATIENT
Start: 2022-04-12 | End: 2022-04-16 | Stop reason: HOSPADM

## 2022-04-12 RX ORDER — LIDOCAINE HYDROCHLORIDE 20 MG/ML
10 INJECTION, SOLUTION INFILTRATION; PERINEURAL ONCE
Status: DISPENSED | OUTPATIENT
Start: 2022-04-12 | End: 2022-04-12

## 2022-04-12 RX ORDER — LIDOCAINE HYDROCHLORIDE 20 MG/ML
10 INJECTION, SOLUTION INFILTRATION; PERINEURAL ONCE
Status: COMPLETED | OUTPATIENT
Start: 2022-04-12 | End: 2022-04-12

## 2022-04-12 RX ORDER — FENTANYL CITRATE 50 UG/ML
100 INJECTION, SOLUTION INTRAMUSCULAR; INTRAVENOUS
Status: DISCONTINUED | OUTPATIENT
Start: 2022-04-12 | End: 2022-04-16 | Stop reason: HOSPADM

## 2022-04-12 RX ORDER — ACETAMINOPHEN 500 MG
1000 TABLET ORAL
Status: COMPLETED | OUTPATIENT
Start: 2022-04-12 | End: 2022-04-12

## 2022-04-12 RX ORDER — HEPARIN SODIUM 200 [USP'U]/100ML
400 INJECTION, SOLUTION INTRAVENOUS ONCE
Status: COMPLETED | OUTPATIENT
Start: 2022-04-12 | End: 2022-04-12

## 2022-04-12 RX ADMIN — LIDOCAINE HYDROCHLORIDE 4 ML: 20 INJECTION, SOLUTION INFILTRATION; PERINEURAL at 11:16

## 2022-04-12 RX ADMIN — MIDAZOLAM HYDROCHLORIDE 2 MG: 1 INJECTION, SOLUTION INTRAMUSCULAR; INTRAVENOUS at 10:50

## 2022-04-12 RX ADMIN — ACETAMINOPHEN 1000 MG: 500 TABLET ORAL at 12:57

## 2022-04-12 RX ADMIN — SODIUM CHLORIDE 25 ML/HR: 9 INJECTION, SOLUTION INTRAVENOUS at 10:14

## 2022-04-12 RX ADMIN — FENTANYL CITRATE 25 MCG: 50 INJECTION, SOLUTION INTRAMUSCULAR; INTRAVENOUS at 10:53

## 2022-04-12 RX ADMIN — IOPAMIDOL 25 ML: 755 INJECTION, SOLUTION INTRAVENOUS at 11:17

## 2022-04-12 RX ADMIN — HEPARIN SODIUM IN SODIUM CHLORIDE 60 ML: 200 INJECTION INTRAVENOUS at 11:17

## 2022-04-12 NOTE — H&P
Interventional and Vascular Radiology History and Physical    Patient: Thomas Mariee 80 y.o. female       Chief Complaint: No chief complaint on file. History of Present Illness: evaluate for liver disease     History:    Past Medical History:   Diagnosis Date    Abnormal histology findings 5/28/2020    ? Celiac disease 2019    Arthritis     Chronic pain     arthritis/chronic headache daily    Diarrhea 2/28/2019    Family history of colon cancer requiring screening colonoscopy 1/29/2013    GERD (gastroesophageal reflux disease)     High cholesterol     History of gastric ulcer 7/23/2020    Hypertension     Non-alcoholic fatty liver disease 2523    Periumbilical pain 3/64/9962    Psychiatric disorder     ANXIETY AND DEPRESSION    PUD (peptic ulcer disease)      Family History   Problem Relation Age of Onset    Cancer Mother         colon    COPD Mother     Heart Disease Father     Other Father         arthritis     Social History     Socioeconomic History    Marital status:      Spouse name: Not on file    Number of children: Not on file    Years of education: Not on file    Highest education level: Not on file   Occupational History    Not on file   Tobacco Use    Smoking status: Never Smoker    Smokeless tobacco: Never Used   Vaping Use    Vaping Use: Never used   Substance and Sexual Activity    Alcohol use: Not Currently    Drug use: No    Sexual activity: Not on file   Other Topics Concern    Not on file   Social History Narrative    Not on file     Social Determinants of Health     Financial Resource Strain:     Difficulty of Paying Living Expenses: Not on file   Food Insecurity:     Worried About Running Out of Food in the Last Year: Not on file    Bigg of Food in the Last Year: Not on file   Transportation Needs:     Lack of Transportation (Medical): Not on file    Lack of Transportation (Non-Medical):  Not on file   Physical Activity:     Days of Exercise per Week: Not on file    Minutes of Exercise per Session: Not on file   Stress:     Feeling of Stress : Not on file   Social Connections:     Frequency of Communication with Friends and Family: Not on file    Frequency of Social Gatherings with Friends and Family: Not on file    Attends Sabianist Services: Not on file    Active Member of Clubs or Organizations: Not on file    Attends Club or Organization Meetings: Not on file    Marital Status: Not on file   Intimate Partner Violence:     Fear of Current or Ex-Partner: Not on file    Emotionally Abused: Not on file    Physically Abused: Not on file    Sexually Abused: Not on file   Housing Stability:     Unable to Pay for Housing in the Last Year: Not on file    Number of [place] in the Last Year: Not on file    Unstable Housing in the Last Year: Not on file       Allergies: Allergies   Allergen Reactions    Doxycycline Other (comments)     Difficulty swallowing    Diclofenac Cough    Sulfa (Sulfonamide Antibiotics) Unknown (comments)     Unknown reaction in past    Fiorinal-Codeine #3 [Codeine-Butalbital-Asa-Caff] Rash       Current Medications:  Current Outpatient Medications   Medication Sig    metoprolol succinate (TOPROL-XL) 50 mg XL tablet Take 50 mg by mouth daily.  baclofen (LIORESAL) 10 mg tablet Take 10 mg by mouth three (3) times daily.  traZODone (DESYREL) 50 mg tablet Take 50 mg by mouth nightly.  esomeprazole (NexIUM) 40 mg capsule Take 40 mg by mouth two (2) times a day.  vitamin K-F-D-lutein-minerals (OCUVITE) tablet Take 1 Tab by mouth every Monday, Wednesday, Friday.  docosahexanoic acid/epa (FISH OIL PO) Take 1 Cap by mouth daily.  multivitamins-minerals-lutein (CENTRUM SILVER) Tab Take 1 Tab by mouth daily.  CALCIUM CARBONATE/VITAMIN D3 (CALCIUM + D PO) Take 1 Tab by mouth daily.      Current Facility-Administered Medications   Medication Dose Route Frequency    fentaNYL citrate (PF) injection 100 mcg 100 mcg IntraVENous Multiple    0.9% sodium chloride infusion  25 mL/hr IntraVENous CONTINUOUS    0.9% sodium chloride infusion  25 mL/hr IntraVENous CONTINUOUS    midazolam (PF) (VERSED) 1 mg/mL injection 5 mg  5 mg IntraVENous Rad Multiple    lidocaine (XYLOCAINE) 20 mg/mL (2 %) injection 200 mg  10 mL SubCUTAneous ONCE        Physical Exam:  Blood pressure (!) 172/85, pulse 64, temperature 98.3 °F (36.8 °C), resp. rate 16, height 5' 4\" (1.626 m), weight 51.7 kg (114 lb), SpO2 100 %, not currently breastfeeding. LUNG: clear to auscultation bilaterally, HEART: regular rate and rhythm, S1, S2 normal, no murmur, click, rub or gallop      Alerts:    Hospital Problems  Date Reviewed: 8/19/2021    None          Laboratory:    No results for input(s): HGB, HCT, WBC, PLT, INR, BUN, CREA, K, CRCLT, HGBEXT, HCTEXT, PLTEXT, INREXT in the last 72 hours. No lab exists for component: PTT, PT      Plan of Care/Planned Procedure:  Risks, benefits, and alternatives reviewed with patient and she agrees to proceed with the procedure. Conscious sedation will be performed with IV fentanyl and versed.  Plan is for [facility] liver biopsy and pressure measurements       Socorro Enrique MD

## 2022-04-12 NOTE — PROGRESS NOTES
Pt sitting up in bed drinking apple juice. Pt denies nausea or abdominal pain. Meal tray ordered for patient.

## 2022-04-12 NOTE — PROGRESS NOTES
Patient resting comfortably in bed with eyes closed. Pt denies any abdominal pain. Dressing remains clean and dry.

## 2022-04-12 NOTE — PROGRESS NOTES
Name of procedure: Transjugular Liver BX    Sedation medications given: Yes    Versed: 2 mg    Fentanyl: 25 mcg    Sedation tolerated: Well    Total Sedation time: 20 minutes    Sedation start:  1050  Sedation end:  1110    Vital Signs: Stable    Fluids removed: None    Samples sent to lab: Liver samples in lab    Any complications related to procedure: None    Post Procedure Care Needed/order sets placed in [facility].

## 2022-04-12 NOTE — DISCHARGE INSTRUCTIONS
[facility]  Radiology Department  [phone]      Radiologist: Severo Valentin    Date: 04/12/2022      Transjugular Liver Biopsy Discharge Instructions    Go home and rest today. Restrict your activity. Do not lift anything heavier than a small grocery bag (10 pounds) for the next 5 days. If you become sweaty, develop severe abdominal pain, dizziness - have someone drive you to the nearest Emergency Room. However, mild pain under the left collarbone is normal.     You have been given sedating medications, so do not drive or make any important decisions. Resume your previous diet and prescribed medications. If you have aching pain in your abdomin, You may take Tylenol if allowed, as directed on the label, for pain or discomfort. Avoid Ibuprofen (Advil, Motrin etc.) and Aspirin today as they may increase your risk of bleeding. You may shower in 24 hours, washing with soap and water. Dry area well and keep it covered with a band aid. Do not soak or swim until the procedure site has healed completely. Watch your puncture site for any bleeding or signs of infection. Follow up with your physician as previously discussed to receive you results. If you have any questions regarding your procedure please call and ask to speak to a radiology nurse.

## 2022-04-15 ENCOUNTER — HOSPITAL ENCOUNTER (EMERGENCY)
Dept: INTERVENTIONAL RADIOLOGY/VASCULAR | Age: 82
Discharge: HOME | DRG: 907 | End: 2022-04-15
Attending: EMERGENCY MEDICINE
Payer: MEDICARE

## 2022-04-15 ENCOUNTER — APPOINTMENT (OUTPATIENT)
Dept: CT IMAGING | Age: 82
DRG: 907 | End: 2022-04-15
Attending: EMERGENCY MEDICINE
Payer: MEDICARE

## 2022-04-15 ENCOUNTER — ANESTHESIA EVENT (OUTPATIENT)
Dept: INTERVENTIONAL RADIOLOGY/VASCULAR | Age: 82
DRG: 907 | End: 2022-04-15
Payer: MEDICARE

## 2022-04-15 ENCOUNTER — HOSPITAL ENCOUNTER (INPATIENT)
Age: 82
LOS: 11 days | Discharge: HOME | DRG: 907 | End: 2022-04-26
Attending: EMERGENCY MEDICINE | Admitting: EMERGENCY MEDICINE
Payer: MEDICARE

## 2022-04-15 ENCOUNTER — ANESTHESIA (OUTPATIENT)
Dept: INTERVENTIONAL RADIOLOGY/VASCULAR | Age: 82
DRG: 907 | End: 2022-04-15
Payer: MEDICARE

## 2022-04-15 VITALS
RESPIRATION RATE: 14 BRPM | SYSTOLIC BLOOD PRESSURE: 115 MMHG | TEMPERATURE: 98.5 F | OXYGEN SATURATION: 100 % | HEART RATE: 62 BPM | DIASTOLIC BLOOD PRESSURE: 63 MMHG

## 2022-04-15 DIAGNOSIS — I95.89 HYPOTENSION DUE TO HYPOVOLEMIA: Primary | ICD-10-CM

## 2022-04-15 DIAGNOSIS — E86.1 HYPOTENSION DUE TO HYPOVOLEMIA: Primary | ICD-10-CM

## 2022-04-15 PROBLEM — K76.89 LIVER HEMORRHAGE: Status: ACTIVE | Noted: 2022-04-15

## 2022-04-15 LAB
ALBUMIN SERPL-MCNC: 2.7 G/DL (ref 3.5–5)
ALBUMIN/GLOB SERPL: 1 {RATIO} (ref 1.1–2.2)
ALP SERPL-CCNC: 69 U/L (ref 45–117)
ALT SERPL-CCNC: 189 U/L (ref 12–78)
ANION GAP SERPL CALC-SCNC: 5 MMOL/L (ref 5–15)
ANION GAP SERPL CALC-SCNC: 7 MMOL/L (ref 5–15)
AST SERPL-CCNC: 193 U/L (ref 15–37)
ATRIAL RATE: 44 BPM
BASOPHILS # BLD: 0.1 K/UL (ref 0–0.1)
BASOPHILS NFR BLD: 1 % (ref 0–1)
BILIRUB SERPL-MCNC: 0.5 MG/DL (ref 0.2–1)
BUN SERPL-MCNC: 14 MG/DL (ref 6–20)
BUN SERPL-MCNC: 15 MG/DL (ref 6–20)
BUN/CREAT SERPL: 20 (ref 12–20)
BUN/CREAT SERPL: 21 (ref 12–20)
CALCIUM SERPL-MCNC: 7.5 MG/DL (ref 8.5–10.1)
CALCIUM SERPL-MCNC: 8.1 MG/DL (ref 8.5–10.1)
CALCULATED P AXIS, ECG09: 82 DEGREES
CALCULATED R AXIS, ECG10: 82 DEGREES
CALCULATED T AXIS, ECG11: 80 DEGREES
CHLORIDE SERPL-SCNC: 103 MMOL/L (ref 97–108)
CHLORIDE SERPL-SCNC: 107 MMOL/L (ref 97–108)
CO2 SERPL-SCNC: 24 MMOL/L (ref 21–32)
CO2 SERPL-SCNC: 30 MMOL/L (ref 21–32)
COMMENT, HOLDF: NORMAL
CREAT SERPL-MCNC: 0.67 MG/DL (ref 0.55–1.02)
CREAT SERPL-MCNC: 0.74 MG/DL (ref 0.55–1.02)
DIAGNOSIS, 93000: NORMAL
DIFFERENTIAL METHOD BLD: ABNORMAL
EOSINOPHIL # BLD: 0.1 K/UL (ref 0–0.4)
EOSINOPHIL NFR BLD: 1 % (ref 0–7)
ERYTHROCYTE [DISTWIDTH] IN BLOOD BY AUTOMATED COUNT: 12.1 % (ref 11.5–14.5)
ERYTHROCYTE [DISTWIDTH] IN BLOOD BY AUTOMATED COUNT: 12.1 % (ref 11.5–14.5)
ERYTHROCYTE [DISTWIDTH] IN BLOOD BY AUTOMATED COUNT: 12.8 % (ref 11.5–14.5)
FIBRINOGEN PPP-MCNC: 187 MG/DL (ref 200–475)
GLOBULIN SER CALC-MCNC: 2.7 G/DL (ref 2–4)
GLUCOSE SERPL-MCNC: 136 MG/DL (ref 65–100)
GLUCOSE SERPL-MCNC: 148 MG/DL (ref 65–100)
HCT VFR BLD AUTO: 21.9 % (ref 35–47)
HCT VFR BLD AUTO: 25.4 % (ref 35–47)
HCT VFR BLD AUTO: 31 % (ref 35–47)
HGB BLD-MCNC: 10 G/DL (ref 11.5–16)
HGB BLD-MCNC: 7 G/DL (ref 11.5–16)
HGB BLD-MCNC: 8.4 G/DL (ref 11.5–16)
HISTORY CHECKED?,CKHIST: NORMAL
IMM GRANULOCYTES # BLD AUTO: 0.1 K/UL (ref 0–0.04)
IMM GRANULOCYTES NFR BLD AUTO: 1 % (ref 0–0.5)
INR PPP: 1.3 (ref 0.9–1.1)
LACTATE SERPL-SCNC: 1.7 MMOL/L (ref 0.4–2)
LACTATE SERPL-SCNC: 2.3 MMOL/L (ref 0.4–2)
LIPASE SERPL-CCNC: 107 U/L (ref 73–393)
LYMPHOCYTES # BLD: 1.5 K/UL (ref 0.8–3.5)
LYMPHOCYTES NFR BLD: 15 % (ref 12–49)
MAGNESIUM SERPL-MCNC: 2 MG/DL (ref 1.6–2.4)
MCH RBC QN AUTO: 29.2 PG (ref 26–34)
MCH RBC QN AUTO: 29.4 PG (ref 26–34)
MCH RBC QN AUTO: 29.5 PG (ref 26–34)
MCHC RBC AUTO-ENTMCNC: 32 G/DL (ref 30–36.5)
MCHC RBC AUTO-ENTMCNC: 32.3 G/DL (ref 30–36.5)
MCHC RBC AUTO-ENTMCNC: 33.1 G/DL (ref 30–36.5)
MCV RBC AUTO: 89.1 FL (ref 80–99)
MCV RBC AUTO: 90.4 FL (ref 80–99)
MCV RBC AUTO: 92 FL (ref 80–99)
MONOCYTES # BLD: 0.6 K/UL (ref 0–1)
MONOCYTES NFR BLD: 6 % (ref 5–13)
NEUTS SEG # BLD: 7.3 K/UL (ref 1.8–8)
NEUTS SEG NFR BLD: 76 % (ref 32–75)
NRBC # BLD: 0 K/UL (ref 0–0.01)
NRBC BLD-RTO: 0 PER 100 WBC
P-R INTERVAL, ECG05: 158 MS
PHOSPHATE SERPL-MCNC: 3.6 MG/DL (ref 2.6–4.7)
PLATELET # BLD AUTO: 135 K/UL (ref 150–400)
PLATELET # BLD AUTO: 163 K/UL (ref 150–400)
PLATELET # BLD AUTO: 213 K/UL (ref 150–400)
PMV BLD AUTO: 10.4 FL (ref 8.9–12.9)
PMV BLD AUTO: 10.4 FL (ref 8.9–12.9)
PMV BLD AUTO: 11 FL (ref 8.9–12.9)
POTASSIUM SERPL-SCNC: 4 MMOL/L (ref 3.5–5.1)
POTASSIUM SERPL-SCNC: 4.2 MMOL/L (ref 3.5–5.1)
PROT SERPL-MCNC: 5.4 G/DL (ref 6.4–8.2)
PROTHROMBIN TIME: 13 SEC (ref 9–11.1)
Q-T INTERVAL, ECG07: 540 MS
QRS DURATION, ECG06: 98 MS
QTC CALCULATION (BEZET), ECG08: 461 MS
RBC # BLD AUTO: 2.38 M/UL (ref 3.8–5.2)
RBC # BLD AUTO: 2.85 M/UL (ref 3.8–5.2)
RBC # BLD AUTO: 3.43 M/UL (ref 3.8–5.2)
SAMPLES BEING HELD,HOLD: NORMAL
SODIUM SERPL-SCNC: 138 MMOL/L (ref 136–145)
SODIUM SERPL-SCNC: 138 MMOL/L (ref 136–145)
TROPONIN-HIGH SENSITIVITY: 8 NG/L (ref 0–51)
VENTRICULAR RATE, ECG03: 44 BPM
WBC # BLD AUTO: 10 K/UL (ref 3.6–11)
WBC # BLD AUTO: 11.1 K/UL (ref 3.6–11)
WBC # BLD AUTO: 9.4 K/UL (ref 3.6–11)

## 2022-04-15 PROCEDURE — 96360 HYDRATION IV INFUSION INIT: CPT

## 2022-04-15 PROCEDURE — 77030012468 HC VLV BLEEDBK CNTRL ABBT -B

## 2022-04-15 PROCEDURE — 93005 ELECTROCARDIOGRAM TRACING: CPT

## 2022-04-15 PROCEDURE — 74011250636 HC RX REV CODE- 250/636: Performed by: ANESTHESIOLOGY

## 2022-04-15 PROCEDURE — C1894 INTRO/SHEATH, NON-LASER: HCPCS

## 2022-04-15 PROCEDURE — 74011000250 HC RX REV CODE- 250: Performed by: ANESTHESIOLOGY

## 2022-04-15 PROCEDURE — 77030019698 HC SYR ANGI MDLON MRTM -A

## 2022-04-15 PROCEDURE — 85384 FIBRINOGEN ACTIVITY: CPT

## 2022-04-15 PROCEDURE — C1769 GUIDE WIRE: HCPCS

## 2022-04-15 PROCEDURE — 83735 ASSAY OF MAGNESIUM: CPT

## 2022-04-15 PROCEDURE — 74011250636 HC RX REV CODE- 250/636: Performed by: NURSE PRACTITIONER

## 2022-04-15 PROCEDURE — 74011250636 HC RX REV CODE- 250/636: Performed by: EMERGENCY MEDICINE

## 2022-04-15 PROCEDURE — P9045 ALBUMIN (HUMAN), 5%, 250 ML: HCPCS | Performed by: ANESTHESIOLOGY

## 2022-04-15 PROCEDURE — 77030004561 HC CATH ANGI DX COBRA ANGI -B

## 2022-04-15 PROCEDURE — 74011250636 HC RX REV CODE- 250/636: Performed by: RADIOLOGY

## 2022-04-15 PROCEDURE — 77030041052 HC CTRLR AZUR HNDL TERU -C

## 2022-04-15 PROCEDURE — 77030040922 HC BLNKT HYPOTHRM STRY -A

## 2022-04-15 PROCEDURE — B4121ZZ FLUOROSCOPY OF HEPATIC ARTERY USING LOW OSMOLAR CONTRAST: ICD-10-PCS | Performed by: RADIOLOGY

## 2022-04-15 PROCEDURE — 74011000636 HC RX REV CODE- 636: Performed by: EMERGENCY MEDICINE

## 2022-04-15 PROCEDURE — 80053 COMPREHEN METABOLIC PANEL: CPT

## 2022-04-15 PROCEDURE — C1889 IMPLANT/INSERT DEVICE, NOC: HCPCS

## 2022-04-15 PROCEDURE — 74174 CTA ABD&PLVS W/CONTRAST: CPT

## 2022-04-15 PROCEDURE — 74011000250 HC RX REV CODE- 250: Performed by: RADIOLOGY

## 2022-04-15 PROCEDURE — 86921 COMPATIBILITY TEST INCUBATE: CPT

## 2022-04-15 PROCEDURE — 36415 COLL VENOUS BLD VENIPUNCTURE: CPT

## 2022-04-15 PROCEDURE — 85610 PROTHROMBIN TIME: CPT

## 2022-04-15 PROCEDURE — 86920 COMPATIBILITY TEST SPIN: CPT

## 2022-04-15 PROCEDURE — C1887 CATHETER, GUIDING: HCPCS

## 2022-04-15 PROCEDURE — 83690 ASSAY OF LIPASE: CPT

## 2022-04-15 PROCEDURE — 76060000034 HC ANESTHESIA 1.5 TO 2 HR

## 2022-04-15 PROCEDURE — 86922 COMPATIBILITY TEST ANTIGLOB: CPT

## 2022-04-15 PROCEDURE — 74011250637 HC RX REV CODE- 250/637: Performed by: NURSE PRACTITIONER

## 2022-04-15 PROCEDURE — 36430 TRANSFUSION BLD/BLD COMPNT: CPT

## 2022-04-15 PROCEDURE — 85025 COMPLETE CBC W/AUTO DIFF WBC: CPT

## 2022-04-15 PROCEDURE — 84484 ASSAY OF TROPONIN QUANT: CPT

## 2022-04-15 PROCEDURE — 86900 BLOOD TYPING SEROLOGIC ABO: CPT

## 2022-04-15 PROCEDURE — 84100 ASSAY OF PHOSPHORUS: CPT

## 2022-04-15 PROCEDURE — 2709999900 HC NON-CHARGEABLE SUPPLY

## 2022-04-15 PROCEDURE — 37244 VASC EMBOLIZE/OCCLUDE BLEED: CPT

## 2022-04-15 PROCEDURE — 99285 EMERGENCY DEPT VISIT HI MDM: CPT

## 2022-04-15 PROCEDURE — 83605 ASSAY OF LACTIC ACID: CPT

## 2022-04-15 PROCEDURE — 85027 COMPLETE CBC AUTOMATED: CPT

## 2022-04-15 PROCEDURE — 71275 CT ANGIOGRAPHY CHEST: CPT

## 2022-04-15 PROCEDURE — 30233N1 TRANSFUSION OF NONAUTOLOGOUS RED BLOOD CELLS INTO PERIPHERAL VEIN, PERCUTANEOUS APPROACH: ICD-10-PCS | Performed by: HOSPITALIST

## 2022-04-15 PROCEDURE — 65610000006 HC RM INTENSIVE CARE

## 2022-04-15 PROCEDURE — 77010033678 HC OXYGEN DAILY

## 2022-04-15 PROCEDURE — 96361 HYDRATE IV INFUSION ADD-ON: CPT

## 2022-04-15 PROCEDURE — P9016 RBC LEUKOCYTES REDUCED: HCPCS

## 2022-04-15 PROCEDURE — 04L33DZ OCCLUSION OF HEPATIC ARTERY WITH INTRALUMINAL DEVICE, PERCUTANEOUS APPROACH: ICD-10-PCS | Performed by: RADIOLOGY

## 2022-04-15 PROCEDURE — 77030014021 HC SYR ANGI FIX LOK MRTM -A

## 2022-04-15 RX ORDER — LIDOCAINE HYDROCHLORIDE 20 MG/ML
20 INJECTION, SOLUTION INFILTRATION; PERINEURAL
Status: ACTIVE | OUTPATIENT
Start: 2022-04-15 | End: 2022-04-16

## 2022-04-15 RX ORDER — SODIUM CHLORIDE 9 MG/ML
250 INJECTION, SOLUTION INTRAVENOUS AS NEEDED
Status: DISCONTINUED | OUTPATIENT
Start: 2022-04-15 | End: 2022-04-26 | Stop reason: HOSPADM

## 2022-04-15 RX ORDER — PANTOPRAZOLE SODIUM 40 MG/1
40 TABLET, DELAYED RELEASE ORAL EVERY 12 HOURS
Status: DISCONTINUED | OUTPATIENT
Start: 2022-04-15 | End: 2022-04-16

## 2022-04-15 RX ORDER — FENTANYL CITRATE 50 UG/ML
25 INJECTION, SOLUTION INTRAMUSCULAR; INTRAVENOUS ONCE
Status: COMPLETED | OUTPATIENT
Start: 2022-04-15 | End: 2022-04-15

## 2022-04-15 RX ORDER — FENTANYL CITRATE 50 UG/ML
25 INJECTION, SOLUTION INTRAMUSCULAR; INTRAVENOUS
Status: DISCONTINUED | OUTPATIENT
Start: 2022-04-15 | End: 2022-04-16

## 2022-04-15 RX ORDER — EPHEDRINE SULFATE/0.9% NACL/PF 50 MG/5 ML
SYRINGE (ML) INTRAVENOUS AS NEEDED
Status: DISCONTINUED | OUTPATIENT
Start: 2022-04-15 | End: 2022-04-15 | Stop reason: HOSPADM

## 2022-04-15 RX ORDER — SODIUM CHLORIDE 9 MG/ML
250 INJECTION, SOLUTION INTRAVENOUS AS NEEDED
Status: DISCONTINUED | OUTPATIENT
Start: 2022-04-15 | End: 2022-04-19 | Stop reason: HOSPADM

## 2022-04-15 RX ORDER — ALBUMIN HUMAN 50 G/1000ML
SOLUTION INTRAVENOUS AS NEEDED
Status: DISCONTINUED | OUTPATIENT
Start: 2022-04-15 | End: 2022-04-15 | Stop reason: HOSPADM

## 2022-04-15 RX ADMIN — Medication 10 MG: at 14:56

## 2022-04-15 RX ADMIN — WATER 2 G: 1 INJECTION INTRAMUSCULAR; INTRAVENOUS; SUBCUTANEOUS at 15:15

## 2022-04-15 RX ADMIN — PANTOPRAZOLE SODIUM 40 MG: 40 TABLET, DELAYED RELEASE ORAL at 22:44

## 2022-04-15 RX ADMIN — ALBUMIN (HUMAN) 250 ML: 12.5 INJECTION, SOLUTION INTRAVENOUS at 14:56

## 2022-04-15 RX ADMIN — FENTANYL CITRATE 25 MCG: 50 INJECTION, SOLUTION INTRAMUSCULAR; INTRAVENOUS at 22:44

## 2022-04-15 RX ADMIN — FENTANYL CITRATE 25 MCG: 50 INJECTION, SOLUTION INTRAMUSCULAR; INTRAVENOUS at 17:55

## 2022-04-15 RX ADMIN — SODIUM CHLORIDE 1000 ML: 9 INJECTION, SOLUTION INTRAVENOUS at 12:00

## 2022-04-15 RX ADMIN — SODIUM CHLORIDE, POTASSIUM CHLORIDE, SODIUM LACTATE AND CALCIUM CHLORIDE 1000 ML: 600; 310; 30; 20 INJECTION, SOLUTION INTRAVENOUS at 18:33

## 2022-04-15 RX ADMIN — IOPAMIDOL 100 ML: 755 INJECTION, SOLUTION INTRAVENOUS at 13:41

## 2022-04-15 NOTE — PROGRESS NOTES
Consult received from ED. Pt had a previous transjugular biopsy of the liver for liver disease and now appears to have a large liver bleed/hematoma of the liver. She is now in IR to stop the bleeding. Currently there is not much of any surgical role to stop this type of intraparenchymal bleeding. Acute hepatectomy is no possible in this situation. As for the blood in the abdomen washout is not needed, it will get reabsorbed. We can follow along for now but treatment is with IR now and in the future. We will see her after she gets out of angiography.     Nayeli Wesley

## 2022-04-15 NOTE — ED TRIAGE NOTES
Pt arrives via EMS for chest pain that started this morning, radiating to abdomen. Pt states she was also experiencing pain throughout her left shoulder which has subsided currently. Per report, pt does not usually require oxygen, pt currently on 2L NC with sats of 90%. Dr. Darian Singer at bedside.

## 2022-04-15 NOTE — ROUTINE PROCESS
TRANSFER - OUT REPORT:    Verbal report given to Dinorah Subramanian RN on Janette Mcdowell  being transferred to ICU for routine post - op s/p hepatic angiogram with embolization. Report consisted of patients Situation, Background, Assessment and   Recommendations(SBAR). Information from the following report(s) Procedure Summary was reviewed with the receiving nurse. Lines:   Peripheral IV 04/15/22 Right Antecubital (Active)   Site Assessment Clean, dry, & intact 04/15/22 1213   Phlebitis Assessment 0 04/15/22 1213   Infiltration Assessment 0 04/15/22 1213   Dressing Status Clean, dry, & intact 04/15/22 1213   Dressing Type Transparent 04/15/22 1213   Hub Color/Line Status Pink;Flushed 04/15/22 1213   Action Taken Blood drawn 04/15/22 1213       Peripheral IV 04/15/22 Left Antecubital (Active)   Site Assessment Clean, dry, & intact 04/15/22 1213   Phlebitis Assessment 0 04/15/22 1213   Infiltration Assessment 0 04/15/22 1213   Dressing Status Clean, dry, & intact 04/15/22 1213   Dressing Type Transparent 04/15/22 1213   Hub Color/Line Status Pink;Flushed 04/15/22 1213      Pedal pulses palpable. Right groin dressing c/d/i with no signs of hematoma. Pt transferred to ICU. Opportunity for questions and clarification was provided.

## 2022-04-15 NOTE — PROGRESS NOTES
Pt brought to angio from ER for emergent hepatic embolization. Blood pressure low, 82/40 with HR 42. Anesthesia assumed care for potentially unstable pt. Please see anesthesia record for data and vitals.

## 2022-04-15 NOTE — H&P
History and Physical    Patient: Ilya Jones MRN: [id number]  SSN: [id number]    YOB: 1940  Age: 80 y.o. Sex: female      Subjective:      Ilya Jones is a 80 y.o. female with a PMH of cirrhosis, PUD and HTN who underwent a transjugular liver biopsy on 4/12. She is followed by Dr Garcia Glass from GI. She presented to the ED on 4/15 with acute onset chest pain that progressed to epigastric/abdominal pain. BP 82/40 HR 42. CTA abdomen and pelvis performed that revealed \"active intrahepatic arterial contrast extravasation/bleeding, with surrounding intraparenchymal hematoma. There is large quantity hemoperitoneum. 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 This was successfully embolized with coils. She received a bolus of albumin. She has not yet required PRBC transfusion at the time of this note. Past Medical History:   Diagnosis Date    Abnormal histology findings 5/28/2020    ?  Celiac disease 2019    Arthritis     Chronic pain     arthritis/chronic headache daily    Diarrhea 2/28/2019    Family history of colon cancer requiring screening colonoscopy 1/29/2013    GERD (gastroesophageal reflux disease)     High cholesterol     History of gastric ulcer 7/23/2020    Hypertension     Non-alcoholic fatty liver disease 7947    Periumbilical pain 1/98/0156    Psychiatric disorder     ANXIETY AND DEPRESSION    PUD (peptic ulcer disease)      Past Surgical History:   Procedure Laterality Date    COLONOSCOPY N/A 9/20/2018    COLONOSCOPY performed by [facility]orga MD at [facility] ENDOSCOPY    COLONOSCOPY N/A 7/23/2020    COLONOSCOPY performed by Guanako Irwin MD at [facility] ENDOSCOPY    COLONOSCOPY,DIAGNOSTIC  7/23/2020         COLORECTAL SCRN; HI RISK IND  9/20/2018         HX APPENDECTOMY      HX DILATION AND CURETTAGE      miscarriage    HX TONSILLECTOMY      IR BX TRANSCATHETER  4/12/2022    IR OCCL TXCATDWIGHT HEMMORAGE W SI  4/15/2022    KS CHEST SURGERY PROCEDURE UNLISTED  2000    removal of benign cyst from right lung    UPPER GI ENDOSCOPY,BIOPSY  9/20/2018         UPPER GI ENDOSCOPY,BIOPSY  2/28/2019         UPPER GI ENDOSCOPY,BIOPSY  5/28/2020         UPPER GI ENDOSCOPY,BIOPSY  7/23/2020         UPPER GI ENDOSCOPY,BIOPSY  3/5/2021         UPPER GI ENDOSCOPY,BIOPSY  8/19/2021         UPPER GI ENDOSCOPY,W/DILAT,GASTRIC OUT  2/28/2019           Family History   Problem Relation Age of Onset    Cancer Mother         colon    COPD Mother     Heart Disease Father     Other Father         arthritis     Social History     Tobacco Use    Smoking status: Never Smoker    Smokeless tobacco: Never Used   Substance Use Topics    Alcohol use: Not Currently      Prior to Admission medications    Medication Sig Start Date End Date Taking? Authorizing Provider   metoprolol succinate (TOPROL-XL) 50 mg XL tablet Take 50 mg by mouth daily. Provider, Historical   baclofen (LIORESAL) 10 mg tablet Take 10 mg by mouth three (3) times daily. Provider, Historical   traZODone (DESYREL) 50 mg tablet Take 50 mg by mouth nightly. Provider, Historical   esomeprazole (NexIUM) 40 mg capsule Take 40 mg by mouth two (2) times a day. Provider, Historical   vitamin I-F-K-lutein-minerals (OCUVITE) tablet Take 1 Tab by mouth every Monday, Wednesday, Friday. Provider, Historical   docosahexanoic acid/epa (FISH OIL PO) Take 1 Cap by mouth daily. Provider, Historical   multivitamins-minerals-lutein (CENTRUM SILVER) Tab Take 1 Tab by mouth daily. Provider, Historical   CALCIUM CARBONATE/VITAMIN D3 (CALCIUM + D PO) Take 1 Tab by mouth daily.     Provider, Historical        Allergies   Allergen Reactions    Doxycycline Other (comments)     Difficulty swallowing    Diclofenac Cough    Sulfa (Sulfonamide Antibiotics) Unknown (comments)     Unknown reaction in past    Fiorinal-Codeine #3 [Codeine-Butalbital-Asa-Caff] Rash       Review of Systems:  As per HPI    Objective:     Vitals:    04/15/22 1358 04/15/22 1408 04/15/22 1418 04/15/22 1645   BP: (!) 93/41 (!) 80/34 (!) 107/38 (!) 107/44   Pulse: (!) 57 (!) 53 (!) 51 61   Resp: 21 17 (!) 7 21   Temp:    97.4 °F (36.3 °C)   SpO2: 95% 94%  99%   Height:            Physical Exam:  GENERAL:Awake, alert, oriented  EYE: PERRLA  THROAT & NECK: Supple, no JVD  LUNG: CTAB  HEART:Bradycardic, but RRR, 2+ pulses, 1+ edema BLE  ABDOMEN: Mildly firm, distended, mildly tender to light palpation  SKIN: c/d/i  NEUROLOGIC: A/O x3; no focal deficits  PSYCHIATRIC: Calm, cooperative    Assessment:     Hepatic hemorrhage: CT demonstrated \"active intrahepatic arterial contrast extravasation/bleeding, with surrounding intraparenchymal hematoma. There is large quantity hemoperitoneum\". Now s/p angiogram with coiling.    - Repeat CBC/BMP/LA/coags  - Transfuse to keep Hgb >7  - Fluid resuscitation in addition to PRBC if develops hypotension  - Closely evaluate for LOPEZ as patient is high risk to develop  - Close VS monitoring  - IR following, appreciate recs    PUD: PPI q12 as per home dosing    Cirrhosis: No hx of HE, not on home lactulose or rifaximin  - Follow up as outpt    [place] [address]  I had a face to face encounter with the patient, reviewed and interpreted patient data including clinical events, labs, images, vital signs, I/O's, and examined patient. I have discussed the case and the plan and management of the patient's care with the consulting services, the bedside nurses and the respiratory therapist.      NOTE OF PERSONAL INVOLVEMENT IN CARE   This patient has a high probability of imminent, clinically significant deterioration, which requires the highest level of preparedness to intervene urgently.  I participated in the decision-making and personally managed or directed the management of the following life and organ supporting interventions that required my frequent assessment to treat or prevent imminent deterioration. I personally spent 45 minutes of critical care time. This is time spent at this critically ill patient's bedside actively involved in patient care as well as the coordination of care and discussions with the patient's family. This does not include any procedural time which has been billed separately.     Hayden Estrella NP  [facility]  4/15/2022        Signed By: Hayden Estrella NP     April 15, 2022

## 2022-04-15 NOTE — PROCEDURES
Interventional Radiology  Procedure Note        4/15/2022 5:33 PM    Patient: Ilya Jones     Informed consent obtained    Diagnosis: Hepatic hemorrhage     Procedure(s):     Hepatic arteriogram demonstrating large pseudoaneurysm with active hemorrhage off of the mid right hepatic segment 7/6 artery which was successfully embolized with coils. Monitor for continued bleeding and transfuse as necessary.     Specimens removed:  None     Complications: None    Primary Physician: Nubia Martin MD    Recomendations: N/A    Discharge Disposition: ICU    Full dictated report to follow    Signed By: Nubia Martin MD

## 2022-04-15 NOTE — ANESTHESIA PREPROCEDURE EVALUATION
Anesthetic History   No history of anesthetic complications            Review of Systems / Medical History  Patient summary reviewed, nursing notes reviewed and pertinent labs reviewed    Pulmonary  Within defined limits                 Neuro/Psych         Headaches and psychiatric history     Cardiovascular  Within defined limits  Hypertension: poorly controlled              Exercise tolerance: >4 METS     GI/Hepatic/Renal     GERD      PUD and liver disease     Endo/Other  Within defined limits      Arthritis     Other Findings   Comments: Liver hemorrhage           Physical Exam    Airway  Mallampati: II  TM Distance: 4 - 6 cm  Neck ROM: normal range of motion   Mouth opening: Normal     Cardiovascular  Regular rate and rhythm,  S1 and S2 normal,  no murmur, click, rub, or gallop             Dental  No notable dental hx       Pulmonary  Breath sounds clear to auscultation               Abdominal  GI exam deferred       Other Findings            Anesthetic Plan    ASA: 2  Anesthesia type: MAC and general - backup    Monitoring Plan: Arterial line      Induction: Intravenous  Anesthetic plan and risks discussed with: Patient      Minimal to no sedation, requested for hemodynamic support.

## 2022-04-15 NOTE — ED PROVIDER NOTES
this is a 77-year-old female with a history of liver cirrhosis and essential hypertension. She has had an esophagogastric ulcer in the past.  She has been followed by Dr. Delia Szymanski for GI. There is no history of coronary disease or primary lung disease. She states this morning after she got up and started moving around she started developing pain in the left side of her chest.  This progressed to the right shoulder and into her abdomen. She states the pain was beyond 10 at that time. EMS was called. She states the pain did not last very long and is unable to provide definitive timeframe. EMS did not have to give any pain medication. Their EKG suggested an acute STEMI. Review of this tracing does not reveal changes concerning for that diagnosis. At this time, she has pain that is more of an 5/10 more in the epigastrium and abdomen. She has no shortness of breath and has had no nausea or vomiting. She denies any other acute symptomatology. She has not had pain similar to this in the past.  She denies any blood in her stool. There is been no nausea or vomiting. Past Medical History:   Diagnosis Date    Abnormal histology findings 5/28/2020    ?  Celiac disease 2019    Arthritis     Chronic pain     arthritis/chronic headache daily    Diarrhea 2/28/2019    Family history of colon cancer requiring screening colonoscopy 1/29/2013    GERD (gastroesophageal reflux disease)     High cholesterol     History of gastric ulcer 7/23/2020    Hypertension     Non-alcoholic fatty liver disease 1366    Periumbilical pain 4/55/7043    Psychiatric disorder     ANXIETY AND DEPRESSION    PUD (peptic ulcer disease)        Past Surgical History:   Procedure Laterality Date    COLONOSCOPY N/A 9/20/2018    COLONOSCOPY performed by Jean Claude Boyd MD at [facility]COPY    COLONOSCOPY N/A 7/23/2020    COLONOSCOPY performed by Kristy Dudley MD at [facility] ENDOSCOPY    COLONOSCOPY,DIAGNOSTIC  7/23/2020         COLORECTAL SCRN; HI RISK IND  9/20/2018         HX APPENDECTOMY      HX DILATION AND CURETTAGE      miscarriage    HX TONSILLECTOMY      IR BX TRANSCATHETER  4/12/2022    OH CHEST SURGERY PROCEDURE UNLISTED  2000    removal of benign cyst from right lung    UPPER GI ENDOSCOPY,BIOPSY  9/20/2018         UPPER GI ENDOSCOPY,BIOPSY  2/28/2019         UPPER GI ENDOSCOPY,BIOPSY  5/28/2020         UPPER GI ENDOSCOPY,BIOPSY  7/23/2020         UPPER GI ENDOSCOPY,BIOPSY  3/5/2021         UPPER GI ENDOSCOPY,BIOPSY  8/19/2021         UPPER GI ENDOSCOPY,W/DILAT,GASTRIC OUT  2/28/2019              Family History:   Problem Relation Age of Onset    Cancer Mother         colon    COPD Mother     Heart Disease Father     Other Father         arthritis       Social History     Socioeconomic History    Marital status:      Spouse name: Not on file    Number of children: Not on file    Years of education: Not on file    Highest education level: Not on file   Occupational History    Not on file   Tobacco Use    Smoking status: Never Smoker    Smokeless tobacco: Never Used   Vaping Use    Vaping Use: Never used   Substance and Sexual Activity    Alcohol use: Not Currently    Drug use: No    Sexual activity: Not on file   Other Topics Concern    Not on file   Social History Narrative    Not on file     Social Determinants of Health     Financial Resource Strain:     Difficulty of Paying Living Expenses: Not on file   Food Insecurity:     Worried About Running Out of Food in the Last Year: Not on file    Bigg of Food in the Last Year: Not on file   Transportation Needs:     Lack of Transportation (Medical): Not on file    Lack of Transportation (Non-Medical):  Not on file   Physical Activity:     Days of Exercise per Week: Not on file    Minutes of Exercise per Session: Not on file   Stress:     Feeling of Stress : Not on file   Social Connections:     Frequency of Communication with Friends and Family: Not on file    Frequency of Social Gatherings with Friends and Family: Not on file    Attends Sikhism Services: Not on file    Active Member of Clubs or Organizations: Not on file    Attends Club or Organization Meetings: Not on file    Marital Status: Not on file   Intimate Partner Violence:     Fear of Current or Ex-Partner: Not on file    Emotionally Abused: Not on file    Physically Abused: Not on file    Sexually Abused: Not on file   Housing Stability:     Unable to Pay for Housing in the Last Year: Not on file    Number of [place] in the Last Year: Not on file    Unstable Housing in the Last Year: Not on file         ALLERGIES: Doxycycline, Diclofenac, Sulfa (sulfonamide antibiotics), and Fiorinal-codeine #3 [codeine-butalbital-asa-caff]    Review of Systems   Constitutional: Negative for activity change, appetite change, chills, fatigue and fever. HENT: Negative for ear pain, facial swelling, sore throat and trouble swallowing. Eyes: Negative for pain, discharge and visual disturbance. Respiratory: Negative for chest tightness, shortness of breath and wheezing. Cardiovascular: Positive for chest pain. Negative for palpitations. Gastrointestinal: Positive for abdominal pain. Negative for blood in stool, nausea and vomiting. Genitourinary: Negative for difficulty urinating, flank pain and hematuria. Musculoskeletal: Positive for back pain. Negative for arthralgias, joint swelling, myalgias and neck pain. Skin: Negative for color change and rash. Neurological: Negative for dizziness, weakness, numbness and headaches. Hematological: Negative for adenopathy. Does not bruise/bleed easily. Psychiatric/Behavioral: Negative for behavioral problems, confusion and sleep disturbance. All other systems reviewed and are negative.       Vitals:    04/15/22 1154   BP: (!) 81/43   Pulse: (!) 52   Resp: 19   SpO2: 97%   Height: 5' 4\" (1.626 m)            Physical Exam  Vitals and nursing note reviewed. Constitutional:       General: She is in acute distress ( With pain as noted above. ). Appearance: She is well-developed. She is ill-appearing. She is not diaphoretic. Comments: This is a 74-year-old female in a moderate degree of pain more in the epigastrium and abdomen and lower back. Vital signs as noted. HENT:      Head: Normocephalic and atraumatic. Nose: Nose normal.   Eyes:      General: No scleral icterus. Conjunctiva/sclera: Conjunctivae normal.      Pupils: Pupils are equal, round, and reactive to light. Neck:      Thyroid: No thyromegaly. Vascular: No JVD. Trachea: No tracheal deviation. Comments: No carotid bruits noted. Cardiovascular:      Rate and Rhythm: Regular rhythm. Bradycardia present. No extrasystoles are present. Heart sounds: Normal heart sounds. No murmur heard. No friction rub. No gallop. Pulmonary:      Effort: Pulmonary effort is normal. No tachypnea, accessory muscle usage or respiratory distress. Breath sounds: Normal breath sounds. No stridor. No wheezing, rhonchi or rales. Chest:      Chest wall: No tenderness. Abdominal:      General: Bowel sounds are normal. There is no distension. Palpations: Abdomen is soft. There is no mass. Tenderness: There is abdominal tenderness. There is guarding. There is no rebound. Comments: The abdomen is remarkable for significant pain in the epigastrium and right upper quadrant. No definitive masses or and in particular pulsatile masses are noted. Lower abdomen is soft and minimally tender. Musculoskeletal:         General: No tenderness. Normal range of motion. Cervical back: Normal range of motion and neck supple. Lymphadenopathy:      Cervical: No cervical adenopathy. Skin:     General: Skin is warm and dry. Capillary Refill: Capillary refill takes 2 to 3 seconds. Coloration: Skin is pale.       Findings: No erythema or rash. Neurological:      Mental Status: She is alert and oriented to person, place, and time. Cranial Nerves: No cranial nerve deficit. Coordination: Coordination normal.      Deep Tendon Reflexes: Reflexes are normal and symmetric. Psychiatric:         Behavior: Behavior normal.         Thought Content: Thought content normal.         Judgment: Judgment normal.          MDM  Number of Diagnoses or Management Options     Amount and/or Complexity of Data Reviewed  Clinical lab tests: ordered and reviewed  Tests in the radiology section of CPT®: ordered and reviewed  Decide to obtain previous medical records or to obtain history from someone other than the patient: yes  Review and summarize past medical records: yes  Discuss the patient with other providers: yes    Risk of Complications, Morbidity, and/or Mortality  Presenting problems: high  Diagnostic procedures: high  Management options: high    Patient Progress  Patient progress: stable         Procedures    ED MD EKG interpretation: There is normal sinus rhythm at 44 beats a minute. Nonspecific ST and T wave changes are noted. No ectopy or other acute changes appreciated. Danilo Rodriguez MD    The patient has some epigastric type pain and pain in the lower back which is about a 5/10 by her measure. Her heart rate is 44 and her blood pressure is 81. She is alert and oriented x3.  Junk to be pale. She has epigastric and right upper quadrant tenderness. No pulsatile masses noted. Labs and CT of the chest abdomen pelvis were ordered. 12:44 PM patient's pressure is now 180. She still got pain predominantly in the epigastrium and mid abdomen. She is speaking in full sentences. I have asked the nurse to expedite her movement to CT to be sure there is no evidence of aneurysm or dissection. Patient's pain is not increased. 1:54 PM  CT completed. No definitive aneurysm noted. Awaiting official reading.       Not otherwise examined today. Radiology is called about 10 minutes ago with this result. Patient is noted to have brisk arterial bleeding at the site of the liver biopsy from yesterday. Was some question of blood in the abdomen as well. The patient initially was hypotensive when she arrived in the ED and with a small amount of fluid her pressure came up from 80-1 80. She has been remaining steady. In speaking with her several minutes ago her blood pressure was 140. She was complaining of some mild discomfort in the right flank area over the right liver. Within a few minutes her pressure dropped to 93. Blood has been ordered for transfusion. I talked to the joao.  There cannot take the patient directly down to the suite to attempt to embolize the bleeding artery. Her pressure has gone back to 107 from the 93. This was with the little IV fluid. She is continuing to get IV fluid in the blood is being set up and will be taken to the interventional suite as soon as it is ready and will be started there. Patient is awake and alert and only complaining of some discomfort around the area of the liver. She is not having any difficulty breathing. Primary bleeders noted to be an arterial bleeder in the liver. Its not clear that there is any other bleeding site outside of the liver although radiology believes that there is a large amount of blood intra-abdominally. We will consult general surgery as well. .  Blood has been set up and will be ready for use if needed. I did speak with the interventional radiologist and he will bring the patient back downstairs to try and embolize this bleeding arterial site. The patient's pressure is stabilized in the ED in the lower 90s. She was transported downstairs in no acute distress. Radiology also called and stated that the patient has a left ventricular outflow obstruction with cardiomyopathy. A consult with cardiology has been called.   Patient was discussed with Neil Bell Louis Alejandro and he will see. I also discussed the patient with the intensivist.  They will expect the patient in transfer from IR assuming that the procedure is successful.     Total critical care time spent exclusive of procedures:  60 minutes

## 2022-04-15 NOTE — H&P
Interventional and Vascular Radiology History and Physical    Patient: Sera Christina 80 y.o. female       Chief Complaint: Chest Pain      History of Present Illness: hepatic hemorrhage     History:    Past Medical History:   Diagnosis Date    Abnormal histology findings 5/28/2020    ? Celiac disease 2019    Arthritis     Chronic pain     arthritis/chronic headache daily    Diarrhea 2/28/2019    Family history of colon cancer requiring screening colonoscopy 1/29/2013    GERD (gastroesophageal reflux disease)     High cholesterol     History of gastric ulcer 7/23/2020    Hypertension     Non-alcoholic fatty liver disease 3793    Periumbilical pain 9/86/8107    Psychiatric disorder     ANXIETY AND DEPRESSION    PUD (peptic ulcer disease)      Family History   Problem Relation Age of Onset    Cancer Mother         colon    COPD Mother     Heart Disease Father     Other Father         arthritis     Social History     Socioeconomic History    Marital status:      Spouse name: Not on file    Number of children: Not on file    Years of education: Not on file    Highest education level: Not on file   Occupational History    Not on file   Tobacco Use    Smoking status: Never Smoker    Smokeless tobacco: Never Used   Vaping Use    Vaping Use: Never used   Substance and Sexual Activity    Alcohol use: Not Currently    Drug use: No    Sexual activity: Not on file   Other Topics Concern    Not on file   Social History Narrative    Not on file     Social Determinants of Health     Financial Resource Strain:     Difficulty of Paying Living Expenses: Not on file   Food Insecurity:     Worried About Running Out of Food in the Last Year: Not on file    Bigg of Food in the Last Year: Not on file   Transportation Needs:     Lack of Transportation (Medical): Not on file    Lack of Transportation (Non-Medical):  Not on file   Physical Activity:     Days of Exercise per Week: Not on file    Minutes of Exercise per Session: Not on file   Stress:     Feeling of Stress : Not on file   Social Connections:     Frequency of Communication with Friends and Family: Not on file    Frequency of Social Gatherings with Friends and Family: Not on file    Attends Pentecostalism Services: Not on file    Active Member of Clubs or Organizations: Not on file    Attends Club or Organization Meetings: Not on file    Marital Status: Not on file   Intimate Partner Violence:     Fear of Current or Ex-Partner: Not on file    Emotionally Abused: Not on file    Physically Abused: Not on file    Sexually Abused: Not on file   Housing Stability:     Unable to Pay for Housing in the Last Year: Not on file    Number of [place] in the Last Year: Not on file    Unstable Housing in the Last Year: Not on file       Allergies:    Allergies   Allergen Reactions    Doxycycline Other (comments)     Difficulty swallowing    Diclofenac Cough    Sulfa (Sulfonamide Antibiotics) Unknown (comments)     Unknown reaction in past    Fiorinal-Codeine #3 [Codeine-Butalbital-Asa-Caff] Rash       Current Medications:  Current Facility-Administered Medications   Medication Dose Route Frequency    0.9% sodium chloride infusion 250 mL  250 mL IntraVENous PRN    lidocaine (XYLOCAINE) 20 mg/mL (2 %) injection 400 mg  20 mL SubCUTAneous RAD ONCE    iopamidoL (ISOVUE 300) 61 % contrast injection 50 mL  50 mL IntraCATHeter RAD PRN     Facility-Administered Medications Ordered in Other Encounters   Medication Dose Route Frequency    0.9% sodium chloride infusion 250 mL  250 mL IntraVENous PRN    fentaNYL citrate (PF) injection 100 mcg  100 mcg IntraVENous Multiple    0.9% sodium chloride infusion  25 mL/hr IntraVENous CONTINUOUS    0.9% sodium chloride infusion  25 mL/hr IntraVENous CONTINUOUS    midazolam (PF) (VERSED) 1 mg/mL injection 5 mg  5 mg IntraVENous Rad Multiple        Physical Exam:  Blood pressure (!) 107/44, pulse 61, temperature 97.4 °F (36.3 °C), resp. rate 21, height 5' 4\" (1.626 m), SpO2 99 %. LUNG: clear to auscultation bilaterally, HEART: regular rate and rhythm, S1, S2 normal, no murmur, click, rub or gallop      Alerts:    Hospital Problems  Date Reviewed: 8/19/2021          Codes Class Noted POA    Liver hemorrhage ICD-10-CM: K76.89  ICD-9-CM: 573.8  4/15/2022 Unknown              Laboratory:      Recent Labs     04/15/22  1159   HGB 10.0*   HCT 31.0*   WBC 9.4      BUN 15   CREA 0.74   K 4.0         Plan of Care/Planned Procedure:  Risks, benefits, and alternatives reviewed with patient and she agrees to proceed with the procedure. Conscious sedation will be performed with IV fentanyl and versed.  Plan is for hepatic angio and embolization       Louie Baker MD

## 2022-04-15 NOTE — ANESTHESIA POSTPROCEDURE EVALUATION
Post-Anesthesia Evaluation and Assessment    Patient: Lane Wright MRN: [id number]  SSN: [id number]    YOB: 1940  Age: 80 y.o. Sex: female      I have evaluated the patient and they are stable and ready for discharge from the PACU. Cardiovascular Function/Vital Signs  Visit Vitals  /63   Pulse 62   Temp 36.9 °C (98.5 °F)   Resp 14   SpO2 100%       Patient is status post * No anesthesia type entered * anesthesia for * No procedures listed *. Nausea/Vomiting: None    Postoperative hydration reviewed and adequate. Pain:  Pain Scale 1: Numeric (0 - 10) (04/15/22 1620)   Managed    Neurological Status: At baseline    Mental Status, Level of Consciousness: Alert and  oriented to person, place, and time    Pulmonary Status:   O2 Device: Nasal cannula (04/15/22 162)   Adequate oxygenation and airway patent    Complications related to anesthesia: None    Post-anesthesia assessment completed. In ICU.     Signed By: Jamia Rodriguez MD     April 15, 2022

## 2022-04-16 LAB
ANION GAP SERPL CALC-SCNC: 5 MMOL/L (ref 5–15)
BUN SERPL-MCNC: 22 MG/DL (ref 6–20)
BUN/CREAT SERPL: 33 (ref 12–20)
CALCIUM SERPL-MCNC: 7.7 MG/DL (ref 8.5–10.1)
CHLORIDE SERPL-SCNC: 107 MMOL/L (ref 97–108)
CO2 SERPL-SCNC: 25 MMOL/L (ref 21–32)
CREAT SERPL-MCNC: 0.67 MG/DL (ref 0.55–1.02)
ERYTHROCYTE [DISTWIDTH] IN BLOOD BY AUTOMATED COUNT: 12.9 % (ref 11.5–14.5)
ERYTHROCYTE [DISTWIDTH] IN BLOOD BY AUTOMATED COUNT: 13.1 % (ref 11.5–14.5)
GLUCOSE SERPL-MCNC: 102 MG/DL (ref 65–100)
HCT VFR BLD AUTO: 24.3 % (ref 35–47)
HCT VFR BLD AUTO: 27.2 % (ref 35–47)
HGB BLD-MCNC: 7.9 G/DL (ref 11.5–16)
HGB BLD-MCNC: 8.9 G/DL (ref 11.5–16)
MAGNESIUM SERPL-MCNC: 2 MG/DL (ref 1.6–2.4)
MCH RBC QN AUTO: 28.9 PG (ref 26–34)
MCH RBC QN AUTO: 29.3 PG (ref 26–34)
MCHC RBC AUTO-ENTMCNC: 32.5 G/DL (ref 30–36.5)
MCHC RBC AUTO-ENTMCNC: 32.7 G/DL (ref 30–36.5)
MCV RBC AUTO: 89 FL (ref 80–99)
MCV RBC AUTO: 89.5 FL (ref 80–99)
NRBC # BLD: 0 K/UL (ref 0–0.01)
NRBC # BLD: 0 K/UL (ref 0–0.01)
NRBC BLD-RTO: 0 PER 100 WBC
NRBC BLD-RTO: 0 PER 100 WBC
PHOSPHATE SERPL-MCNC: 3.8 MG/DL (ref 2.6–4.7)
PLATELET # BLD AUTO: 159 K/UL (ref 150–400)
PLATELET # BLD AUTO: 169 K/UL (ref 150–400)
PMV BLD AUTO: 10.5 FL (ref 8.9–12.9)
PMV BLD AUTO: 11.4 FL (ref 8.9–12.9)
POTASSIUM SERPL-SCNC: 4.3 MMOL/L (ref 3.5–5.1)
RBC # BLD AUTO: 2.73 M/UL (ref 3.8–5.2)
RBC # BLD AUTO: 3.04 M/UL (ref 3.8–5.2)
SODIUM SERPL-SCNC: 137 MMOL/L (ref 136–145)
WBC # BLD AUTO: 10.4 K/UL (ref 3.6–11)
WBC # BLD AUTO: 9 K/UL (ref 3.6–11)

## 2022-04-16 PROCEDURE — 74011250637 HC RX REV CODE- 250/637: Performed by: NURSE PRACTITIONER

## 2022-04-16 PROCEDURE — 99231 SBSQ HOSP IP/OBS SF/LOW 25: CPT | Performed by: SURGERY

## 2022-04-16 PROCEDURE — 74011000250 HC RX REV CODE- 250: Performed by: NURSE PRACTITIONER

## 2022-04-16 PROCEDURE — 85027 COMPLETE CBC AUTOMATED: CPT

## 2022-04-16 PROCEDURE — 80048 BASIC METABOLIC PNL TOTAL CA: CPT

## 2022-04-16 PROCEDURE — 84100 ASSAY OF PHOSPHORUS: CPT

## 2022-04-16 PROCEDURE — 74011250636 HC RX REV CODE- 250/636: Performed by: NURSE PRACTITIONER

## 2022-04-16 PROCEDURE — 65660000001 HC RM ICU INTERMED STEPDOWN

## 2022-04-16 PROCEDURE — 36415 COLL VENOUS BLD VENIPUNCTURE: CPT

## 2022-04-16 PROCEDURE — 83735 ASSAY OF MAGNESIUM: CPT

## 2022-04-16 RX ORDER — FAMOTIDINE 40 MG/1
40 TABLET, FILM COATED ORAL 2 TIMES DAILY
COMMUNITY

## 2022-04-16 RX ORDER — FAMOTIDINE 20 MG/1
40 TABLET, FILM COATED ORAL 2 TIMES DAILY
Status: DISCONTINUED | OUTPATIENT
Start: 2022-04-16 | End: 2022-04-16 | Stop reason: DRUGHIGH

## 2022-04-16 RX ORDER — LIDOCAINE 4 G/100G
1 PATCH TOPICAL EVERY 24 HOURS
Status: DISCONTINUED | OUTPATIENT
Start: 2022-04-16 | End: 2022-04-26 | Stop reason: HOSPADM

## 2022-04-16 RX ORDER — METOPROLOL TARTRATE 25 MG/1
25 TABLET, FILM COATED ORAL 2 TIMES DAILY
Status: DISCONTINUED | OUTPATIENT
Start: 2022-04-16 | End: 2022-04-23

## 2022-04-16 RX ORDER — FENTANYL CITRATE 50 UG/ML
25 INJECTION, SOLUTION INTRAMUSCULAR; INTRAVENOUS
Status: DISCONTINUED | OUTPATIENT
Start: 2022-04-16 | End: 2022-04-26 | Stop reason: HOSPADM

## 2022-04-16 RX ORDER — TRAZODONE HYDROCHLORIDE 50 MG/1
50 TABLET ORAL
Status: DISCONTINUED | OUTPATIENT
Start: 2022-04-16 | End: 2022-04-26 | Stop reason: HOSPADM

## 2022-04-16 RX ORDER — HYDRALAZINE HYDROCHLORIDE 20 MG/ML
10 INJECTION INTRAMUSCULAR; INTRAVENOUS
Status: DISCONTINUED | OUTPATIENT
Start: 2022-04-16 | End: 2022-04-18

## 2022-04-16 RX ORDER — ACETAMINOPHEN 325 MG/1
650 TABLET ORAL
Status: DISCONTINUED | OUTPATIENT
Start: 2022-04-16 | End: 2022-04-26 | Stop reason: HOSPADM

## 2022-04-16 RX ORDER — FENTANYL CITRATE 50 UG/ML
25 INJECTION, SOLUTION INTRAMUSCULAR; INTRAVENOUS
Status: DISCONTINUED | OUTPATIENT
Start: 2022-04-16 | End: 2022-04-16

## 2022-04-16 RX ORDER — FAMOTIDINE 20 MG/1
20 TABLET, FILM COATED ORAL 2 TIMES DAILY
Status: DISCONTINUED | OUTPATIENT
Start: 2022-04-16 | End: 2022-04-26 | Stop reason: HOSPADM

## 2022-04-16 RX ADMIN — METOPROLOL TARTRATE 25 MG: 25 TABLET, FILM COATED ORAL at 18:42

## 2022-04-16 RX ADMIN — FENTANYL CITRATE 25 MCG: 0.05 INJECTION, SOLUTION INTRAMUSCULAR; INTRAVENOUS at 12:06

## 2022-04-16 RX ADMIN — FENTANYL CITRATE 25 MCG: 0.05 INJECTION, SOLUTION INTRAMUSCULAR; INTRAVENOUS at 09:13

## 2022-04-16 RX ADMIN — FENTANYL CITRATE 25 MCG: 0.05 INJECTION, SOLUTION INTRAMUSCULAR; INTRAVENOUS at 22:15

## 2022-04-16 RX ADMIN — ACETAMINOPHEN 650 MG: 325 TABLET ORAL at 16:06

## 2022-04-16 RX ADMIN — FAMOTIDINE 20 MG: 20 TABLET ORAL at 09:13

## 2022-04-16 RX ADMIN — ACETAMINOPHEN 650 MG: 325 TABLET ORAL at 08:56

## 2022-04-16 RX ADMIN — TRAZODONE HYDROCHLORIDE 50 MG: 50 TABLET ORAL at 22:15

## 2022-04-16 RX ADMIN — FENTANYL CITRATE 25 MCG: 50 INJECTION, SOLUTION INTRAMUSCULAR; INTRAVENOUS at 06:19

## 2022-04-16 RX ADMIN — FENTANYL CITRATE 25 MCG: 0.05 INJECTION, SOLUTION INTRAMUSCULAR; INTRAVENOUS at 18:52

## 2022-04-16 RX ADMIN — FAMOTIDINE 20 MG: 20 TABLET ORAL at 18:42

## 2022-04-16 RX ADMIN — FENTANYL CITRATE 25 MCG: 50 INJECTION, SOLUTION INTRAMUSCULAR; INTRAVENOUS at 01:32

## 2022-04-16 NOTE — PROGRESS NOTES
Famotidine - Renal dosing by Pharmacy  Current regimen:  40 mg PO Q 12 hr  Recent Labs     04/16/22  0608 04/15/22  1742 04/15/22  1159   CREA 0.67 0.67 0.74   BUN 22* 14 15     Estimated CrCl:  56.3 ml/min    Plan:   Change dosage to 20 mg PO Q 12 hr with respect to estimated creatinine clearance (CrCl >50 mL/min) per MEC/P&T-approved Renal Dosing Adjustment protocol. Pharmacy will continue to monitor this patient daily for changes in clinical status and renal function. Please contact pharmacy with any questions/clarifications at .      Siri Bowie, PharmD  Clinical Pharmacist, Orthopedics and Med/Surg  [facility] ()      ------  Famotidine (PO/IV) Renal Dosing:      >50 mL/min - 20 mg Q 12hrs      <50 mL/min - 20 mg Q 24hrs                    HD - 20 mg Q 24hrs               CRRT - n/a

## 2022-04-16 NOTE — PROGRESS NOTES
1645 TRANSFER - IN REPORT:    Verbal report received from REJI Beasley RN(name) on Lane Wright  being received from [facility](unit) for routine post - op      Report consisted of patients Situation, Background, Assessment and   Recommendations(SBAR). Information from the following report(s) SBAR, Kardex, ED Summary, OR Summary, Procedure Summary, Intake/Output, MAR, Accordion, Recent Results, Med Rec Status, Cardiac Rhythm Sinus yogi/sinus rhythm and Alarm Parameters  was reviewed with the receiving nurse. Opportunity for questions and clarification was provided. Assessment completed upon patients arrival to unit and care assumed. 1833 1 L LR bolus started. 1900 1 unit PRBC started. 1930 Bedside and Verbal shift change report given to KENNETH Nguyen (oncoming nurse) by Marc Karimi RN (offgoing nurse). Report included the following information SBAR, Kardex, ED Summary, OR Summary, Procedure Summary, Intake/Output, MAR, Accordion, Recent Results, Med Rec Status, Cardiac Rhythm Sinus yogi/sinus rhythm and Alarm Parameters .

## 2022-04-16 NOTE — PROGRESS NOTES
0730: Bedside shift change report given to Paco Pina RN (oncoming nurse) by Uriah Orantes RN (offgoing nurse). Report included the following information SBAR, Kardex, Intake/Output, MAR, Accordion and Recent Results. 1836: Cardiology consult called. 1912: TRANSFER - OUT REPORT:    Telephone report given to [facility], RN (name) on Lane Wright  being transferred to [facility] (unit) for routine progression of care       Report consisted of patients Situation, Background, Assessment and   Recommendations(SBAR). Information from the following report(s) SBAR, Kardex, Intake/Output, MAR, Accordion and Recent Results was reviewed with the receiving nurse.

## 2022-04-16 NOTE — PROGRESS NOTES
CRITICAL CARE DAILY PROGRESS NOTE          Name: Jen Zavaleta   : 1940   MRN: [id number]   Date: 2022      REASON FOR ICU ADMISSION: Hepatic hemorrhage with blood loss anemia    IMPRESSION/PROBLEM LIST   Hepatic hemorrhage s/p successful coil embolization  Blood loss anemia    ASSESSMENT & PLAN   81 y/o female with PMHx of PUD, cirrhosis and HTN and recent () transjugular liver biopsy to evaluate for liver disease admitted (4/15) for large pseudoaneurysm with active hemorrhage of the mid right hepatic segment artery s/p successful coil embolization. 24 HOUR EVENTS   · Hgb relatively stable 8.4-> 7.9, repeat this afternoon  · Diet advanced to clears,   · pain appears to be the biggest issue, continue to provide analgesia as indicated    NEUROLOGICAL   · No history of hepatic encephalopathy, not on home lactulose or rifaximin  · Awake, oriented, no neurological deficit  · Routine neurological monitoring  · As needed Tylenol and fentanyl    PULMONOLOGY   · No known history of pulmonary disease  · Saturating well on room air  · Routine pulmonary hygiene    CARDIOVASCULAR   Hx of HTN  · Sinus rhythm on telemetry, hemodynamically stable. Goal MAP > 65  · Currently holding home metoprolol, resume as indicated    GASTROINTESTINAL/GENITOURINARY:   Hx of cirrhosis and PUD  Hepatic hemorrhage  · Followed by Dr. Erna Hammond (GI) s/p recent transjugular liver biopsy ()  · CTA abdomen/pelvis demonstrated  \"active intrahepatic arterial contrast extravasation/bleeding, with surrounding intraparenchymal hematoma.   There is a large quantity pneumoperitoneum\"  · S/p hepatic arteriogram demonstrating a large pseudoaneurysm with active hemorrhage of the mid right hepatic segment artery s/p successful coil embolization  · Continue home famotidine  · Clear liquid diet    RENAL/ELECTROLYTE/FLUIDS   · BUN/creatinine-stable    ENDOCRINE:   · No known history of diabetes or thyroid disease    HEMATOLOGY/ONCOLOGY   Blood loss anemia in the setting of hepatic hemorrhage  · Received 1 unit of PRBCs this admission  · Trend H&H. Transfuse for hemoglobin goal greater than 7  · SCDs for DVT prophylaxis    INFECTIOUS DISEASE   · Afebrile, no active issues    ANTIBIOTICS TO DATE:  · Cefazolin (4/15-4/15)     CULTURES TO DATE:  · None    ICU DAILY CHECKLIST     Code Status:FULL  DVT Prophylaxis:SCDS  T/LD: PIV  PPI:Famotidine  Diet: Clear  PT/OT: Ordered  Goals of Care Discussion with family Yes   Plan of Care/Family Update:  Discussed Care Plan with Bedside RN  Documentation of Current Medications      Binhkristenhalley Bernal H & P:    Arpit Later is a 80 y.o. female with a PMH of cirrhosis, PUD and HTN who underwent a transjugular liver biopsy on 4/12. She is followed by Dr Bill Borrego from GI. She presented to the ED on 4/15 with acute onset chest pain that progressed to epigastric/abdominal pain. BP 82/40 HR 42. CTA abdomen and pelvis performed that revealed \"active intrahepatic arterial contrast extravasation/bleeding, with surrounding intraparenchymal hematoma. There is large quantity hemoperitoneum. \"She was taken to IR and hepatic arteriogram demonstrated large pseudoaneurysm with active hemorrhage off the mid R hepatic segment artery. This was successfully embolized with coils. She received a bolus of albumin. She has not yet required PRBC transfusion at the time of this note.     Subjective:   Progress Note: 4/16/2022      POD:   No surgery found     S/P:   Coil embolization of large pseudoaneurysm with active hemorrhage off the mid R hepatic segement    Active Problem List:     Problem List  Date Reviewed: 8/19/2021          Codes Class    Liver hemorrhage ICD-10-CM: K76.89  ICD-9-CM: 573.8         History of gastric ulcer ICD-10-CM: Z87.11  ICD-9-CM: V12.79         Periumbilical pain DBE-78-GY: R10.33  ICD-9-CM: 789.05         Abnormal histology findings ICD-10-CM: R89.7  ICD-9-CM: 795.4     Overview Signed 5/28/2020 7:19 AM by Lc Mcfarlnae MD     ? Celiac disease 2019             Diarrhea ICD-10-CM: R19.7  ICD-9-CM: 787.91         Family history of colon cancer requiring screening colonoscopy ICD-10-CM: Z80.0  ICD-9-CM: V16.0     Overview Signed 1/29/2013  1:13 PM by Lc Mcfarlane MD     (mother)             Dyspepsia ICD-10-CM: R10.13  ICD-9-CM: 536.8               Past Medical History:      has a past medical history of Abnormal histology findings (5/28/2020), Arthritis, Chronic pain, Diarrhea (2/28/2019), Family history of colon cancer requiring screening colonoscopy (1/29/2013), GERD (gastroesophageal reflux disease), High cholesterol, History of gastric ulcer (7/23/2020), Hypertension, Non-alcoholic fatty liver disease (2466), Periumbilical pain (1/34/9334), Psychiatric disorder, and PUD (peptic ulcer disease). Past Surgical History:      has a past surgical history that includes hx tonsillectomy; upper gi endoscopy,biopsy (9/20/2018); colorectal scrn; hi risk ind (9/20/2018); colonoscopy (N/A, 9/20/2018); upper gi endoscopy,w/dilat,gastric out (2/28/2019); upper gi endoscopy,biopsy (2/28/2019); upper gi endoscopy,biopsy (5/28/2020); colonoscopy,biopsy (7/23/2020); upper gi endoscopy,biopsy (7/23/2020); colonoscopy (N/A, 7/23/2020); hx appendectomy; pr chest surgery procedure unlisted (2000); hx dilation and curettage; upper gi endoscopy,biopsy (3/5/2021); upper gi endoscopy,biopsy (8/19/2021); ir bx transcatheter (4/12/2022); and ir occl txcath hemmorage w si (4/15/2022). Home Medications:     Prior to Admission medications    Medication Sig Start Date End Date Taking? Authorizing Provider   famotidine (PEPCID) 40 mg tablet Take 40 mg by mouth two (2) times a day. Yes Provider, Historical   traZODone (DESYREL) 50 mg tablet Take 50 mg by mouth nightly. Yes Provider, Historical   metoprolol succinate (TOPROL-XL) 50 mg XL tablet Take 50 mg by mouth daily.     Provider, Historical   baclofen (LIORESAL) 10 mg tablet Take 10 mg by mouth three (3) times daily. Provider, Historical   vitamin E-J-Q-lutein-minerals (OCUVITE) tablet Take 1 Tab by mouth every Monday, Wednesday, Friday. Provider, Historical   docosahexanoic acid/epa (FISH OIL PO) Take 1 Cap by mouth daily. Provider, Historical   multivitamins-minerals-lutein (CENTRUM SILVER) Tab Take 1 Tab by mouth daily. Provider, Historical   CALCIUM CARBONATE/VITAMIN D3 (CALCIUM + D PO) Take 1 Tab by mouth daily. Provider, Historical       Allergies/Social/Family History: Allergies   Allergen Reactions    Doxycycline Other (comments)     Difficulty swallowing    Diclofenac Cough    Sulfa (Sulfonamide Antibiotics) Unknown (comments)     Unknown reaction in past    Fiorinal-Codeine #3 [Codeine-Butalbital-Asa-Caff] Rash      Social History     Tobacco Use    Smoking status: Never Smoker    Smokeless tobacco: Never Used   Substance Use Topics    Alcohol use: Not Currently      Family History   Problem Relation Age of Onset    Cancer Mother         colon    COPD Mother     Heart Disease Father     Other Father         arthritis       Review of Systems:     Review of Systems   Constitutional: Positive for malaise/fatigue. HENT: Negative. Eyes: Negative. Respiratory: Negative. Cardiovascular: Negative. Gastrointestinal: Positive for abdominal pain. Negative for nausea. R flank/RUQ abdominal pain, radiates across mid-epigastric region   Genitourinary: Negative. Musculoskeletal: Negative. Skin: Negative. Neurological: Negative. Endo/Heme/Allergies: Negative. Psychiatric/Behavioral: Negative. Objective:     Physical Exam  Vitals reviewed. Constitutional:       General: She is not in acute distress. Appearance: Normal appearance. She is normal weight. She is ill-appearing. HENT:      Head: Normocephalic and atraumatic. Nose: Nose normal.      Mouth/Throat:      Mouth: Mucous membranes are dry.    Eyes: General: No scleral icterus. Pupils: Pupils are equal, round, and reactive to light. Cardiovascular:      Rate and Rhythm: Normal rate and regular rhythm. Pulses: Normal pulses. Heart sounds: Normal heart sounds. Pulmonary:      Effort: Pulmonary effort is normal.      Breath sounds: Normal breath sounds. No wheezing or rhonchi. Abdominal:      General: Abdomen is flat. Bowel sounds are normal.      Palpations: Abdomen is soft. Comments: R flank pain, generalized abdominal discomfort radiates across mid- epigastric region  R groin site- C/D/I, soft, no hematoma   Musculoskeletal:         General: Normal range of motion. Cervical back: Normal range of motion and neck supple. Right lower leg: No edema. Left lower leg: No edema. Skin:     General: Skin is warm and dry. Neurological:      General: No focal deficit present. Mental Status: She is alert and oriented to person, place, and time. Mental status is at baseline.    Psychiatric:         Mood and Affect: Mood normal.          Vital Signs:  Visit Vitals  BP (!) 155/65   Pulse 84   Temp 98.5 °F (36.9 °C)   Resp 26   Ht 5' 4\" (1.626 m)   Wt 58 kg (127 lb 13.9 oz)   SpO2 97%   BMI 21.95 kg/m²    O2 Flow Rate (L/min): 2 l/min O2 Device: (P) None (Room air) Temp (24hrs), Av.8 °F (36.6 °C), Min:97.1 °F (36.2 °C), Max:98.5 °F (36.9 °C)           Intake/Output:     Intake/Output Summary (Last 24 hours) at 2022 0928  Last data filed at 2022 0619  Gross per 24 hour   Intake 1802.5 ml   Output 450 ml   Net 1352.5 ml         LABS AND  DATA: Personally reviewed  Recent Labs     22  0608 04/15/22  2147   WBC 9.0 10.0   HGB 7.9* 8.4*   HCT 24.3* 25.4*    135*     Recent Labs     22  0608 04/15/22  1742    138   K 4.3 4.2    107   CO2 25 24   BUN 22* 14   CREA 0.67 0.67   * 136*   CA 7.7* 7.5*   MG 2.0 2.0   PHOS 3.8 3.6     Recent Labs     04/15/22  1159   AP 69   TP 5.4*   ALB 2.7*   GLOB 2.7   LPSE 107     Recent Labs     04/15/22  1755   INR 1.3*   PTP 13.0*      No results for input(s): PHI, PCO2I, PO2I, FIO2I in the last 72 hours. No results for input(s): CPK, CKMB, TROIQ, BNPP in the last 72 hours. Hemodynamics:   PAP:   CO:     Wedge:   CI:     CVP:    SVR:       PVR:       Ventilator Settings:  Mode Rate Tidal Volume Pressure FiO2 PEEP                    Peak airway pressure:      Minute ventilation:          MEDS: Reviewed    Chest X-Ray:  CXR Results  (Last 48 hours)    None            ECHO:    No results found for this visit on 04/15/22. Multidisciplinary Rounds Completed: Yes    ABCDEF Bundle/Checklist Completed:  Yes    SPECIAL EQUIPMENT  None    DISPOSITION  Stay in ICU    CRITICAL CARE DOCUMENTATION  I had a face to face encounter with the patient, reviewed and interpreted patient data including clinical events, labs, images, vital signs, I/O's, and examined patient. I have discussed the case and the plan and management of the patient's care with the consulting services, the bedside nurses and the respiratory therapist.      NOTE OF PERSONAL INVOLVEMENT IN CARE   This patient has a high probability of imminent, clinically significant deterioration, which requires the highest level of preparedness to intervene urgently. I participated in the decision-making and personally managed or directed the management of the following life and organ supporting interventions that required my frequent assessment to treat or prevent imminent deterioration. I personally spent 47 minutes of critical care time. This is time spent at this critically ill patient's bedside actively involved in patient care as well as the coordination of care. This does not include any procedural time which has been billed separately.     [address] [place] [address]  4/16/2022

## 2022-04-16 NOTE — PROGRESS NOTES
Bedside shift change report received from [facility]. Report included the following information SBAR, Kardex, Procedure Summary, Intake/Output, MAR and Recent Results. Shift Summary: Uneventful shift. VSS, on RA. RUQ pain.

## 2022-04-16 NOTE — PROGRESS NOTES
Progress Note    Patient: Mauro Begum MRN: [id number]  SSN: [id number]    YOB: 1940  Age: 80 y.o. Sex: female      Admit Date: 4/15/2022    Hepatic hemorrahge    Subjective:     No acute surgical issues. Pt is doing okay. She had sucsessful coil embolization of liver bleed last night. Hemoglobin is stable     Objective:     Visit Vitals  BP (!) 146/63   Pulse 90   Temp 98.3 °F (36.8 °C)   Resp 25   Ht 5' 4\" (1.626 m)   Wt 127 lb 13.9 oz (58 kg)   SpO2 100%   BMI 21.95 kg/m²       Temp (24hrs), Av.9 °F (36.6 °C), Min:97.1 °F (36.2 °C), Max:98.5 °F (36.9 °C)        Physical Exam:    Gen:  NAD  Pulm:  Unlabored  Abd:  S/mildly distended/Moderate TTP    Recent Results (from the past 24 hour(s))   RBC, ALLOCATE    Collection Time: 04/15/22  2:15 PM   Result Value Ref Range    HISTORY CHECKED? Historical check performed    TYPE & SCREEN    Collection Time: 04/15/22  2:24 PM   Result Value Ref Range    Crossmatch Expiration 2022,2359     ABO/Rh(D) B NEGATIVE     Antibody screen NEG     Comment PREVIOUSLY IDENTIFIED ANTI D      Unit number [id number]     Blood component type  LR,1     Unit division 00     Status of unit TRANSFUSED     Crossmatch result Compatible     Unit number [id number]     Blood component type  LR,1     Unit division 00     Status of unit ALLOCATED     Crossmatch result Compatible    RBC, ALLOCATE    Collection Time: 04/15/22  3:30 PM   Result Value Ref Range    HISTORY CHECKED?  Historical check performed    CBC W/O DIFF    Collection Time: 04/15/22  5:42 PM   Result Value Ref Range    WBC 11.1 (H) 3.6 - 11.0 K/uL    RBC 2.38 (L) 3.80 - 5.20 M/uL    HGB 7.0 (L) 11.5 - 16.0 g/dL    HCT 21.9 (L) 35.0 - 47.0 %    MCV 92.0 80.0 - 99.0 FL    MCH 29.4 26.0 - 34.0 PG    MCHC 32.0 30.0 - 36.5 g/dL    RDW 12.1 11.5 - 14.5 %    PLATELET 110 179 - 420 K/uL    MPV 11.0 8.9 - 12.9 FL    NRBC 0.0 0  WBC    ABSOLUTE NRBC 0.00 0.00 - 4.55 K/uL   METABOLIC PANEL, BASIC Collection Time: 04/15/22  5:42 PM   Result Value Ref Range    Sodium 138 136 - 145 mmol/L    Potassium 4.2 3.5 - 5.1 mmol/L    Chloride 107 97 - 108 mmol/L    CO2 24 21 - 32 mmol/L    Anion gap 7 5 - 15 mmol/L    Glucose 136 (H) 65 - 100 mg/dL    BUN 14 6 - 20 MG/DL    Creatinine 0.67 0.55 - 1.02 MG/DL    BUN/Creatinine ratio 21 (H) 12 - 20      GFR est AA >60 >60 ml/min/1.73m2    GFR est non-AA >60 >60 ml/min/1.73m2    Calcium 7.5 (L) 8.5 - 10.1 MG/DL   MAGNESIUM    Collection Time: 04/15/22  5:42 PM   Result Value Ref Range    Magnesium 2.0 1.6 - 2.4 mg/dL   PHOSPHORUS    Collection Time: 04/15/22  5:42 PM   Result Value Ref Range    Phosphorus 3.6 2.6 - 4.7 MG/DL   PROTHROMBIN TIME + INR    Collection Time: 04/15/22  5:55 PM   Result Value Ref Range    INR 1.3 (H) 0.9 - 1.1      Prothrombin time 13.0 (H) 9.0 - 11.1 sec   FIBRINOGEN    Collection Time: 04/15/22  5:55 PM   Result Value Ref Range    Fibrinogen 187 (L) 200 - 475 mg/dL   LACTIC ACID    Collection Time: 04/15/22  5:55 PM   Result Value Ref Range    Lactic acid 2.3 (HH) 0.4 - 2.0 MMOL/L   RBC, ALLOCATE    Collection Time: 04/15/22  6:45 PM   Result Value Ref Range    HISTORY CHECKED?  Historical check performed    CBC W/O DIFF    Collection Time: 04/15/22  9:47 PM   Result Value Ref Range    WBC 10.0 3.6 - 11.0 K/uL    RBC 2.85 (L) 3.80 - 5.20 M/uL    HGB 8.4 (L) 11.5 - 16.0 g/dL    HCT 25.4 (L) 35.0 - 47.0 %    MCV 89.1 80.0 - 99.0 FL    MCH 29.5 26.0 - 34.0 PG    MCHC 33.1 30.0 - 36.5 g/dL    RDW 12.8 11.5 - 14.5 %    PLATELET 861 (L) 023 - 400 K/uL    MPV 10.4 8.9 - 12.9 FL    NRBC 0.0 0  WBC    ABSOLUTE NRBC 0.00 0.00 - 0.01 K/uL   LACTIC ACID    Collection Time: 04/15/22  9:48 PM   Result Value Ref Range    Lactic acid 1.7 0.4 - 2.0 MMOL/L   CBC W/O DIFF    Collection Time: 04/16/22  6:08 AM   Result Value Ref Range    WBC 9.0 3.6 - 11.0 K/uL    RBC 2.73 (L) 3.80 - 5.20 M/uL    HGB 7.9 (L) 11.5 - 16.0 g/dL    HCT 24.3 (L) 35.0 - 47.0 % MCV 89.0 80.0 - 99.0 FL    MCH 28.9 26.0 - 34.0 PG    MCHC 32.5 30.0 - 36.5 g/dL    RDW 12.9 11.5 - 14.5 %    PLATELET 985 014 - 673 K/uL    MPV 11.4 8.9 - 12.9 FL    NRBC 0.0 0  WBC    ABSOLUTE NRBC 0.00 0.00 - 6.48 K/uL   METABOLIC PANEL, BASIC    Collection Time: 04/16/22  6:08 AM   Result Value Ref Range    Sodium 137 136 - 145 mmol/L    Potassium 4.3 3.5 - 5.1 mmol/L    Chloride 107 97 - 108 mmol/L    CO2 25 21 - 32 mmol/L    Anion gap 5 5 - 15 mmol/L    Glucose 102 (H) 65 - 100 mg/dL    BUN 22 (H) 6 - 20 MG/DL    Creatinine 0.67 0.55 - 1.02 MG/DL    BUN/Creatinine ratio 33 (H) 12 - 20      GFR est AA >60 >60 ml/min/1.73m2    GFR est non-AA >60 >60 ml/min/1.73m2    Calcium 7.7 (L) 8.5 - 10.1 MG/DL   MAGNESIUM    Collection Time: 04/16/22  6:08 AM   Result Value Ref Range    Magnesium 2.0 1.6 - 2.4 mg/dL   PHOSPHORUS    Collection Time: 04/16/22  6:08 AM   Result Value Ref Range    Phosphorus 3.8 2.6 - 4.7 MG/DL       Assessment:     Hospital Problems  Date Reviewed: 8/19/2021          Codes Class Noted POA    Liver hemorrhage ICD-10-CM: K76.89  ICD-9-CM: 573.8  4/15/2022 Unknown              Plan/Recommendations/Medical Decision Making:     - Hepatic hemorrhage:  No acute indication for operation. Successful coil embolization last night.   Continue to monitor hemoglobin level and transfuse as indicated  - Clear liquid diet and advance as tolerated  - Pain control

## 2022-04-16 NOTE — PROGRESS NOTES
[facility]  Hospitalist Group     ICU Transfer/Accept Summary     [place] [address] [place]  DATE OF TRANSFER: 4/16/2022       PATIENT ID: Thomas Mariee  MRN: [id number]   YOB: 1940    PRIMARY CARE PROVIDER: Krystina Yen MD   DATE OF ADMISSION: 4/15/2022 11:46 AM    ATTENDING PHYSICIAN: NEWTON Vela  CONSULTATIONS:   IP CONSULT TO GENERAL SURGERY  IP CONSULT TO CARDIOLOGY    PROCEDURES/SURGERIES:   * No surgery found *    REASON FOR ADMISSION: <principal problem not specified>     HOSPITAL PROBLEM LIST:  Patient Active Problem List   Diagnosis Code    Family history of colon cancer requiring screening colonoscopy Z80.0    Dyspepsia R10.13    Diarrhea X27.6    Periumbilical pain Z64.64    Abnormal histology findings R89.7    History of gastric ulcer Z87.11    Liver hemorrhage K76.89         Brief HPI and Hospital Course:    From critical care HPI \" 80 y.o. female with a PMH of cirrhosis, PUD and HTN who underwent a transjugular liver biopsy on 4/12. She is followed by Dr Jaleel Cotton from GI. She presented to the ED on 4/15 with acute onset chest pain that progressed to epigastric/abdominal pain. BP 82/40 HR 42. CTA abdomen and pelvis performed that revealed \"active intrahepatic arterial contrast extravasation/bleeding, with surrounding intraparenchymal hematoma. There is large quantity hemoperitoneum. \"   She was taken to IR and hepatic arteriogram demonstrated large pseudoaneurysm with active hemorrhage off the mid R hepatic segment artery. This was successfully embolized with coils. She received a bolus of albumin. \"  Patient has been deemed appropriate for downgrade by primary team.  Hospitalist note:  Chart reviewed, patient examined at bedside. Patient endorses pain with movement, currently receiving fentanyl for pain which she reports manages her pain effectively. BP is also elevated, home metoprolol has been resumed.    Appropriate for downgrade at this time, monitor closely low threshold for upgrade. Assessment and Plan:    Hepatic hemorrhage s/p angiogram with coiling  -CT ABD/PELV: demonstrated \"active intrahepatic arterial contrast extravasation/bleeding, with surrounding intraparenchymal hematoma. There is large quantity hemoperitoneum\". - Daily CBC  - Transfuse to keep Hgb >7  - Fluid resuscitation in addition to PRBC if develops hypotension  -General Surgery following, appreciate recs    HTN  -Resume home metoprolol  -As needed hydralazine for SBP greater than 180     PUD: .  -Continue Pepcid     Cirrhosis:  - No hx of HE, not on home lactulose or rifaximin  - Follow up as outpt       PHYSICAL EXAMINATION:  Visit Vitals  BP (!) 168/72   Pulse 100   Temp 100.2 °F (37.9 °C)   Resp 19   Ht 5' 4\" (1.626 m)   Wt 58 kg (127 lb 13.9 oz)   SpO2 97%   BMI 21.95 kg/m²       General:          Alert, cooperative, no distress  HEENT:           Atraumatic, MMM            Neck:               Supple, symmetrical,  thyroid: non tender  Lungs:             Clear to auscultation bilaterally. No Wheezing or Rhonchi. No rales. Heart:              Regular  rhythm,  No  murmur   No edema  Abdomen:       Soft, non-tender. Not distended. Bowel sounds normal  Extremities:     No cyanosis. No clubbing,  +2 distal pulses  Skin:                Not pale. Not Jaundiced  No rashes   Psych:             Not anxious or agitated. Neurologic:      Alert, moves all extremities, oriented X 3.      Labs:     Recent Labs     04/16/22  1540 04/16/22  0608   WBC 10.4 9.0   HGB 8.9* 7.9*   HCT 27.2* 24.3*    159     Recent Labs     04/16/22  0608 04/15/22  1742 04/15/22  1159    138 138   K 4.3 4.2 4.0    107 103   CO2 25 24 30   BUN 22* 14 15   CREA 0.67 0.67 0.74   * 136* 148*   CA 7.7* 7.5* 8.1*   MG 2.0 2.0  --    PHOS 3.8 3.6  --      Recent Labs     04/15/22  1159   *   AP 69   TBILI 0.5   TP 5.4*   ALB 2.7*   GLOB 2.7   LPSE 107 Recent Labs     04/15/22  1755   INR 1.3*   PTP 13.0*      No results for input(s): FE, TIBC, PSAT, FERR in the last 72 hours. No results found for: FOL, RBCF   No results for input(s): PH, PCO2, PO2 in the last 72 hours. No results for input(s): CPK, CKNDX, TROIQ in the last 72 hours.     No lab exists for component: CPKMB  No results found for: CHOL, CHOLX, CHLST, CHOLV, HDL, HDLP, LDL, LDLC, DLDLP, TGLX, TRIGL, TRIGP, CHHD, CHHDX  No results found for: GLUCPOC  No results found for: COLOR, APPRN, SPGRU, REFSG, YUSUF, PROTU, GLUCU, KETU, BILU, UROU, BILLIE, LEUKU, GLUKE, EPSU, BACTU, WBCU, RBCU, CASTS, UCRY      CODE STATUS:     X Full Code    DNR    Partial    Comfort Care       Signed:   NEWTON Vela  Date of Service:  4/16/2022  5:49 PM

## 2022-04-16 NOTE — PROGRESS NOTES
Care Management:    Transition of Care Plan:     · RUR: 12%  · Disposition: home with   · Transportation:     Met with patient and  in the room. Demographics confirmed: yes  Living Situation: Patient lives with her home on a 2 level home. She enters the home on the top level which is the main living area. They bottom level is a basement. DME: cane, walker  ADLs: independent  Pharmacy: [facility] in [facility],   Previous HH/SNF/rehab: Insurance: [employer]  Transportation:     Medicare pt has received, reviewed, and signed 1st IM letter informing them of their right to appeal the discharge. Signed copy has been placed on pt bedside chart. Reason for Admission:  Liver hemorrhage                   RUR Score:         12%            Plan for utilizing home health:    No needs likely. PCP: First and Last name:  Seth Yoon MD   Name of Practice:    Are you a current patient: Yes/No:    Approximate date of last visit: Patient has her new patient appointment on May 2nd or 3rd, 2022. Can you participate in a virtual visit with your PCP:                     Current Advanced Directive/Advance Care Plan: Full Code      Healthcare Decision Maker:             Primary Decision Maker: Lorena Aguilar - [phone]    Secondary Decision Maker: Saud Cisneros - Daughter - [phone]                  Transition of Care Plan:                      Care Management Interventions  PCP Verified by CM: Yes (Dr. Sunita Chueng)  Palliative Care Criteria Met (RRAT>21 & CHF Dx)?: No  Mode of Transport at Discharge: Other (see comment)  Transition of Care Consult (CM Consult):  Other  Discharge Durable Medical Equipment: No  Physical Therapy Consult: No  Occupational Therapy Consult: No  Speech Therapy Consult: No  Support Systems: Spouse/Significant Other  Confirm Follow Up Transport: Family  [employer] Information Provided?: No  Discharge Location  [facility]

## 2022-04-17 LAB
ANION GAP SERPL CALC-SCNC: 1 MMOL/L (ref 5–15)
BUN SERPL-MCNC: 19 MG/DL (ref 6–20)
BUN/CREAT SERPL: 34 (ref 12–20)
CALCIUM SERPL-MCNC: 8.4 MG/DL (ref 8.5–10.1)
CHLORIDE SERPL-SCNC: 105 MMOL/L (ref 97–108)
CO2 SERPL-SCNC: 30 MMOL/L (ref 21–32)
CREAT SERPL-MCNC: 0.56 MG/DL (ref 0.55–1.02)
ERYTHROCYTE [DISTWIDTH] IN BLOOD BY AUTOMATED COUNT: 12.7 % (ref 11.5–14.5)
GLUCOSE SERPL-MCNC: 117 MG/DL (ref 65–100)
HCT VFR BLD AUTO: 22.2 % (ref 35–47)
HGB BLD-MCNC: 7.5 G/DL (ref 11.5–16)
MCH RBC QN AUTO: 29.2 PG (ref 26–34)
MCHC RBC AUTO-ENTMCNC: 33.8 G/DL (ref 30–36.5)
MCV RBC AUTO: 86.4 FL (ref 80–99)
NRBC # BLD: 0 K/UL (ref 0–0.01)
NRBC BLD-RTO: 0 PER 100 WBC
PLATELET # BLD AUTO: 158 K/UL (ref 150–400)
PMV BLD AUTO: 10.4 FL (ref 8.9–12.9)
POTASSIUM SERPL-SCNC: 4.1 MMOL/L (ref 3.5–5.1)
RBC # BLD AUTO: 2.57 M/UL (ref 3.8–5.2)
SODIUM SERPL-SCNC: 136 MMOL/L (ref 136–145)
WBC # BLD AUTO: 13.8 K/UL (ref 3.6–11)

## 2022-04-17 PROCEDURE — 97530 THERAPEUTIC ACTIVITIES: CPT

## 2022-04-17 PROCEDURE — 74011000250 HC RX REV CODE- 250: Performed by: NURSE PRACTITIONER

## 2022-04-17 PROCEDURE — 74011250636 HC RX REV CODE- 250/636: Performed by: NURSE PRACTITIONER

## 2022-04-17 PROCEDURE — 36415 COLL VENOUS BLD VENIPUNCTURE: CPT

## 2022-04-17 PROCEDURE — 97161 PT EVAL LOW COMPLEX 20 MIN: CPT

## 2022-04-17 PROCEDURE — 80048 BASIC METABOLIC PNL TOTAL CA: CPT

## 2022-04-17 PROCEDURE — 65660000000 HC RM CCU STEPDOWN

## 2022-04-17 PROCEDURE — 74011250637 HC RX REV CODE- 250/637: Performed by: NURSE PRACTITIONER

## 2022-04-17 PROCEDURE — 74011250636 HC RX REV CODE- 250/636: Performed by: HOSPITALIST

## 2022-04-17 PROCEDURE — 85027 COMPLETE CBC AUTOMATED: CPT

## 2022-04-17 PROCEDURE — 94760 N-INVAS EAR/PLS OXIMETRY 1: CPT

## 2022-04-17 PROCEDURE — 99231 SBSQ HOSP IP/OBS SF/LOW 25: CPT | Performed by: SURGERY

## 2022-04-17 RX ORDER — MULTIVIT WITH MINERALS/HERBS
1 TABLET ORAL DAILY
COMMUNITY

## 2022-04-17 RX ORDER — FUROSEMIDE 10 MG/ML
20 INJECTION INTRAMUSCULAR; INTRAVENOUS ONCE
Status: COMPLETED | OUTPATIENT
Start: 2022-04-17 | End: 2022-04-17

## 2022-04-17 RX ADMIN — FAMOTIDINE 20 MG: 20 TABLET ORAL at 18:25

## 2022-04-17 RX ADMIN — METOPROLOL TARTRATE 25 MG: 25 TABLET, FILM COATED ORAL at 10:47

## 2022-04-17 RX ADMIN — FAMOTIDINE 20 MG: 20 TABLET ORAL at 10:47

## 2022-04-17 RX ADMIN — FUROSEMIDE 20 MG: 10 INJECTION, SOLUTION INTRAMUSCULAR; INTRAVENOUS at 10:47

## 2022-04-17 RX ADMIN — TRAZODONE HYDROCHLORIDE 50 MG: 50 TABLET ORAL at 22:31

## 2022-04-17 RX ADMIN — METOPROLOL TARTRATE 25 MG: 25 TABLET, FILM COATED ORAL at 18:25

## 2022-04-17 RX ADMIN — FENTANYL CITRATE 25 MCG: 0.05 INJECTION, SOLUTION INTRAMUSCULAR; INTRAVENOUS at 06:39

## 2022-04-17 NOTE — PROGRESS NOTES
Progress Note    Patient: Lilian Barker MRN: [id number]  SSN: [id number]    YOB: 1940  Age: 80 y.o. Sex: female      Admit Date: 4/15/2022    Hepatic hemorrahge    Subjective:     No acute surgical issues. Pt is doing okay. She is still reporting some abdominal pain particularly on the right side. Tolerating full liquids without nausea or vomiting. Hemoglobin is stable but trending down a bit. She is otherwise hemodynamically stable.       Objective:     Visit Vitals  BP (!) 154/65 (BP 1 Location: Right upper arm, BP Patient Position: Sitting)   Pulse 85   Temp 98.7 °F (37.1 °C)   Resp 23   Ht 5' 4\" (1.626 m)   Wt 130 lb 6.4 oz (59.1 kg)   SpO2 98%   BMI 22.38 kg/m²       Temp (24hrs), Av.7 °F (37.1 °C), Min:98 °F (36.7 °C), Max:100.2 °F (37.9 °C)        Physical Exam:    Gen:  NAD  Pulm:  Unlabored  Abd:  S/mildly distended/Moderate TTP    Recent Results (from the past 24 hour(s))   CBC W/O DIFF    Collection Time: 22  3:40 PM   Result Value Ref Range    WBC 10.4 3.6 - 11.0 K/uL    RBC 3.04 (L) 3.80 - 5.20 M/uL    HGB 8.9 (L) 11.5 - 16.0 g/dL    HCT 27.2 (L) 35.0 - 47.0 %    MCV 89.5 80.0 - 99.0 FL    MCH 29.3 26.0 - 34.0 PG    MCHC 32.7 30.0 - 36.5 g/dL    RDW 13.1 11.5 - 14.5 %    PLATELET 574 326 - 267 K/uL    MPV 10.5 8.9 - 12.9 FL    NRBC 0.0 0  WBC    ABSOLUTE NRBC 0.00 0.00 - 1.58 K/uL   METABOLIC PANEL, BASIC    Collection Time: 22  4:59 AM   Result Value Ref Range    Sodium 136 136 - 145 mmol/L    Potassium 4.1 3.5 - 5.1 mmol/L    Chloride 105 97 - 108 mmol/L    CO2 30 21 - 32 mmol/L    Anion gap 1 (L) 5 - 15 mmol/L    Glucose 117 (H) 65 - 100 mg/dL    BUN 19 6 - 20 MG/DL    Creatinine 0.56 0.55 - 1.02 MG/DL    BUN/Creatinine ratio 34 (H) 12 - 20      GFR est AA >60 >60 ml/min/1.73m2    GFR est non-AA >60 >60 ml/min/1.73m2    Calcium 8.4 (L) 8.5 - 10.1 MG/DL   CBC W/O DIFF    Collection Time: 22  4:59 AM   Result Value Ref Range    WBC 13.8 (H) 3.6 - 11.0 K/uL RBC 2.57 (L) 3.80 - 5.20 M/uL    HGB 7.5 (L) 11.5 - 16.0 g/dL    HCT 22.2 (L) 35.0 - 47.0 %    MCV 86.4 80.0 - 99.0 FL    MCH 29.2 26.0 - 34.0 PG    MCHC 33.8 30.0 - 36.5 g/dL    RDW 12.7 11.5 - 14.5 %    PLATELET 501 959 - 080 K/uL    MPV 10.4 8.9 - 12.9 FL    NRBC 0.0 0  WBC    ABSOLUTE NRBC 0.00 0.00 - 0.01 K/uL       Assessment:     Hospital Problems  Date Reviewed: 8/19/2021          Codes Class Noted POA    Liver hemorrhage ICD-10-CM: K76.89  ICD-9-CM: 573.8  4/15/2022 Unknown              Plan/Recommendations/Medical Decision Making:     - Hepatic hemorrhage:  No acute indication for operation. Successful coil embolization without evidence of ongoing bleeding.   Continue to monitor hemoglobin level and transfuse as indicated  - Advance to regular diet tolerated  - Pain control

## 2022-04-17 NOTE — PROGRESS NOTES
TRANSFER - OUT REPORT:    Verbal report given to RN Anusha(name) on Julian Simpler  being transferred to [facility]([id number]) for routine progression of care       Report consisted of patients Situation, Background, Assessment and   Recommendations(SBAR). Information from the following report(s) SBAR, Kardex, Procedure Summary, Intake/Output, MAR, Accordion and Recent Results was reviewed with the receiving nurse. Lines:   Peripheral IV 04/15/22 Left Antecubital (Active)   Site Assessment Clean, dry, & intact 04/16/22 2207   Phlebitis Assessment 0 04/16/22 2207   Infiltration Assessment 0 04/16/22 2207   Dressing Status Clean, dry, & intact 04/16/22 2207   Dressing Type Transparent;Tape 04/16/22 2207   Hub Color/Line Status Flushed 04/16/22 2207   Action Taken Open ports on tubing capped 04/16/22 1600   Alcohol Cap Used Yes 04/16/22 1600       Peripheral IV 04/16/22 Anterior;Right Forearm (Active)   Site Assessment Clean, dry, & intact 04/16/22 2207   Phlebitis Assessment 0 04/16/22 2207   Infiltration Assessment 0 04/16/22 2207   Dressing Status Clean, dry, & intact 04/16/22 2207   Dressing Type Transparent;Tape 04/16/22 2207   Hub Color/Line Status Flushed 04/16/22 2207   Action Taken Open ports on tubing capped 04/16/22 1600   Alcohol Cap Used Yes 04/16/22 1600        Opportunity for questions and clarification was provided.       Patient transported with:   O2 @ 2 liters  Tech

## 2022-04-17 NOTE — CONSULTS
Brief consult note:  I was called by Dr Jerome Heredia to see this patient in the ED when her chest CT reported significant narrowing of the LV OT. Vitaly Albarran arrived in the ED, she had already gone to surgery. She was here because she developed a large hemoperitoneum after a transjugular liver biopsy. The LVOT narrowing was an incidental finding. She knows about a heart murmur that she has chronically taken metoprolol for. She is minimally symptomatic from this. She does not have exertional chest pain but does have exertional dyspnea and occasional palpitations. No syncope, orthopnea or PND. If the patient is here tomorrow, we can have her get an echocardiogram to assess. If not, I can follow up with her in our office. Discussed with the patient who agrees.    Thank you for this referral.  Dinorah Rdz MD  Interventional Cardiology   [facility]

## 2022-04-17 NOTE — PROGRESS NOTES
Bedside shift change report given to Elina Sanchez RN (oncoming nurse) by Fredy Escobedo (offgoing nurse). Report included the following information SBAR, Kardex, Intake/Output, Recent Results and Cardiac Rhythm NSR.

## 2022-04-17 NOTE — PROGRESS NOTES
Vitals:    04/17/22 1439 04/17/22 1456 04/17/22 1500 04/17/22 1504   BP: (!) 158/67 (!) 158/68 135/64 (!) 154/65   BP 1 Location: Right upper arm Right upper arm Right upper arm Right upper arm   BP Patient Position: Semi fowlers Sitting Standing Sitting  Comment: up to chair   Pulse: 90 93 98 85   Temp:       Resp:       Height:       Weight:       SpO2: on room air 94% 96% 98% 98%

## 2022-04-17 NOTE — PROGRESS NOTES
Occupational Therapy  4/17/2022    Orders received and chart reviewed. Pt with HGB outside of therapeutic range. Will hold OT evaluation until medically appropriate.      Thank you,   Sandra Terrell, OTR/L

## 2022-04-17 NOTE — PROGRESS NOTES
Hospitalist Progress Note      Hospital summary: 80 y.o lady w/ cirrhosis, PUD, HTN, who presented with liver hemorrhage after a transjugular liver biopsy. Assessment/Plan:    Hepatic hemorrhage s/p angiogram with coiling. Acute blood loss anemia. -CT ABD/PELV: demonstrated \"active intrahepatic arterial contrast extravasation/bleeding, with surrounding intraparenchymal hematoma. There is large quantity hemoperitoneum\". -monitor H/H  -General Surgery following, appreciate recs     HTN  -continue home metoprolol  -As needed hydralazine for SBP greater than 180     PUD:   -Continue Pepcid     Cirrhosis:  -No hx of HE, not on home lactulose or rifaximin  -developing some edema, will give a one time dose of IV furosemide and reassess    PT/OT evals    Code status: full  DVT prophylaxis: SCDs  Disposition: TBD  ----------------------------------------------    CC: abdominal pain    S: reports some RUQ pain, not worsening, no n/v/d or dyspnea     Review of Systems:  Pertinent items are noted in HPI.     O:  Visit Vitals  BP (!) 152/78 (BP 1 Location: Right upper arm, BP Patient Position: At rest)   Pulse 93   Temp 98 °F (36.7 °C)   Resp 27   Ht 5' 4\" (1.626 m)   Wt 59.1 kg (130 lb 6.4 oz)   SpO2 93%   BMI 22.38 kg/m²       PHYSICAL EXAM:  Gen: NAD, non-toxic  HEENT: anicteric sclerae, pale conjunctiva  Neck: supple, trachea midline, no adenopathy  Heart: RRR, no MRG, no JVD, trace b/l LE edema  Lungs: CTA b/l, non-labored respirations  Abd: soft, RUQ tenderness, ND, BS+  Extr: warm  Skin: dry, no rash  Neuro: CN II-XII grossly intact, normal speech, moves all extremities  Psych: normal mood, appropriate affect      Intake/Output Summary (Last 24 hours) at 4/17/2022 0828  Last data filed at 4/17/2022 5846  Gross per 24 hour   Intake 540 ml   Output 1450 ml   Net -910 ml        Recent labs & imaging reviewed:  Recent Results (from the past 24 hour(s))   CBC W/O DIFF    Collection Time: 04/16/22  3:40 PM   Result Value Ref Range    WBC 10.4 3.6 - 11.0 K/uL    RBC 3.04 (L) 3.80 - 5.20 M/uL    HGB 8.9 (L) 11.5 - 16.0 g/dL    HCT 27.2 (L) 35.0 - 47.0 %    MCV 89.5 80.0 - 99.0 FL    MCH 29.3 26.0 - 34.0 PG    MCHC 32.7 30.0 - 36.5 g/dL    RDW 13.1 11.5 - 14.5 %    PLATELET 528 759 - 317 K/uL    MPV 10.5 8.9 - 12.9 FL    NRBC 0.0 0  WBC    ABSOLUTE NRBC 0.00 0.00 - 8.84 K/uL   METABOLIC PANEL, BASIC    Collection Time: 04/17/22  4:59 AM   Result Value Ref Range    Sodium 136 136 - 145 mmol/L    Potassium 4.1 3.5 - 5.1 mmol/L    Chloride 105 97 - 108 mmol/L    CO2 30 21 - 32 mmol/L    Anion gap 1 (L) 5 - 15 mmol/L    Glucose 117 (H) 65 - 100 mg/dL    BUN 19 6 - 20 MG/DL    Creatinine 0.56 0.55 - 1.02 MG/DL    BUN/Creatinine ratio 34 (H) 12 - 20      GFR est AA >60 >60 ml/min/1.73m2    GFR est non-AA >60 >60 ml/min/1.73m2    Calcium 8.4 (L) 8.5 - 10.1 MG/DL   CBC W/O DIFF    Collection Time: 04/17/22  4:59 AM   Result Value Ref Range    WBC 13.8 (H) 3.6 - 11.0 K/uL    RBC 2.57 (L) 3.80 - 5.20 M/uL    HGB 7.5 (L) 11.5 - 16.0 g/dL    HCT 22.2 (L) 35.0 - 47.0 %    MCV 86.4 80.0 - 99.0 FL    MCH 29.2 26.0 - 34.0 PG    MCHC 33.8 30.0 - 36.5 g/dL    RDW 12.7 11.5 - 14.5 %    PLATELET 934 450 - 332 K/uL    MPV 10.4 8.9 - 12.9 FL    NRBC 0.0 0  WBC    ABSOLUTE NRBC 0.00 0.00 - 0.01 K/uL     Recent Labs     04/17/22  0459 04/16/22  1540   WBC 13.8* 10.4   HGB 7.5* 8.9*   HCT 22.2* 27.2*    169     Recent Labs     04/17/22  0459 04/16/22  0608 04/15/22  1742    137 138   K 4.1 4.3 4.2    107 107   CO2 30 25 24   BUN 19 22* 14   CREA 0.56 0.67 0.67   * 102* 136*   CA 8.4* 7.7* 7.5*   MG  --  2.0 2.0   PHOS  --  3.8 3.6     Recent Labs     04/15/22  1159   *   AP 69   TBILI 0.5   TP 5.4*   ALB 2.7*   GLOB 2.7   LPSE 107     Recent Labs     04/15/22  1755   INR 1.3*   PTP 13.0*      No results for input(s): FE, TIBC, PSAT, FERR in the last 72 hours.    No results found for: FOL, RBCF   No results for input(s): PH, PCO2, PO2 in the last 72 hours. No results for input(s): CPK, CKNDX, TROIQ in the last 72 hours.     No lab exists for component: CPKMB  No results found for: CHOL, CHOLX, CHLST, CHOLV, HDL, HDLP, LDL, LDLC, DLDLP, TGLX, TRIGL, TRIGP, CHHD, CHHDX  No results found for: GLUCPOC  No results found for: COLOR, APPRN, SPGRU, REFSG, YUSUF, PROTU, GLUCU, KETU, BILU, UROU, BILLIE, LEUKU, GLUKE, EPSU, BACTU, WBCU, RBCU, CASTS, UCRY    Med list reviewed  Current Facility-Administered Medications   Medication Dose Route Frequency    lidocaine 4 % patch 1 Patch  1 Patch TransDERmal Q24H    acetaminophen (TYLENOL) tablet 650 mg  650 mg Oral Q4H PRN    fentaNYL citrate (PF) injection 25 mcg  25 mcg IntraVENous Q2H PRN    traZODone (DESYREL) tablet 50 mg  50 mg Oral QHS    famotidine (PEPCID) tablet 20 mg  20 mg Oral BID    metoprolol tartrate (LOPRESSOR) tablet 25 mg  25 mg Oral BID    hydrALAZINE (APRESOLINE) 20 mg/mL injection 10 mg  10 mg IntraVENous Q6H PRN    0.9% sodium chloride infusion 250 mL  250 mL IntraVENous PRN    iopamidoL (ISOVUE 300) 61 % contrast injection 50 mL  50 mL IntraCATHeter RAD PRN    0.9% sodium chloride infusion 250 mL  250 mL IntraVENous PRN     Facility-Administered Medications Ordered in Other Encounters   Medication Dose Route Frequency    0.9% sodium chloride infusion 250 mL  250 mL IntraVENous PRN       Care Plan discussed with:  Patient/Family    Kasey Lorenzo MD  Internal Medicine  Date of Service: 4/17/2022

## 2022-04-17 NOTE — PROGRESS NOTES
Problem: Mobility Impaired (Adult and Pediatric)  Goal: *Acute Goals and Plan of Care (Insert Text)  Description: FUNCTIONAL STATUS PRIOR TO ADMISSION: Patient was independent and active without use of DME. When asked she endorsed 3-4 falls in the last 12 months. HOME SUPPORT PRIOR TO ADMISSION: The patient lived with her  but did not require assist. Of note he uses a walker. Physical Therapy Goals  Initiated 4/17/2022  1. Patient will move from supine to sit and sit to supine , scoot up and down, and roll side to side in bed with independence within 7 day(s). 2.  Patient will transfer from bed to chair and chair to bed with independence using the least restrictive device within 7 day(s). 3.  Patient will perform sit to stand with independence within 7 day(s). 4.  Patient will ambulate with independence for 150 feet with the least restrictive device within 7 day(s). 5.  Patient will ascend/descend 4 stairs with one handrail(s) with modified independence within 7 day(s). Outcome: Progressing Towards Goal   PHYSICAL THERAPY EVALUATION  Patient: Magdy Gurrola (89 y.o. female)  Date: 4/17/2022  Primary Diagnosis: Liver hemorrhage [K76.89]        Precautions:   Fall    ASSESSMENT  Based on the objective data described below, the patient presents with orthostatic hypotension with progression of mobility (in standing) and was symptomatic but her BP did recover once up to the chair, see chart below. In addition, she was slightly unsteady in standing. Anticipate steady gains with mobility when no longer staying in the bed. Encouraged her to make it a goal for meals up to the chair.     Vitals:     04/17/22 1439 04/17/22 1456 04/17/22 1500 04/17/22 1504   BP: (!) 158/67 (!) 158/68 135/64 (!) 154/65   BP 1 Location: Right upper arm Right upper arm Right upper arm Right upper arm   BP Patient Position: Semi fowlers Sitting Standing Sitting  Comment: up to chair   Pulse: 90 93 98 85   Temp:           Resp: Height:           Weight:           SpO2: on room air 94% 96% 98% 98%                  Current Level of Function Impacting Discharge (mobility/balance): provided contact guard at most.    Functional Outcome Measure: The patient scored 0 on the TUG outcome measure (unable to complete) which is indicative of high fall risk. .      Other factors to consider for discharge: her  uses a walker himself, pt will need to be independent/mod I. Patient will benefit from skilled therapy intervention to address the above noted impairments. PLAN :  Recommendations and Planned Interventions: bed mobility training, transfer training, gait training, therapeutic exercises, patient and family training/education, and therapeutic activities      Frequency/Duration: Patient will be followed by physical therapy:  5 times a week to address goals. Recommendation for discharge: (in order for the patient to meet his/her long term goals)  Physical therapy at least 2 days/week in the home vs none pending progress    This discharge recommendation:  A follow-up discussion with the attending provider and/or case management is planned    IF patient discharges home will need the following DME: patient owns DME required for discharge         SUBJECTIVE:   Patient stated I felt unsteady.     OBJECTIVE DATA SUMMARY:   Consult received, chart reviewed, pt cleared by nursing  HISTORY:    Past Medical History:   Diagnosis Date    Abnormal histology findings 5/28/2020    ?  Celiac disease 2019    Arthritis     Chronic pain     arthritis/chronic headache daily    Diarrhea 2/28/2019    Family history of colon cancer requiring screening colonoscopy 1/29/2013    GERD (gastroesophageal reflux disease)     High cholesterol     History of gastric ulcer 7/23/2020    Hypertension     Non-alcoholic fatty liver disease 4525    Periumbilical pain 6/32/0934    Psychiatric disorder     ANXIETY AND DEPRESSION    PUD (peptic ulcer disease) Past Surgical History:   Procedure Laterality Date    COLONOSCOPY N/A 9/20/2018    COLONOSCOPY performed by Kimo Delatorre MD at [facility] ENDOSCOPY    COLONOSCOPY N/A 7/23/2020    COLONOSCOPY performed by Lc Mcfarlane MD at [facility] ENDOSCOPY    COLONOSCOPY,DIAGNOSTIC  7/23/2020         COLORECTAL SCRN; HI RISK IND  9/20/2018         HX APPENDECTOMY      HX DILATION AND CURETTAGE      miscarriage    HX TONSILLECTOMY      IR BX TRANSCATHETER  4/12/2022    IR OCCL TXCATH HEMMORAGE W SI  4/15/2022    ME CHEST SURGERY PROCEDURE UNLISTED  2000    removal of benign cyst from right lung    UPPER GI ENDOSCOPY,BIOPSY  9/20/2018         UPPER GI ENDOSCOPY,BIOPSY  2/28/2019         UPPER GI ENDOSCOPY,BIOPSY  5/28/2020         UPPER GI ENDOSCOPY,BIOPSY  7/23/2020         UPPER GI ENDOSCOPY,BIOPSY  3/5/2021         UPPER GI ENDOSCOPY,BIOPSY  8/19/2021         UPPER GI ENDOSCOPY,W/DILAT,GASTRIC OUT  2/28/2019            Personal factors and/or comorbidities impacting plan of care: see medical history    Home Situation  Home Environment: Private residence  # Steps to Enter: 4  Rails to Enter: Yes  Hand Rails : Left  Wheelchair Ramp: No  One/Two Story Residence: Two story, live on 1st floor  Living Alone: No  Support Systems: Spouse/Significant Other  Patient Expects to be Discharged to[de-identified] Home  Current DME Used/Available at Home: Cane, straight,Walker, rolling    EXAMINATION/PRESENTATION/DECISION MAKING:   Critical Behavior:  Neurologic State: Alert  Orientation Level: Oriented X4  Cognition: Follows commands     Hearing: Auditory  Auditory Impairment: None  Skin:  refer to MD and nursing notes  Edema: none noted (wearing knee high compression garments reports edema at baseline.   Range Of Motion:  AROM: Generally decreased, functional                       Strength:    Strength: Generally decreased, functional                    Tone & Sensation:                  Sensation: Intact               Coordination:     Vision:      Functional Mobility:  Bed Mobility:     Supine to Sit: Supervision; Adaptive equipment;Bed Modified        Transfers:  Sit to Stand: Contact guard assistance  Stand to Sit: Contact guard assistance        Bed to Chair: Contact guard assistance              Balance:   Sitting: Intact; Without support  Standing: Impaired  Standing - Static: Good  Standing - Dynamic : Good  Ambulation/Gait Training:                                                         Stairs: Therapeutic Exercises:       Functional Measure:  Timed up and go:    Timed Get Up And Go Test: 0 (unable to complete)       < than 10 seconds=Normal  Greater then 13.5 seconds (in elderly)=Increased fall risk   Moises Mcneill [place] M. Predicting the probability for falls in community dwelling older adults using the Timed Up and Go Test. Phys Ther. 2000;80:[id number]. Physical Therapy Evaluation Charge Determination   History Examination Presentation Decision-Making   MEDIUM  Complexity : 1-2 comorbidities / personal factors will impact the outcome/ POC  MEDIUM Complexity : 3 Standardized tests and measures addressing body structure, function, activity limitation and / or participation in recreation  MEDIUM Complexity : Evolving with changing characteristics  LOW Complexity : FOTO score of       Based on the above components, the patient evaluation is determined to be of the following complexity level: LOW     Pain Rating:  Not rated    Activity Tolerance:   signs and symptoms of orthostatic hypotension    After treatment patient left in no apparent distress:   Sitting in chair, Call bell within reach, and Caregiver / family present    COMMUNICATION/EDUCATION:   The patients plan of care was discussed with: Registered nurse. Fall prevention education was provided and the patient/caregiver indicated understanding., Patient/family have participated as able in goal setting and plan of care. , and Patient/family agree to work toward stated goals and plan of care.     Thank you for this referral.  Dejah Cavazos   Time Calculation: 37 mins

## 2022-04-18 ENCOUNTER — APPOINTMENT (OUTPATIENT)
Dept: NON INVASIVE DIAGNOSTICS | Age: 82
DRG: 907 | End: 2022-04-18
Attending: INTERNAL MEDICINE
Payer: MEDICARE

## 2022-04-18 LAB
ANION GAP SERPL CALC-SCNC: 4 MMOL/L (ref 5–15)
BUN SERPL-MCNC: 15 MG/DL (ref 6–20)
BUN/CREAT SERPL: 29 (ref 12–20)
CALCIUM SERPL-MCNC: 8.3 MG/DL (ref 8.5–10.1)
CHLORIDE SERPL-SCNC: 102 MMOL/L (ref 97–108)
CO2 SERPL-SCNC: 30 MMOL/L (ref 21–32)
CREAT SERPL-MCNC: 0.52 MG/DL (ref 0.55–1.02)
ECHO AO ROOT DIAM: 2.8 CM
ECHO AO ROOT INDEX: 1.77 CM/M2
ECHO AR MAX VEL PISA: 5.2 M/S
ECHO AV PEAK GRADIENT: 13 MMHG
ECHO AV PEAK VELOCITY: 1.8 M/S
ECHO AV REGURGITANT PHT: 472.7 MILLISECOND
ECHO AV VELOCITY RATIO: 0.94
ECHO EST RA PRESSURE: 12 MMHG
ECHO LA DIAMETER INDEX: 2.59 CM/M2
ECHO LA DIAMETER: 4.1 CM
ECHO LA TO AORTIC ROOT RATIO: 1.46
ECHO LV E' LATERAL VELOCITY: 8 CM/S
ECHO LV E' SEPTAL VELOCITY: 9 CM/S
ECHO LV FRACTIONAL SHORTENING: 34 % (ref 28–44)
ECHO LV INTERNAL DIMENSION DIASTOLE INDEX: 2.41 CM/M2
ECHO LV INTERNAL DIMENSION DIASTOLIC: 3.8 CM (ref 3.9–5.3)
ECHO LV INTERNAL DIMENSION SYSTOLIC INDEX: 1.58 CM/M2
ECHO LV INTERNAL DIMENSION SYSTOLIC: 2.5 CM
ECHO LV IVSD: 0.7 CM (ref 0.6–0.9)
ECHO LV MASS 2D: 78.8 G (ref 67–162)
ECHO LV MASS INDEX 2D: 49.9 G/M2 (ref 43–95)
ECHO LV POSTERIOR WALL DIASTOLIC: 0.8 CM (ref 0.6–0.9)
ECHO LV RELATIVE WALL THICKNESS RATIO: 0.42
ECHO LVOT PEAK GRADIENT: 11 MMHG
ECHO LVOT PEAK VELOCITY: 1.7 M/S
ECHO MV A VELOCITY: 0.92 M/S
ECHO MV AREA PHT: 3.4 CM2
ECHO MV E DECELERATION TIME (DT): 223.6 MS
ECHO MV E VELOCITY: 0.74 M/S
ECHO MV E/A RATIO: 0.8
ECHO MV E/E' LATERAL: 9.25
ECHO MV E/E' RATIO (AVERAGED): 8.74
ECHO MV E/E' SEPTAL: 8.22
ECHO MV PRESSURE HALF TIME (PHT): 64.8 MS
ECHO PV MAX VELOCITY: 0.6 M/S
ECHO PV PEAK GRADIENT: 2 MMHG
ECHO RIGHT VENTRICULAR SYSTOLIC PRESSURE (RVSP): 44 MMHG
ECHO RV FREE WALL PEAK S': 15 CM/S
ECHO RV INTERNAL DIMENSION: 5.1 CM
ECHO RV TAPSE: 2.3 CM (ref 1.7–?)
ECHO TV REGURGITANT MAX VELOCITY: 2.82 M/S
ECHO TV REGURGITANT PEAK GRADIENT: 32 MMHG
ERYTHROCYTE [DISTWIDTH] IN BLOOD BY AUTOMATED COUNT: 12.8 % (ref 11.5–14.5)
GLUCOSE SERPL-MCNC: 111 MG/DL (ref 65–100)
HCT VFR BLD AUTO: 20.7 % (ref 35–47)
HGB BLD-MCNC: 7 G/DL (ref 11.5–16)
MCH RBC QN AUTO: 29.5 PG (ref 26–34)
MCHC RBC AUTO-ENTMCNC: 33.8 G/DL (ref 30–36.5)
MCV RBC AUTO: 87.3 FL (ref 80–99)
NRBC # BLD: 0 K/UL (ref 0–0.01)
NRBC BLD-RTO: 0 PER 100 WBC
PLATELET # BLD AUTO: 168 K/UL (ref 150–400)
PMV BLD AUTO: 10.7 FL (ref 8.9–12.9)
POTASSIUM SERPL-SCNC: 3.8 MMOL/L (ref 3.5–5.1)
RBC # BLD AUTO: 2.37 M/UL (ref 3.8–5.2)
SODIUM SERPL-SCNC: 136 MMOL/L (ref 136–145)
WBC # BLD AUTO: 13.7 K/UL (ref 3.6–11)

## 2022-04-18 PROCEDURE — 36415 COLL VENOUS BLD VENIPUNCTURE: CPT

## 2022-04-18 PROCEDURE — 97165 OT EVAL LOW COMPLEX 30 MIN: CPT

## 2022-04-18 PROCEDURE — 74011250637 HC RX REV CODE- 250/637: Performed by: NURSE PRACTITIONER

## 2022-04-18 PROCEDURE — 74011250636 HC RX REV CODE- 250/636: Performed by: NURSE PRACTITIONER

## 2022-04-18 PROCEDURE — 85027 COMPLETE CBC AUTOMATED: CPT

## 2022-04-18 PROCEDURE — 93306 TTE W/DOPPLER COMPLETE: CPT

## 2022-04-18 PROCEDURE — 65270000046 HC RM TELEMETRY

## 2022-04-18 PROCEDURE — 80048 BASIC METABOLIC PNL TOTAL CA: CPT

## 2022-04-18 PROCEDURE — 77010033678 HC OXYGEN DAILY

## 2022-04-18 PROCEDURE — 97535 SELF CARE MNGMENT TRAINING: CPT

## 2022-04-18 RX ORDER — HYDRALAZINE HYDROCHLORIDE 20 MG/ML
20 INJECTION INTRAMUSCULAR; INTRAVENOUS
Status: DISCONTINUED | OUTPATIENT
Start: 2022-04-18 | End: 2022-04-26 | Stop reason: HOSPADM

## 2022-04-18 RX ADMIN — METOPROLOL TARTRATE 25 MG: 25 TABLET, FILM COATED ORAL at 17:09

## 2022-04-18 RX ADMIN — METOPROLOL TARTRATE 25 MG: 25 TABLET, FILM COATED ORAL at 08:02

## 2022-04-18 RX ADMIN — FAMOTIDINE 20 MG: 20 TABLET ORAL at 17:09

## 2022-04-18 RX ADMIN — FAMOTIDINE 20 MG: 20 TABLET ORAL at 08:02

## 2022-04-18 RX ADMIN — FENTANYL CITRATE 25 MCG: 0.05 INJECTION, SOLUTION INTRAMUSCULAR; INTRAVENOUS at 21:00

## 2022-04-18 RX ADMIN — TRAZODONE HYDROCHLORIDE 50 MG: 50 TABLET ORAL at 21:00

## 2022-04-18 NOTE — PROGRESS NOTES
Bedside and Verbal shift change report given to [address] [place] [address] KENNETH Leon (oncoming nurse) by Judy Gallagher RN (offgoing nurse). Report included the following information SBAR, Kardex, Intake/Output, MAR, Recent Results and Cardiac Rhythm .

## 2022-04-18 NOTE — PROGRESS NOTES
Problem: Self Care Deficits Care Plan (Adult)  Goal: *Acute Goals and Plan of Care (Insert Text)  Description: FUNCTIONAL STATUS PRIOR TO ADMISSION: Patient was independent and active without use of DME, was highly independent for basic and instrumental ADLs. HOME SUPPORT: The patient lived with her spouse (who is unable to physically assist d/t limitations of his own) but did not require assist.    Occupational Therapy Goals  Initiated 4/18/2022  1. Patient will perform grooming at the sink with supervision/set-up within 7 day(s). 2.  Patient will perform bathing with supervision/set-up within 7 day(s). 3.  Patient will perform upper and lower body dressing with supervision/set-up within 7 day(s). 4.  Patient will perform toilet transfers with supervision/set-up within 7 day(s). 5.  Patient will perform all aspects of toileting with supervision/set-up within 7 day(s). 6.  Patient will participate in upper extremity therapeutic exercise/activities with supervision/set-up for 10 minutes with rest breaks PRN within 7 day(s). 7.  Patient will utilize energy conservation techniques during functional activities with verbal and visual cues within 7 day(s). Outcome: Not Met     OCCUPATIONAL THERAPY EVALUATION  Patient: Divine Amaya (88 y.o. female)  Date: 4/18/2022  Primary Diagnosis: Liver hemorrhage [K76.89]        Precautions: Fall    ASSESSMENT  Based on the objective data described below, the patient presents with decreased balance, cardiopulmonary endurance, strength, AROM, coordination, safety awareness, insight into deficits, and volition s/p admission for acute intrahepatic hemorrhage, s/p embolization & coiling 4/15/22.  At baseline, she is highly IND, lives with her  who has physical limitations of his own (unable to assist), drives, etc. She now presents far from her baseline, dependent on 2L NC with all activity (none at baseline) for stable saturations with frequent seated rest breaks with bathroom mobility and toileting alone with up to Mod A. Educated on increasing OOB mobility & ADLs with staff, meals in the chair, and exercises as able as she is globally weak, somewhat self-limiting, now requiring supplemental oxygen. BP stable with all positional changes, SBP 160s with all activity. Given her deficits, recommend SNF vs HHOT if she makes marked progress during her hospitalization, she has limited support as her  is unable to help and her children are workign and/or out of the area. Current Level of Function Impacting Discharge (ADLs/self-care): Min-Mod A for ADLs, Min A for limited OOB mobility on 2L NC    Functional Outcome Measure: The patient scored Total: 45/100 on the Barthel Index outcome measure which is indicative of being partially dependent in basic self-care. Other factors to consider for discharge: fall risk, constant physical assistance, requiring supplemental O2, lack of assist at home     Patient will benefit from skilled therapy intervention to address the above noted impairments. PLAN :  Recommendations and Planned Interventions: self care training, functional mobility training, therapeutic exercise, balance training, therapeutic activities, endurance activities, patient education, home safety training, and family training/education    Frequency/Duration: Patient will be followed by occupational therapy 5 times a week to address goals. Recommendation for discharge: (in order for the patient to meet his/her long term goals)  Therapy up to 5 days/week in SNF setting   If d/c home, recommend HHOT and increased assistance    This discharge recommendation:  Has been made in collaboration with the attending provider and/or case management    IF patient discharges home will need the following DME: To be determined (TBD)         SUBJECTIVE:   Patient stated I can't make it to the chair, I've got to get back in the bed.     OBJECTIVE DATA SUMMARY: HISTORY:   Past Medical History:   Diagnosis Date    Abnormal histology findings 5/28/2020    ?  Celiac disease 2019    Arthritis     Chronic pain     arthritis/chronic headache daily    Diarrhea 2/28/2019    Family history of colon cancer requiring screening colonoscopy 1/29/2013    GERD (gastroesophageal reflux disease)     High cholesterol     History of gastric ulcer 7/23/2020    Hypertension     Non-alcoholic fatty liver disease 1911    Periumbilical pain 2/29/2072    Psychiatric disorder     ANXIETY AND DEPRESSION    PUD (peptic ulcer disease)      Past Surgical History:   Procedure Laterality Date    COLONOSCOPY N/A 9/20/2018    COLONOSCOPY [facility] Odilon [facility]vaez [facility] at [facility] ENDOSCOPY    COLONOSCOPY N/A 7/23/2020    COLONOSCOPY performed by Trixie Oswald MD at [facility] ENDOSCOPY    COLONOSCOPY,DIAGNOSTIC  7/23/2020         COLORECTAL SCRN; HI RISK IND  9/20/2018         HX APPENDECTOMY      HX DILATION AND CURETTAGE      miscarriage    HX TONSILLECTOMY      IR BX TRANSCATHETER  4/12/2022    IR OCCL TXCATH HEMMORAGE W SI  4/15/2022    AL CHEST SURGERY PROCEDURE UNLISTED  2000    removal of benign cyst from right lung    UPPER GI ENDOSCOPY,BIOPSY  9/20/2018         UPPER GI ENDOSCOPY,BIOPSY  2/28/2019         UPPER GI ENDOSCOPY,BIOPSY  5/28/2020         UPPER GI ENDOSCOPY,BIOPSY  7/23/2020         UPPER GI ENDOSCOPY,BIOPSY  3/5/2021         UPPER GI ENDOSCOPY,BIOPSY  8/19/2021         UPPER GI ENDOSCOPY,W/DILAT,GASTRIC OUT  2/28/2019            Expanded or extensive additional review of patient history:     Home Situation  Home Environment: Private residence  # Steps to Enter: 4  Rails to Enter: Yes  Hand Rails : Left  Wheelchair Ramp: No  One/Two Story Residence: Two story  # of Interior Steps: 12  Interior Rails: Left  Living Alone: No  Support Systems: Spouse/Significant Other  Patient Expects to be Discharged to[de-identified] Home  Current DME Used/Available at Home: [address], [place], straight,Shower chair,Walker, rolling  Tub or Shower Type: Shower    Hand dominance: Right    EXAMINATION OF PERFORMANCE DEFICITS:  Cognitive/Behavioral Status:  Neurologic State: Alert  Orientation Level: Oriented X4  Cognition: Appropriate for age attention/concentration; Follows commands  Perception: Appears intact  Perseveration: No perseveration noted  Safety/Judgement: Awareness of environment;Decreased awareness of need for assistance;Decreased awareness of need for safety;Decreased insight into deficits    Skin: Appears intact    Edema: None    Hearing: Auditory  Auditory Impairment: None    Vision/Perceptual:    Tracking: Able to track stimulus in all quadrants w/o difficulty                      Acuity: Within Defined Limits    Corrective Lenses: Glasses    Range of Motion:  AROM: Generally decreased, functional  PROM: Within functional limits                      Strength:  Strength: Generally decreased, functional                Coordination:  Coordination: Generally decreased, functional  Fine Motor Skills-Upper: Left Intact; Right Intact    Gross Motor Skills-Upper: Left Intact; Right Intact    Tone & Sensation:  Tone: Normal  Sensation: Intact                      Balance:  Sitting: Intact  Standing: Impaired; Without support  Standing - Static: Fair;Constant support  Standing - Dynamic : Fair;Constant support    Functional Mobility and Transfers for ADLs:  Bed Mobility:  Supine to Sit: Stand-by assistance; Additional time  Scooting: Stand-by assistance    Transfers:  Sit to Stand: Minimum assistance  Stand to Sit: Minimum assistance;Contact guard assistance  Bathroom Mobility: Minimum assistance  Toilet Transfer : Minimum assistance  Assistive Device : Gait Belt    ADL Assessment:  Feeding: Setup    Oral Facial Hygiene/Grooming: Minimum assistance    Bathing: Moderate assistance    Upper Body Dressing: Setup    Lower Body Dressing: Moderate assistance    Toileting:  Moderate assistance                ADL Intervention and task modifications:       Grooming  Grooming Assistance: Set-up  Position Performed: Seated edge of bed (unable to tolerate standing d/t SOB)  Washing Hands: Set-up  Cues: Verbal cues provided;Visual cues provided                   Lower Body Dressing Assistance  Dressing Assistance: Moderate assistance  Protective Undergarmet: Moderate assistance  Socks: Stand-by assistance  Leg Crossed Method Used: Yes  Position Performed: Seated edge of bed;Standing  Cues: Physical assistance; Tactile cues provided;Verbal cues provided;Visual cues provided    Toileting  Toileting Assistance: Moderate assistance  Bladder Hygiene: Stand-by assistance  Bowel Hygiene: Minimum assistance  Clothing Management: Moderate assistance  Cues: Tactile cues provided;Verbal cues provided;Visual cues provided  Adaptive Equipment: Grab bars    Cognitive Retraining  Safety/Judgement: Awareness of environment;Decreased awareness of need for assistance;Decreased awareness of need for safety;Decreased insight into deficits      Functional Measure:    Barthel Index:  Bathin  Bladder: 5  Bowels: 5  Groomin  Dressin  Feeding: 10  Mobility: 0  Stairs: 0  Toilet Use: 5  Transfer (Bed to Chair and Back): 10  Total: 45/100      The Barthel ADL Index: Guidelines  1. The index should be used as a record of what a patient does, not as a record of what a patient could do. 2. The main aim is to establish degree of independence from any help, physical or verbal, however minor and for whatever reason. 3. The need for supervision renders the patient not independent. 4. A patient's performance should be established using the best available evidence. Asking the patient, friends/relatives and nurses are the usual sources, but direct observation and common sense are also important. However direct testing is not needed. 5. Usually the patient's performance over the preceding 24-48 hours is important, but occasionally longer periods will be relevant.   6. Middle categories imply that the patient supplies over 50 per cent of the effort. 7. Use of aids to be independent is allowed. Score Interpretation (from [facility] 83)    Independent   60-79 Minimally independent   40-59 Partially dependent   20-39 Very dependent   <20 Totally dependent     -Naomi Lucio., Barthel, D.W. (1965). Functional evaluation: the Barthel Index. [address] ([address] [place] [address] (1997). The Barthel activities of daily living index: self-reporting versus actual performance in the old (> or = 75 years). Journal of [address] [place] [address] 45(7), 14 [facility], JBRENDAF, Erik Anne., Dee Dee Muller. (1999). Measuring the change in disability after inpatient rehabilitation; comparison of the responsiveness of the Barthel Index and Functional [place] Measure. Journal of Neurology, Neurosurgery, and Psychiatry, 66(4), 327-282. Marisela Vasquez, N.J.A, PRESLEY James, & Carol Faye MKevinA. (2004) Assessment of post-stroke quality of life in cost-effectiveness studies: The usefulness of the Barthel Index and the EuroQoL-5D. Quality of Life Research, 15, 171-37     Occupational Therapy Evaluation Charge Determination   History Examination Decision-Making   LOW Complexity : Brief history review  MEDIUM Complexity : 3-5 performance deficits relating to physical, cognitive , or psychosocial skils that result in activity limitations and / or participation restrictions MEDIUM Complexity : Patient may present with comorbidities that affect occupational performnce.  Miniml to moderate modification of tasks or assistance (eg, physical or verbal ) with assesment(s) is necessary to enable patient to complete evaluation       Based on the above components, the patient evaluation is determined to be of the following complexity level: LOW   Pain Rating:  None reported    Activity Tolerance:   Poor, desaturates with exertion and requires oxygen, requires frequent rest breaks, and observed SOB with activity    After treatment patient left in no apparent distress:    Supine in bed, Call bell within reach, Bed / chair alarm activated, and Side rails x 3    COMMUNICATION/EDUCATION:   The patients plan of care was discussed with: Registered nurse and Case management. Home safety education was provided and the patient/caregiver indicated understanding., Patient/family have participated as able in goal setting and plan of care. , and Patient/family agree to work toward stated goals and plan of care. This patients plan of care is appropriate for delegation to \[facility] [place] [facility]\"".     Thank you for this referral.  JATINDER Rojas, OTR/L  Time Calculation: 41 mins

## 2022-04-18 NOTE — PROGRESS NOTES
[facility] at [address] [place] [address] Surgery    Subjective     Doing well, tolerating diet, feeling tired and fatigued    Objective     Patient Vitals for the past 24 hrs:   Temp Pulse Resp BP SpO2   04/18/22 1547 99.3 °F (37.4 °C) 85 28 (!) 162/59 98 %   04/18/22 1456 -- 100 -- (!) 169/67 --   04/18/22 1400 -- (!) 117 -- -- --   04/18/22 1200 -- 88 -- -- --   04/18/22 1138 99 °F (37.2 °C) 77 20 139/64 --   04/18/22 1000 -- 76 -- -- --   04/18/22 0804 99.4 °F (37.4 °C) (!) 118 18 (!) 184/71 95 %   04/18/22 0800 -- -- -- -- 96 %   04/18/22 0605 -- 75 -- -- --   04/18/22 0500 99.5 °F (37.5 °C) -- 20 (!) 148/67 97 %   04/18/22 0414 -- 77 -- -- --   04/18/22 0341 100 °F (37.8 °C) -- 24 (!) 160/66 96 %   04/18/22 0203 -- 75 -- -- --   04/18/22 0026 99 °F (37.2 °C) -- 20 (!) 147/66 95 %   04/17/22 2212 -- 82 -- -- --   04/17/22 2008 98.5 °F (36.9 °C) 76 23 (!) 145/56 95 %   04/17/22 1825 -- 87 -- (!) 152/67 --       Date 04/17/22 0700 - 04/18/22 0659 04/18/22 0700 - 04/19/22 0659   Shift 3420-1913 2397-2729 24 Hour Total 2127-5724 2910-3417 24 Hour Total   INTAKE   P.O.    240  240     P. O.    240  240   Shift Total(mL/kg)    240(4.4)  240(4.4)   OUTPUT   Urine(mL/kg/hr) 1400(2)  1400(1.1)        Urine Voided 800  800        Urine Output (mL) (External Urinary Catheter 04/15/22) 600  600      Shift Total(mL/kg) 1400(23.7)  1400(25.4)      NET -1400  -1400 240  240   Weight (kg) 59.1 55 55 54.9 54.9 54.9       PE  Pulm - CTAB  CV - RRR  Abd - soft, ND, BS Present, minimal TTP    Labs  Recent Results (from the past 12 hour(s))   ECHO ADULT COMPLETE    Collection Time: 04/18/22  9:34 AM   Result Value Ref Range    IVSd 0.7 0.6 - 0.9 cm    LVIDd 3.8 (A) 3.9 - 5.3 cm    LVIDs 2.5 cm    LVPWd 0.8 0.6 - 0.9 cm    LVOT Peak Gradient 11 mmHg    LVOT Peak Velocity 1.7 m/s    RVIDd 5.1 cm    RVSP 44 mmHg    RV Free Wall Peak S' 15 cm/s    LA Diameter 4.1 cm    Est. RA Pressure 12 mmHg    AR .7 millisecond    AR Max Velocity PISA 5.2 m/s    AV Peak Gradient 13 mmHg    AV Peak Velocity 1.8 m/s    MV A Velocity 0.92 m/s    MV E Wave Deceleration Time 223.6 ms    MV E Velocity 0.74 m/s    LV E' Lateral Velocity 8 cm/s    LV E' Septal Velocity 9 cm/s    MV PHT 64.8 ms    MV Area by PHT 3.4 cm2    PV Peak Gradient 2 mmHg    PV Max Velocity 0.6 m/s    TAPSE 2.3 1.7 cm    TR Peak Gradient 32 mmHg    TR Max Velocity 2.82 m/s    Aortic Root 2.8 cm    Fractional Shortening 2D 34 28 - 44 %    LVIDd Index 2.41 cm/m2    LVIDs Index 1.58 cm/m2    LV RWT Ratio 0.42     LV Mass 2D 78.8 67 - 162 g    LV Mass 2D Index 49.9 43 - 95 g/m2    MV E/A 0.80     E/E' Ratio (Averaged) 8.74     E/E' Lateral 9.25     E/E' Septal 8.22     LA Size Index 2.59 cm/m2    LA/AO Root Ratio 1.46     Ao Root Index 1.77 cm/m2    AV Velocity Ratio 0.94          Assessment     Liang Russell is a 80 y. o.yr old female s/p IR coiled bleeding from the liver    Plan     Hgb with slight trend down but otherwise HD stable  No current need for Tx  No signs of any major bleeding  If hgb below 7 then Tx  No surgical role presently  Following     Leonie Etienne MD

## 2022-04-18 NOTE — PROGRESS NOTES
Bedside shift change report given to [facility] (oncoming nurse) by TUAN Chavez (offgoing nurse). Report included the following information SBAR, Kardex, Procedure Summary, MAR and Recent Results.

## 2022-04-18 NOTE — PROGRESS NOTES
Hospitalist Progress Note      Hospital summary: 80 y.o lady w/ cirrhosis, PUD, HTN, who presented with liver hemorrhage after a transjugular liver biopsy. Assessment/Plan:    Hepatic hemorrhage s/p angiogram with coiling. Acute blood loss anemia. -CT ABD/PELV: demonstrated \"active intrahepatic arterial contrast extravasation/bleeding, with surrounding intraparenchymal hematoma. There is large quantity hemoperitoneum\". -monitor H/H - slow downtrend  -General Surgery following, appreciate recs     HTN  -continue home metoprolol  -increased PRN IV hydralazine     PUD:   -Continue Pepcid     Cirrhosis:  -No hx of HE, not on home lactulose or rifaximin  -has chronic edema which she states is from venous insufficiency. Given one dose of IV furosemide on 4/17 with improvement. Will hold additional diuretics. PT/OT evals    Code status: full  DVT prophylaxis: SCDs  Disposition: likely home w/ home health in ~2 days (if H/H stable for 24 hours)  ----------------------------------------------    CC: abdominal pain    S: reports some RUQ pain (improved), not worsening, no n/v/d or dyspnea     Review of Systems:  Pertinent items are noted in HPI.     O:  Visit Vitals  BP (P) 139/64   Pulse (P) 77   Temp (P) 99 °F (37.2 °C)   Resp (P) 20   Ht 5' 4\" (1.626 m)   Wt 54.9 kg (121 lb)   SpO2 95%   Breastfeeding No   BMI 20.77 kg/m²       PHYSICAL EXAM:  Gen: NAD, non-toxic  HEENT: anicteric sclerae, pale conjunctiva  Neck: supple, trachea midline, no adenopathy  Heart: RRR, no MRG, no JVD, trace b/l LE edema  Lungs: CTA b/l, non-labored respirations  Abd: soft, RUQ tenderness, ND, BS+  Extr: warm  Skin: dry, no rash  Neuro: CN II-XII grossly intact, normal speech, moves all extremities  Psych: normal mood, appropriate affect      Intake/Output Summary (Last 24 hours) at 4/18/2022 1302  Last data filed at 4/18/2022 0956  Gross per 24 hour   Intake 240 ml   Output 800 ml   Net -560 ml Recent labs & imaging reviewed:  Recent Results (from the past 24 hour(s))   METABOLIC PANEL, BASIC    Collection Time: 04/18/22 12:33 AM   Result Value Ref Range    Sodium 136 136 - 145 mmol/L    Potassium 3.8 3.5 - 5.1 mmol/L    Chloride 102 97 - 108 mmol/L    CO2 30 21 - 32 mmol/L    Anion gap 4 (L) 5 - 15 mmol/L    Glucose 111 (H) 65 - 100 mg/dL    BUN 15 6 - 20 MG/DL    Creatinine 0.52 (L) 0.55 - 1.02 MG/DL    BUN/Creatinine ratio 29 (H) 12 - 20      GFR est AA >60 >60 ml/min/1.73m2    GFR est non-AA >60 >60 ml/min/1.73m2    Calcium 8.3 (L) 8.5 - 10.1 MG/DL   CBC W/O DIFF    Collection Time: 04/18/22 12:33 AM   Result Value Ref Range    WBC 13.7 (H) 3.6 - 11.0 K/uL    RBC 2.37 (L) 3.80 - 5.20 M/uL    HGB 7.0 (L) 11.5 - 16.0 g/dL    HCT 20.7 (L) 35.0 - 47.0 %    MCV 87.3 80.0 - 99.0 FL    MCH 29.5 26.0 - 34.0 PG    MCHC 33.8 30.0 - 36.5 g/dL    RDW 12.8 11.5 - 14.5 %    PLATELET 537 299 - 522 K/uL    MPV 10.7 8.9 - 12.9 FL    NRBC 0.0 0  WBC    ABSOLUTE NRBC 0.00 0.00 - 0.01 K/uL   ECHO ADULT COMPLETE    Collection Time: 04/18/22  9:34 AM   Result Value Ref Range    IVSd 0.7 0.6 - 0.9 cm    LVIDd 3.8 (A) 3.9 - 5.3 cm    LVIDs 2.5 cm    LVPWd 0.8 0.6 - 0.9 cm    LVOT Peak Gradient 11 mmHg    LVOT Peak Velocity 1.7 m/s    RVIDd 5.1 cm    RVSP 44 mmHg    RV Free Wall Peak S' 15 cm/s    LA Diameter 4.1 cm    Est. RA Pressure 12 mmHg    AR .7 millisecond    AR Max Velocity PISA 5.2 m/s    AV Peak Gradient 13 mmHg    AV Peak Velocity 1.8 m/s    MV A Velocity 0.92 m/s    MV E Wave Deceleration Time 223.6 ms    MV E Velocity 0.74 m/s    LV E' Lateral Velocity 8 cm/s    LV E' Septal Velocity 9 cm/s    MV PHT 64.8 ms    MV Area by PHT 3.4 cm2    PV Peak Gradient 2 mmHg    PV Max Velocity 0.6 m/s    TAPSE 2.3 1.7 cm    TR Peak Gradient 32 mmHg    TR Max Velocity 2.82 m/s    Aortic Root 2.8 cm    Fractional Shortening 2D 34 28 - 44 %    LVIDd Index 2.41 cm/m2    LVIDs Index 1.58 cm/m2    LV RWT Ratio 0.42 LV Mass 2D 78.8 67 - 162 g    LV Mass 2D Index 49.9 43 - 95 g/m2    MV E/A 0.80     E/E' Ratio (Averaged) 8.74     E/E' Lateral 9.25     E/E' Septal 8.22     LA Size Index 2.59 cm/m2    LA/AO Root Ratio 1.46     Ao Root Index 1.77 cm/m2    AV Velocity Ratio 0.94      Recent Labs     04/18/22  0033 04/17/22  0459   WBC 13.7* 13.8*   HGB 7.0* 7.5*   HCT 20.7* 22.2*    158     Recent Labs     04/18/22  0033 04/17/22  0459 04/16/22  0608 04/15/22  1742 04/15/22  1742    136 137   < > 138   K 3.8 4.1 4.3   < > 4.2    105 107   < > 107   CO2 30 30 25   < > 24   BUN 15 19 22*   < > 14   CREA 0.52* 0.56 0.67   < > 0.67   * 117* 102*   < > 136*   CA 8.3* 8.4* 7.7*   < > 7.5*   MG  --   --  2.0  --  2.0   PHOS  --   --  3.8  --  3.6    < > = values in this interval not displayed. No results for input(s): ALT, AP, TBIL, TBILI, TP, ALB, GLOB, GGT, AML, LPSE in the last 72 hours. No lab exists for component: SGOT, GPT, AMYP, HLPSE  Recent Labs     04/15/22  1755   INR 1.3*   PTP 13.0*      No results for input(s): FE, TIBC, PSAT, FERR in the last 72 hours. No results found for: FOL, RBCF   No results for input(s): PH, PCO2, PO2 in the last 72 hours. No results for input(s): CPK, CKNDX, TROIQ in the last 72 hours.     No lab exists for component: CPKMB  No results found for: CHOL, CHOLX, CHLST, CHOLV, HDL, HDLP, LDL, LDLC, DLDLP, TGLX, TRIGL, TRIGP, CHHD, CHHDX  No results found for: GLUCPOC  No results found for: COLOR, APPRN, SPGRU, REFSG, YUSUF, PROTU, GLUCU, KETU, BILU, UROU, BILLIE, LEUKU, GLUKE, EPSU, BACTU, WBCU, RBCU, CASTS, UCRY    Med list reviewed  Current Facility-Administered Medications   Medication Dose Route Frequency    hydrALAZINE (APRESOLINE) 20 mg/mL injection 20 mg  20 mg IntraVENous Q6H PRN    lidocaine 4 % patch 1 Patch  1 Patch TransDERmal Q24H    acetaminophen (TYLENOL) tablet 650 mg  650 mg Oral Q4H PRN    fentaNYL citrate (PF) injection 25 mcg  25 mcg IntraVENous Q2H PRN    traZODone (DESYREL) tablet 50 mg  50 mg Oral QHS    famotidine (PEPCID) tablet 20 mg  20 mg Oral BID    metoprolol tartrate (LOPRESSOR) tablet 25 mg  25 mg Oral BID    0.9% sodium chloride infusion 250 mL  250 mL IntraVENous PRN    iopamidoL (ISOVUE 300) 61 % contrast injection 50 mL  50 mL IntraCATHeter RAD PRN    0.9% sodium chloride infusion 250 mL  250 mL IntraVENous PRN     Facility-Administered Medications Ordered in Other Encounters   Medication Dose Route Frequency    0.9% sodium chloride infusion 250 mL  250 mL IntraVENous PRN       Care Plan discussed with:  Patient/Family, Nurse and     Juan aB MD  Internal Medicine  Date of Service: 4/18/2022

## 2022-04-19 LAB
ABO + RH BLD: NORMAL
ANION GAP SERPL CALC-SCNC: 4 MMOL/L (ref 5–15)
ANION GAP SERPL CALC-SCNC: 6 MMOL/L (ref 5–15)
BLD PROD TYP BPU: NORMAL
BLD PROD TYP BPU: NORMAL
BLOOD BANK CMNT PATIENT-IMP: NORMAL
BLOOD GROUP ANTIBODIES SERPL: NORMAL
BPU ID: NORMAL
BPU ID: NORMAL
BUN SERPL-MCNC: 15 MG/DL (ref 6–20)
BUN SERPL-MCNC: 17 MG/DL (ref 6–20)
BUN/CREAT SERPL: 31 (ref 12–20)
BUN/CREAT SERPL: 33 (ref 12–20)
CALCIUM SERPL-MCNC: 8 MG/DL (ref 8.5–10.1)
CALCIUM SERPL-MCNC: 8.4 MG/DL (ref 8.5–10.1)
CHLORIDE SERPL-SCNC: 102 MMOL/L (ref 97–108)
CHLORIDE SERPL-SCNC: 105 MMOL/L (ref 97–108)
CO2 SERPL-SCNC: 23 MMOL/L (ref 21–32)
CO2 SERPL-SCNC: 29 MMOL/L (ref 21–32)
CREAT SERPL-MCNC: 0.48 MG/DL (ref 0.55–1.02)
CREAT SERPL-MCNC: 0.51 MG/DL (ref 0.55–1.02)
CROSSMATCH RESULT,%XM: NORMAL
CROSSMATCH RESULT,%XM: NORMAL
ERYTHROCYTE [DISTWIDTH] IN BLOOD BY AUTOMATED COUNT: 13.2 % (ref 11.5–14.5)
ERYTHROCYTE [DISTWIDTH] IN BLOOD BY AUTOMATED COUNT: 15.1 % (ref 11.5–14.5)
GLUCOSE SERPL-MCNC: 100 MG/DL (ref 65–100)
GLUCOSE SERPL-MCNC: 83 MG/DL (ref 65–100)
HCT VFR BLD AUTO: 20.8 % (ref 35–47)
HCT VFR BLD AUTO: 30.9 % (ref 35–47)
HGB BLD-MCNC: 6.9 G/DL (ref 11.5–16)
HGB BLD-MCNC: 9.8 G/DL (ref 11.5–16)
HISTORY CHECKED?,CKHIST: NORMAL
MAGNESIUM SERPL-MCNC: 2 MG/DL (ref 1.6–2.4)
MCH RBC QN AUTO: 29.5 PG (ref 26–34)
MCH RBC QN AUTO: 29.5 PG (ref 26–34)
MCHC RBC AUTO-ENTMCNC: 31.7 G/DL (ref 30–36.5)
MCHC RBC AUTO-ENTMCNC: 33.2 G/DL (ref 30–36.5)
MCV RBC AUTO: 88.9 FL (ref 80–99)
MCV RBC AUTO: 93.1 FL (ref 80–99)
NRBC # BLD: 0.02 K/UL (ref 0–0.01)
NRBC # BLD: 0.12 K/UL (ref 0–0.01)
NRBC BLD-RTO: 0.2 PER 100 WBC
NRBC BLD-RTO: 0.8 PER 100 WBC
PLATELET # BLD AUTO: 111 K/UL (ref 150–400)
PLATELET # BLD AUTO: 206 K/UL (ref 150–400)
PMV BLD AUTO: 10.5 FL (ref 8.9–12.9)
PMV BLD AUTO: 11.8 FL (ref 8.9–12.9)
POTASSIUM SERPL-SCNC: 3.3 MMOL/L (ref 3.5–5.1)
POTASSIUM SERPL-SCNC: 4 MMOL/L (ref 3.5–5.1)
RBC # BLD AUTO: 2.34 M/UL (ref 3.8–5.2)
RBC # BLD AUTO: 3.32 M/UL (ref 3.8–5.2)
SODIUM SERPL-SCNC: 131 MMOL/L (ref 136–145)
SODIUM SERPL-SCNC: 138 MMOL/L (ref 136–145)
SPECIMEN EXP DATE BLD: NORMAL
STATUS OF UNIT,%ST: NORMAL
STATUS OF UNIT,%ST: NORMAL
UNIT DIVISION, %UDIV: 0
UNIT DIVISION, %UDIV: 0
WBC # BLD AUTO: 11.7 K/UL (ref 3.6–11)
WBC # BLD AUTO: 15.5 K/UL (ref 3.6–11)

## 2022-04-19 PROCEDURE — 74011250637 HC RX REV CODE- 250/637: Performed by: HOSPITALIST

## 2022-04-19 PROCEDURE — 86922 COMPATIBILITY TEST ANTIGLOB: CPT

## 2022-04-19 PROCEDURE — 74011000250 HC RX REV CODE- 250: Performed by: HOSPITALIST

## 2022-04-19 PROCEDURE — 86921 COMPATIBILITY TEST INCUBATE: CPT

## 2022-04-19 PROCEDURE — 36430 TRANSFUSION BLD/BLD COMPNT: CPT

## 2022-04-19 PROCEDURE — 36415 COLL VENOUS BLD VENIPUNCTURE: CPT

## 2022-04-19 PROCEDURE — 74011250636 HC RX REV CODE- 250/636: Performed by: NURSE PRACTITIONER

## 2022-04-19 PROCEDURE — P9016 RBC LEUKOCYTES REDUCED: HCPCS

## 2022-04-19 PROCEDURE — 80048 BASIC METABOLIC PNL TOTAL CA: CPT

## 2022-04-19 PROCEDURE — 93005 ELECTROCARDIOGRAM TRACING: CPT

## 2022-04-19 PROCEDURE — 86900 BLOOD TYPING SEROLOGIC ABO: CPT

## 2022-04-19 PROCEDURE — 86920 COMPATIBILITY TEST SPIN: CPT

## 2022-04-19 PROCEDURE — 99232 SBSQ HOSP IP/OBS MODERATE 35: CPT | Performed by: SURGERY

## 2022-04-19 PROCEDURE — 74011250637 HC RX REV CODE- 250/637: Performed by: NURSE PRACTITIONER

## 2022-04-19 PROCEDURE — 74011250636 HC RX REV CODE- 250/636: Performed by: HOSPITALIST

## 2022-04-19 PROCEDURE — 97535 SELF CARE MNGMENT TRAINING: CPT

## 2022-04-19 PROCEDURE — 85027 COMPLETE CBC AUTOMATED: CPT

## 2022-04-19 PROCEDURE — 65270000046 HC RM TELEMETRY

## 2022-04-19 PROCEDURE — 83735 ASSAY OF MAGNESIUM: CPT

## 2022-04-19 RX ORDER — POTASSIUM CHLORIDE 750 MG/1
40 TABLET, FILM COATED, EXTENDED RELEASE ORAL
Status: COMPLETED | OUTPATIENT
Start: 2022-04-19 | End: 2022-04-19

## 2022-04-19 RX ORDER — DILTIAZEM HYDROCHLORIDE 5 MG/ML
10 INJECTION INTRAVENOUS ONCE
Status: DISPENSED | OUTPATIENT
Start: 2022-04-19 | End: 2022-04-20

## 2022-04-19 RX ORDER — DILTIAZEM HYDROCHLORIDE 5 MG/ML
5 INJECTION INTRAVENOUS AS NEEDED
Status: DISCONTINUED | OUTPATIENT
Start: 2022-04-19 | End: 2022-04-26 | Stop reason: HOSPADM

## 2022-04-19 RX ORDER — SODIUM CHLORIDE 9 MG/ML
250 INJECTION, SOLUTION INTRAVENOUS AS NEEDED
Status: DISCONTINUED | OUTPATIENT
Start: 2022-04-19 | End: 2022-04-26 | Stop reason: HOSPADM

## 2022-04-19 RX ORDER — DILTIAZEM HYDROCHLORIDE 5 MG/ML
10 INJECTION INTRAVENOUS ONCE
Status: COMPLETED | OUTPATIENT
Start: 2022-04-19 | End: 2022-04-19

## 2022-04-19 RX ADMIN — HYDRALAZINE HYDROCHLORIDE 20 MG: 20 INJECTION INTRAMUSCULAR; INTRAVENOUS at 15:30

## 2022-04-19 RX ADMIN — SODIUM CHLORIDE 500 ML: 9 INJECTION, SOLUTION INTRAVENOUS at 17:49

## 2022-04-19 RX ADMIN — FAMOTIDINE 20 MG: 20 TABLET ORAL at 08:46

## 2022-04-19 RX ADMIN — ACETAMINOPHEN 650 MG: 325 TABLET ORAL at 21:46

## 2022-04-19 RX ADMIN — FENTANYL CITRATE 25 MCG: 0.05 INJECTION, SOLUTION INTRAMUSCULAR; INTRAVENOUS at 15:41

## 2022-04-19 RX ADMIN — METOPROLOL TARTRATE 25 MG: 25 TABLET, FILM COATED ORAL at 17:13

## 2022-04-19 RX ADMIN — FENTANYL CITRATE 25 MCG: 0.05 INJECTION, SOLUTION INTRAMUSCULAR; INTRAVENOUS at 21:46

## 2022-04-19 RX ADMIN — TRAZODONE HYDROCHLORIDE 50 MG: 50 TABLET ORAL at 21:46

## 2022-04-19 RX ADMIN — POTASSIUM CHLORIDE 40 MEQ: 750 TABLET, EXTENDED RELEASE ORAL at 08:51

## 2022-04-19 RX ADMIN — FAMOTIDINE 20 MG: 20 TABLET ORAL at 17:13

## 2022-04-19 RX ADMIN — DILTIAZEM HYDROCHLORIDE 10 MG: 5 INJECTION, SOLUTION INTRAVENOUS at 17:43

## 2022-04-19 RX ADMIN — METOPROLOL TARTRATE 25 MG: 25 TABLET, FILM COATED ORAL at 08:46

## 2022-04-19 NOTE — PROGRESS NOTES
Physical Therapy Note    Chart reviewed. Patient with most recent HBG of 6.9 and with plan for pRBCs. Spoke with RN in AM and in PM and patient still hasn't started blood. Will follow-up as appropriate.     Eros Diaz, PT, DPT

## 2022-04-19 NOTE — PROGRESS NOTES
1740- pt got oob -to chair- HR up to 180's- SVT/some afib -sustained-rapid response called- MD came -labs ,ECG ,IV bolus done -HR down after cardizem 10 mg given -monitored pt closely  Bedside shift change report given to Giggem (oncoming nurse) by TUAN Chavze (offgoing nurse). Report included the following information SBAR, Kardex, Procedure Summary, MAR and Recent Results.

## 2022-04-19 NOTE — PROGRESS NOTES
Hospitalist Progress Note      Hospital summary: 80 y.o lady w/ cirrhosis, PUD, HTN, who presented with liver hemorrhage after a transjugular liver biopsy. Assessment/Plan:    Hepatic hemorrhage s/p angiogram with coiling. Acute blood loss anemia. -CT ABD/PELV: demonstrated \"active intrahepatic arterial contrast extravasation/bleeding, with surrounding intraparenchymal hematoma. There is large quantity hemoperitoneum\". -monitor H/H - slow downtrend - 1 U today 4/19 for hgb 6.9  -General Surgery following, appreciate recs     HTN  -continue home metoprolol  -increased PRN IV hydralazine     PUD:   -Continue Pepcid     Cirrhosis:  -No hx of HE, not on home lactulose or rifaximin  -has chronic edema which she states is from venous insufficiency. Given one dose of IV furosemide on 4/17 with improvement. Will hold additional diuretics. -some diastolic dysfunction noted on echo    PT/OT evals    Code status: full  DVT prophylaxis: SCDs  Disposition: likely home w/ home health in ~2 days (if H/H stable for 24 hours)  ----------------------------------------------    CC: abdominal pain    S: reports some RUQ pain (improved), no n/v/d or dyspnea     Review of Systems:  Pertinent items are noted in HPI.     O:  Visit Vitals  BP (!) 157/60 (BP 1 Location: Left upper arm, BP Patient Position: At rest)   Pulse 74   Temp 98.3 °F (36.8 °C)   Resp 29   Ht 5' 4\" (1.626 m)   Wt 54.6 kg (120 lb 4.8 oz)   SpO2 93%   Breastfeeding No   BMI 20.65 kg/m²       PHYSICAL EXAM:  Gen: NAD, non-toxic  HEENT: anicteric sclerae, pale conjunctiva  Neck: supple, trachea midline, no adenopathy  Heart: RRR, no MRG, no JVD, trace b/l LE edema  Lungs: CTA b/l, non-labored respirations  Abd: soft, RUQ tenderness, ND, BS+  Extr: warm  Skin: dry, no rash  Neuro: CN II-XII grossly intact, normal speech, moves all extremities  Psych: normal mood, appropriate affect      Intake/Output Summary (Last 24 hours) at 4/19/2022 [address] filed at 4/19/2022 0040  Gross per 24 hour   Intake 240 ml   Output 350 ml   Net -110 ml        Recent labs & imaging reviewed:  Recent Results (from the past 24 hour(s))   METABOLIC PANEL, BASIC    Collection Time: 04/19/22  4:21 AM   Result Value Ref Range    Sodium 138 136 - 145 mmol/L    Potassium 3.3 (L) 3.5 - 5.1 mmol/L    Chloride 105 97 - 108 mmol/L    CO2 29 21 - 32 mmol/L    Anion gap 4 (L) 5 - 15 mmol/L    Glucose 100 65 - 100 mg/dL    BUN 17 6 - 20 MG/DL    Creatinine 0.51 (L) 0.55 - 1.02 MG/DL    BUN/Creatinine ratio 33 (H) 12 - 20      GFR est AA >60 >60 ml/min/1.73m2    GFR est non-AA >60 >60 ml/min/1.73m2    Calcium 8.0 (L) 8.5 - 10.1 MG/DL   CBC W/O DIFF    Collection Time: 04/19/22  4:21 AM   Result Value Ref Range    WBC 11.7 (H) 3.6 - 11.0 K/uL    RBC 2.34 (L) 3.80 - 5.20 M/uL    HGB 6.9 (L) 11.5 - 16.0 g/dL    HCT 20.8 (L) 35.0 - 47.0 %    MCV 88.9 80.0 - 99.0 FL    MCH 29.5 26.0 - 34.0 PG    MCHC 33.2 30.0 - 36.5 g/dL    RDW 13.2 11.5 - 14.5 %    PLATELET 392 316 - 349 K/uL    MPV 10.5 8.9 - 12.9 FL    NRBC 0.2 (H) 0  WBC    ABSOLUTE NRBC 0.02 (H) 0.00 - 0.01 K/uL   RBC, ALLOCATE    Collection Time: 04/19/22  8:15 AM   Result Value Ref Range    HISTORY CHECKED?  Historical check performed    TYPE & SCREEN    Collection Time: 04/19/22  9:45 AM   Result Value Ref Range    Crossmatch Expiration 04/22/2022,8128     ABO/Rh(D) B NEGATIVE     Antibody screen NEG     Comment Previously identified Anti D     Unit number [id number]     Blood component type RC LR,1     Unit division 00     Status of unit ALLOCATED     Crossmatch result Compatible      Recent Labs     04/19/22  0421 04/18/22  0033   WBC 11.7* 13.7*   HGB 6.9* 7.0*   HCT 20.8* 20.7*    168     Recent Labs     04/19/22 0421 04/18/22 0033 04/17/22  0459    136 136   K 3.3* 3.8 4.1    102 105   CO2 29 30 30   BUN 17 15 19   CREA 0.51* 0.52* 0.56    111* 117*   CA 8.0* 8.3* 8.4*     No results for input(s): ALT, AP, TBIL, TBILI, TP, ALB, GLOB, GGT, AML, LPSE in the last 72 hours. No lab exists for component: SGOT, GPT, AMYP, HLPSE  No results for input(s): INR, PTP, APTT, INREXT, INREXT in the last 72 hours. No results for input(s): FE, TIBC, PSAT, FERR in the last 72 hours. No results found for: FOL, RBCF   No results for input(s): PH, PCO2, PO2 in the last 72 hours. No results for input(s): CPK, CKNDX, TROIQ in the last 72 hours.     No lab exists for component: CPKMB  No results found for: CHOL, CHOLX, CHLST, CHOLV, HDL, HDLP, LDL, LDLC, DLDLP, TGLX, TRIGL, TRIGP, CHHD, CHHDX  No results found for: GLUCPOC  No results found for: COLOR, APPRN, SPGRU, REFSG, YUSUF, PROTU, GLUCU, KETU, BILU, UROU, BILLIE, LEUKU, GLUKE, EPSU, BACTU, WBCU, RBCU, CASTS, UCRY    Med list reviewed  Current Facility-Administered Medications   Medication Dose Route Frequency    0.9% sodium chloride infusion 250 mL  250 mL IntraVENous PRN    hydrALAZINE (APRESOLINE) 20 mg/mL injection 20 mg  20 mg IntraVENous Q6H PRN    lidocaine 4 % patch 1 Patch  1 Patch TransDERmal Q24H    acetaminophen (TYLENOL) tablet 650 mg  650 mg Oral Q4H PRN    fentaNYL citrate (PF) injection 25 mcg  25 mcg IntraVENous Q2H PRN    traZODone (DESYREL) tablet 50 mg  50 mg Oral QHS    famotidine (PEPCID) tablet 20 mg  20 mg Oral BID    metoprolol tartrate (LOPRESSOR) tablet 25 mg  25 mg Oral BID    0.9% sodium chloride infusion 250 mL  250 mL IntraVENous PRN    iopamidoL (ISOVUE 300) 61 % contrast injection 50 mL  50 mL IntraCATHeter RAD PRN    0.9% sodium chloride infusion 250 mL  250 mL IntraVENous PRN       Care Plan discussed with:  Patient/Family, Nurse and     Juan Ba MD  Internal Medicine  Date of Service: 4/19/2022

## 2022-04-19 NOTE — PROGRESS NOTES
RRT called for tachycardia. Pt just moved from the bed to the chair. On exam she is resting. Heart irreg irreg tachycardic. BP OK (hypertensive)  Tele shows HR in the 140s-180s and irregular, appears to be afib. EKG looks like afib w/ RVR, some lateral ST depressions. Received a unit of blood earlier. Her urine output has been low today. Will give an IV NS bolus and if responds will give more IVF, if not will give IV diltiazem bolus followed by a drip if needed. Cardiology consulted for other reasons but will ask VCS to see again in the AM.  Check labs. Patient given 500 IV NS bolus and 10 mg IV diltiazem and HR down to the 90s-100s, appears to be afib on tele. Will repeat an ECG. Consult cardiology in the AM. Not safe for anticoagulation right now. Patient is critically ill and at risk for decline, CCT-35 minutes.

## 2022-04-19 NOTE — PROGRESS NOTES
Echo shows normal LV function and no outflow tract obstruction. Can be discharged from my end at any time.    Thanks

## 2022-04-19 NOTE — PROGRESS NOTES
Bedside shift change report given to KENNETH Johnson (oncoming nurse) by Carlota Damian RN (offgoing nurse). Report included the following information SBAR, Kardex, MAR, Accordion, Recent Results and Med Rec Status.

## 2022-04-19 NOTE — PROGRESS NOTES
[facility]    Hgb stable from yesterday, down on 0.1, looks like she is getting PRBC today, I do not see any current signs of bleeding. She can continue diet as tolerated. Once her hgb is stable at least 24-48hrs she should be ok for DC home in a few days.       [place]

## 2022-04-19 NOTE — PROGRESS NOTES
Rapid Response called overhead at this time, RRT responding     RN arrives to find patient sitting in chair with heart rate in 150s, BP 160s/80s. Patient assisted to bed, has no complaints at this time. EKG performed and patient found to be in A-fib w/ RVR, Patient has history of A-fib. Dr. Trujillo Stands at bedside at this time. Orders obtained for medications and 500 mL fluid bolus and labs being drawn. , /83    1732: 500 mL fluid bolus infusing at this time, per Dr. Trujillo Stands after fluid bolus is complete, then give medications if patient has not improved    1746: 10mg of diltiazem given by primary RN. Pt hr now 97, /69.  Dr. Trujillo Stands aware     RRT will continue to follow

## 2022-04-19 NOTE — PROGRESS NOTES
Physician Progress Note      PATIENT:               Anil Irby  CSN #:                  [id number]  :                       1940  ADMIT DATE:       4/15/2022 11:46 AM  DISCH DATE:  RESPONDING  PROVIDER #:        HEMANT Denise MD          QUERY TEXT:    Pt admitted with liver hemorrhage  and underwent a liver biopsy on .? If possible, please document in progress notes and discharge summary:    The medical record reflects the following:  Risk Factors: post biopsy  Clinical Indicators:  H/P  underwent a transjugular liver biopsy on   Hepatic hemorrhage: CT demonstrated \"active intrahepatic arterial contrast extravasation/bleeding, with surrounding intraparenchymal hematoma. There is large quantity hemoperitoneum\". Now s/p angiogram with coiling. Treatment: coiling    Thank you,  Shaquille Tony RN, CDI, CRCR, CCDS  Certified Clinical   [phone]  Options provided:  -- liver hemorrhage?is a?postoperative complication  -- liver hemorrhage is not a postoperative complication  -- liver hemorrhage is not a postoperative complication, but is due to cirrhosis  -- Other - I will add my own diagnosis  -- Disagree - Not applicable / Not valid  -- Disagree - Clinically unable to determine / Unknown  -- Refer to Clinical Documentation Reviewer    PROVIDER RESPONSE TEXT:    Patient has liver hemorrhage as a?postoperative complication.     Query created by: Naveed Villagomez on 2022 1:02 PM      Electronically signed by:  Hiwot Jeter MD 2022 1:10 PM

## 2022-04-19 NOTE — PROGRESS NOTES
Problem: Self Care Deficits Care Plan (Adult)  Goal: *Acute Goals and Plan of Care (Insert Text)  Description: FUNCTIONAL STATUS PRIOR TO ADMISSION: Patient was independent and active without use of DME, was highly independent for basic and instrumental ADLs. HOME SUPPORT: The patient lived with her spouse (who is unable to physically assist d/t limitations of his own) but did not require assist.    Occupational Therapy Goals  Initiated 4/18/2022  1. Patient will perform grooming at the sink with supervision/set-up within 7 day(s). 2.  Patient will perform bathing with supervision/set-up within 7 day(s). 3.  Patient will perform upper and lower body dressing with supervision/set-up within 7 day(s). 4.  Patient will perform toilet transfers with supervision/set-up within 7 day(s). 5.  Patient will perform all aspects of toileting with supervision/set-up within 7 day(s). 6.  Patient will participate in upper extremity therapeutic exercise/activities with supervision/set-up for 10 minutes with rest breaks PRN within 7 day(s). 7.  Patient will utilize energy conservation techniques during functional activities with verbal and visual cues within 7 day(s). Outcome: Progressing Towards Goal   OCCUPATIONAL THERAPY TREATMENT  Patient: Armani Butler (20 y.o. female)  Date: 4/19/2022  Diagnosis: Liver hemorrhage [K76.89] <principal problem not specified>      Precautions: Fall  Chart, occupational therapy assessment, plan of care, and goals were reviewed. ASSESSMENT  Patient continues with skilled OT services and is progressing towards goals. ADLs limited by cognition (complex processing, memory, sequencing), standing tolerance, functional reach, abdominal pain, HTN, pulmonary tolerance (requiring 2L NC) and dynamic standing tolerance. Current Level of Function Impacting Discharge (ADLs): setup upper body ADLs, moderate assistance lower body ADLs and bed mobility.      Other factors to consider for discharge: spouse elderly and just out of hospital         PLAN :  Patient continues to benefit from skilled intervention to address the above impairments. Continue treatment per established plan of care to address goals. Recommend with staff: continued OOB for ADLs, toileting in bathroom    Recommend next OT session: standing and gathering ADLs, bathing    Recommendation for discharge: (in order for the patient to meet his/her long term goals)  Occupational therapy at least 2 days/week in the home assistance with instrumental ADLs    This discharge recommendation:  Has not yet been discussed the attending provider and/or case management    IF patient discharges home will need the following DME: AE: long handled bathing and AE: long handled dressing       SUBJECTIVE:   Patient stated Do you think I will be ready if I go home tomorrow?     OBJECTIVE DATA SUMMARY:   Cognitive/Behavioral Status:  Neurologic State: Alert  Orientation Level: Oriented X4  Cognition: Appropriate decision making; Appropriate for age attention/concentration; Appropriate safety awareness             Functional Mobility and Transfers for ADLs:  Bed Mobility:   Supine -sit supervision instruction log rolling  Sit-supine: moderate assistance LEs, instruction log roll to decrease abdominal pain    Transfers:     Functional Transfers  Bathroom Mobility: Supervision/set up  Toilet Transfer : Supervision       Balance:  Sitting: Intact; Without support  Standing: Impaired; Without support  Standing - Static: Good  Standing - Dynamic : Fair    ADL Intervention:                  Grooming: standing at sink to brush teeth, wash hands, comb hair 4.5 mins supervision with stating increased fatigue     Stress management: reading, book club, breathing         Toileting  Bladder Hygiene: Supervision  Bowel Hygiene: Supervision  Clothing Management: Minimum assistance     Patient instructed and indicated understanding the benefits of maintaining activity tolerance, functional mobility, and independence with self care tasks during acute stay  to ensure safe return home and to baseline. Encouraged patient to increase frequency and duration OOB, be out of bed for all meals, perform daily ADLs (as approved by RN/MD regarding bathing etc), and performing functional mobility to/from bathroom. Instruction on safety medication and financial management - patient stating will look into son assisting and supervising. Therapeutic Exercises:       Pain:  Abdomen, waiting on pain meds    Activity Tolerance:   Fair  Vitals:    04/19/22 1400 04/19/22 1418 04/19/22 1515 04/19/22 1517   BP:  (!) 172/69 (!) 199/78 (!) 196/79   Pulse: 86 86 88 82   Resp:  21 25 24   Temp:  99 °F (37.2 °C)     SpO2:   91% 95%   Weight:       Height:             After treatment patient left in no apparent distress:   Supine in bed, Call bell within reach, Bed / chair alarm activated, Caregiver / family present, and Side rails x 3    COMMUNICATION/COLLABORATION:   The patients plan of care was discussed with: Registered nurse.      Patricia Torrez  Time Calculation: 38 mins

## 2022-04-19 NOTE — PROGRESS NOTES
Transition of Care  1. RUR 11% Low  2. Disposition TBD-PT/OT following  3. DME Cane and walker  4. Transportation Family vs Medical  5. Follow Up PCP, Specialist      CM met with patient's daughter Triston Asencio [phone]. Per Triston Asencio, the preference is for patient to DC home with home health and personal care. Patient's  is home but is not able to help take care of patient. CM provided personal care listing to daughter along with SNF rehab list. Patient has two other children, a daughter who lives in [address] [place] [address] and a son who lives is [facility]. Triston Asencio lives in [address] [place] [address] as well. CM to monitor.      Gia Powell MS

## 2022-04-20 LAB
ABO + RH BLD: NORMAL
ANION GAP SERPL CALC-SCNC: 6 MMOL/L (ref 5–15)
ATRIAL RATE: 104 BPM
BLD PROD TYP BPU: NORMAL
BLOOD BANK CMNT PATIENT-IMP: NORMAL
BLOOD GROUP ANTIBODIES SERPL: NORMAL
BPU ID: NORMAL
BUN SERPL-MCNC: 14 MG/DL (ref 6–20)
BUN/CREAT SERPL: 24 (ref 12–20)
CALCIUM SERPL-MCNC: 8.9 MG/DL (ref 8.5–10.1)
CALCULATED R AXIS, ECG10: 72 DEGREES
CALCULATED T AXIS, ECG11: 3 DEGREES
CHLORIDE SERPL-SCNC: 103 MMOL/L (ref 97–108)
CO2 SERPL-SCNC: 27 MMOL/L (ref 21–32)
CREAT SERPL-MCNC: 0.59 MG/DL (ref 0.55–1.02)
CROSSMATCH RESULT,%XM: NORMAL
DIAGNOSIS, 93000: NORMAL
ERYTHROCYTE [DISTWIDTH] IN BLOOD BY AUTOMATED COUNT: 15.4 % (ref 11.5–14.5)
GLUCOSE SERPL-MCNC: 98 MG/DL (ref 65–100)
HCT VFR BLD AUTO: 31.5 % (ref 35–47)
HGB BLD-MCNC: 10.4 G/DL (ref 11.5–16)
MCH RBC QN AUTO: 29.1 PG (ref 26–34)
MCHC RBC AUTO-ENTMCNC: 33 G/DL (ref 30–36.5)
MCV RBC AUTO: 88.2 FL (ref 80–99)
NRBC # BLD: 0.05 K/UL (ref 0–0.01)
NRBC BLD-RTO: 0.3 PER 100 WBC
PLATELET # BLD AUTO: 290 K/UL (ref 150–400)
PMV BLD AUTO: 10.3 FL (ref 8.9–12.9)
POTASSIUM SERPL-SCNC: 3.3 MMOL/L (ref 3.5–5.1)
Q-T INTERVAL, ECG07: 356 MS
QRS DURATION, ECG06: 80 MS
QTC CALCULATION (BEZET), ECG08: 479 MS
RBC # BLD AUTO: 3.57 M/UL (ref 3.8–5.2)
SODIUM SERPL-SCNC: 136 MMOL/L (ref 136–145)
SPECIMEN EXP DATE BLD: NORMAL
STATUS OF UNIT,%ST: NORMAL
UNIT DIVISION, %UDIV: 0
VENTRICULAR RATE, ECG03: 109 BPM
WBC # BLD AUTO: 14.7 K/UL (ref 3.6–11)

## 2022-04-20 PROCEDURE — 36415 COLL VENOUS BLD VENIPUNCTURE: CPT

## 2022-04-20 PROCEDURE — 85027 COMPLETE CBC AUTOMATED: CPT

## 2022-04-20 PROCEDURE — 65270000046 HC RM TELEMETRY

## 2022-04-20 PROCEDURE — 74011250637 HC RX REV CODE- 250/637: Performed by: HOSPITALIST

## 2022-04-20 PROCEDURE — 74011000250 HC RX REV CODE- 250: Performed by: NURSE PRACTITIONER

## 2022-04-20 PROCEDURE — 97116 GAIT TRAINING THERAPY: CPT

## 2022-04-20 PROCEDURE — 74011250637 HC RX REV CODE- 250/637: Performed by: NURSE PRACTITIONER

## 2022-04-20 PROCEDURE — 94760 N-INVAS EAR/PLS OXIMETRY 1: CPT

## 2022-04-20 PROCEDURE — 74011250636 HC RX REV CODE- 250/636: Performed by: HOSPITALIST

## 2022-04-20 PROCEDURE — 74011250636 HC RX REV CODE- 250/636: Performed by: NURSE PRACTITIONER

## 2022-04-20 PROCEDURE — 97530 THERAPEUTIC ACTIVITIES: CPT

## 2022-04-20 PROCEDURE — 80048 BASIC METABOLIC PNL TOTAL CA: CPT

## 2022-04-20 RX ORDER — POTASSIUM CHLORIDE 750 MG/1
40 TABLET, FILM COATED, EXTENDED RELEASE ORAL ONCE
Status: COMPLETED | OUTPATIENT
Start: 2022-04-20 | End: 2022-04-20

## 2022-04-20 RX ADMIN — HYDRALAZINE HYDROCHLORIDE 20 MG: 20 INJECTION INTRAMUSCULAR; INTRAVENOUS at 02:49

## 2022-04-20 RX ADMIN — METOPROLOL TARTRATE 25 MG: 25 TABLET, FILM COATED ORAL at 08:30

## 2022-04-20 RX ADMIN — ACETAMINOPHEN 650 MG: 325 TABLET ORAL at 22:39

## 2022-04-20 RX ADMIN — FAMOTIDINE 20 MG: 20 TABLET ORAL at 18:28

## 2022-04-20 RX ADMIN — HYDRALAZINE HYDROCHLORIDE 20 MG: 20 INJECTION INTRAMUSCULAR; INTRAVENOUS at 11:34

## 2022-04-20 RX ADMIN — FENTANYL CITRATE 25 MCG: 0.05 INJECTION, SOLUTION INTRAMUSCULAR; INTRAVENOUS at 23:34

## 2022-04-20 RX ADMIN — POTASSIUM CHLORIDE 40 MEQ: 750 TABLET, EXTENDED RELEASE ORAL at 11:34

## 2022-04-20 RX ADMIN — TRAZODONE HYDROCHLORIDE 50 MG: 50 TABLET ORAL at 21:04

## 2022-04-20 RX ADMIN — FENTANYL CITRATE 25 MCG: 0.05 INJECTION, SOLUTION INTRAMUSCULAR; INTRAVENOUS at 16:24

## 2022-04-20 RX ADMIN — ACETAMINOPHEN 650 MG: 325 TABLET ORAL at 11:33

## 2022-04-20 RX ADMIN — FENTANYL CITRATE 25 MCG: 0.05 INJECTION, SOLUTION INTRAMUSCULAR; INTRAVENOUS at 21:04

## 2022-04-20 RX ADMIN — FAMOTIDINE 20 MG: 20 TABLET ORAL at 08:30

## 2022-04-20 RX ADMIN — METOPROLOL TARTRATE 25 MG: 25 TABLET, FILM COATED ORAL at 18:28

## 2022-04-20 NOTE — PROGRESS NOTES
[facility]    Pts hgb had a good rise with PRBC. New issues with afib. Again no signs of active bleeding. Hopefully once her global issues improve she would be ready for DC. Following peripherally.     Prakash Corrigan

## 2022-04-20 NOTE — PROGRESS NOTES
Spiritual Care Assessment/Progress Note  [facility]      NAME: Priya Chen      MRN: [id number]  AGE: 80 y.o.  SEX: female  Adventist Affiliation: Jehovah's witness   Language: English     4/20/2022     Total Time (in minutes): 5     Spiritual Assessment begun in [address] [facility] through conversation with:         [x]Patient        [] Family    [] Friend(s)        Reason for Consult: [facility]     Spiritual beliefs: (Please include comment if needed)     [x] Identifies with a jose guadalupe tradition: Jehovah's witness       [x] Supported by a jose guadalupe community: [employer]           [] Claims no spiritual orientation:           [] Seeking spiritual identity:                [] Adheres to an individual form of spirituality:           [] Not able to assess:                           Identified resources for coping:      [x] Prayer                               [x] Music                  [] Guided Imagery     [x] Family/friends                 [] Pet visits     [] Devotional reading                         [] Unknown     [] Other:                                               Interventions offered during this visit: (See comments for more details)    Patient Interventions: Affirmation of jose guadalupe,Catharsis/review of pertinent events in supportive environment,Communion (Jehovah's witness),Initial/Spiritual assessment, patient floor,Prayer (actual),Prayer (assurance of)           Plan of Care:     [x] Support spiritual and/or cultural needs    [] Support AMD and/or advance care planning process      [] Support grieving process   [] Coordinate Rites and/or Rituals    [] Coordination with community clergy   [] No spiritual needs identified at this time   [] Detailed Plan of Care below (See Comments)  [] Make referral to Music Therapy  [] Make referral to Pet Therapy     [] Make referral to Addiction services  [] Make referral to [facility]  [] Make referral to Spiritual Care Partner  [] No future visits requested        [] Contact Spiritual Care for further referrals     Comments: Mrs. Jacqueline Bolden was sitting up in chair. Put Mass schedule on white board. Prayer and communion offered. Mrs. Jacqueline Bolden lives with her .     VICENTE Lees, RN, ACSW, LCSW   Page:  [phone]

## 2022-04-20 NOTE — PROGRESS NOTES
Bedside shift change report given to Katja Mcguire RN (oncoming nurse) by Jennifer Deleon RN (offgoing nurse). Report included the following information SBAR, Kardex, MAR, Accordion, Recent Results, Med Rec Status and Cardiac Rhythm NSR.

## 2022-04-20 NOTE — PROGRESS NOTES
Problem: Mobility Impaired (Adult and Pediatric)  Goal: *Acute Goals and Plan of Care (Insert Text)  Description: FUNCTIONAL STATUS PRIOR TO ADMISSION: Patient was independent and active without use of DME. When asked she endorsed 3-4 falls in the last 12 months. HOME SUPPORT PRIOR TO ADMISSION: The patient lived with her  but did not require assist. Of note he uses a walker. Physical Therapy Goals  Initiated 4/17/2022  1. Patient will move from supine to sit and sit to supine , scoot up and down, and roll side to side in bed with independence within 7 day(s). 2.  Patient will transfer from bed to chair and chair to bed with independence using the least restrictive device within 7 day(s). 3.  Patient will perform sit to stand with independence within 7 day(s). 4.  Patient will ambulate with independence for 150 feet with the least restrictive device within 7 day(s). 5.  Patient will ascend/descend 4 stairs with one handrail(s) with modified independence within 7 day(s). Outcome: Progressing Towards Goal     PHYSICAL THERAPY TREATMENT  Patient: Tonia Pepper (79 y.o. female)  Date: 4/20/2022  Diagnosis: Liver hemorrhage [K76.89] <principal problem not specified>       Precautions: Fall  Chart, physical therapy assessment, plan of care and goals were reviewed. ASSESSMENT  Patient continues with skilled PT services and is slowly progressing towards goals. Requires increased time with all tasks and functioning well below independent baseline. She has had a few recent falls and remains at a high risk for future falls. She is unsteady without assistive device and would benefit from trial with rolling walker next session. She fatigues quickly and requires rest breaks throughout. Given limited support available at home, she needs to be more independent prior to discharge home.  At this time would recommend she discharge to subacute rehab to maximize functional potential and independence with functional mobility. Patient currently declining rehab and requesting to discharge home. She would need to make significant progress to be able to manage independently at home given  unable to physically assist and children unable to provided consistent support. Acute PT will continue to follow and progress as able. Current Level of Function Impacting Discharge (mobility/balance): minimal assist with supine<>sit, sit to stand, short gait distance    Other factors to consider for discharge:  recently discharged from hospital and using walker, children unable to provided consistent support, patient wants to go home not rehab         PLAN :  Patient continues to benefit from skilled intervention to address the above impairments. Continue treatment per established plan of care. to address goals. Recommendation for discharge: (in order for the patient to meet his/her long term goals)  Therapy up to 5 days/week in SNF setting   Patient currently declining SNF - if discharge home against PT recommendation would need assist at home for mobility/ADLs/IADLS, and HHPT    This discharge recommendation:  Has been made in collaboration with the attending provider and/or case management    IF patient discharges home will need the following DME: patient owns DME required for discharge       SUBJECTIVE:   Patient stated I'm not going to rehab.     OBJECTIVE DATA SUMMARY:   Critical Behavior:  Neurologic State: Alert  Orientation Level: Oriented X4  Cognition: Appropriate decision making,Appropriate for age attention/concentration,Appropriate safety awareness  Safety/Judgement: Awareness of environment,Decreased awareness of need for assistance,Decreased awareness of need for safety,Decreased insight into deficits  Functional Mobility Training:  Bed Mobility:  Supine to Sit: Minimum assistance;Bed Modified; Additional time (use of bed rail)  Transfers:  Sit to Stand: Minimum assistance; Additional time  Stand to Sit: Minimum assistance; Additional time  Balance:  Sitting: Intact; Without support  Standing: Impaired; With support  Standing - Static: Fair  Standing - Dynamic : Fair  Ambulation/Gait Training:  Distance (ft): 20 Feet (ft) (x2)  Assistive Device: Gait belt (intermittent handheld assist and reaching for support)  Ambulation - Level of Assistance: Minimal assistance (unsteady, a couple mild losses of balance)  Gait Abnormalities: Decreased step clearance; Path deviations (unsteady, a couple minor losses of balance)  Base of Support: Narrowed  Speed/Lissett: Slow  Step Length: Left shortened;Right shortened    Pain Rating:  Endorses R shoulder pain - MD bedside and aware  RN aware  Activity Tolerance:   Fair and requires frequent rest breaks    After treatment patient left in no apparent distress:   Sitting in chair, Call bell within reach and Bed / chair alarm activated    COMMUNICATION/COLLABORATION:   The patients plan of care was discussed with: Occupational therapist, Registered nurse and Physician.      Sanchez Freire, PT   Time Calculation: 27 mins

## 2022-04-20 NOTE — PROGRESS NOTES
Hospitalist Progress Note      Hospital summary: 80 y.o lady w/ cirrhosis, PUD, HTN, who presented with liver hemorrhage after a transjugular liver biopsy. Assessment/Plan:    Hepatic hemorrhage s/p angiogram with coiling. Acute blood loss anemia. -CT ABD/PELV: demonstrated \"active intrahepatic arterial contrast extravasation/bleeding, with surrounding intraparenchymal hematoma. There is large quantity hemoperitoneum\". -monitor H/H - slow downtrend -  S/p PRBC now Hgb >8  -General Surgery following, appreciate recs     HTN  -continue home metoprolol  -increased PRN IV hydralazine     PUD:   -Continue Pepcid     Cirrhosis:  -No hx of HE, not on home lactulose or rifaximin  -has chronic edema which she states is from venous insufficiency. Given one dose of IV furosemide on 4/17 with improvement. Will hold additional diuretics. -some diastolic dysfunction noted on echo    Hypokalemia- replete potassium today    Code status: full  DVT prophylaxis: SCDs  Disposition: likely home w/ home health once medically stable and cleared by PT OT  ----------------------------------------------    CC: abdominal pain    S: Patient seen and examined for the first time, chart was reviewed. She had an episode of A. fib with RVR last evening and now resolved. Cardiology reevaluation is pending. Working with therapy today. She had some mild right shoulder pain. Per PT she is not independent enough to go home at this point. She is refusing to go to SNF for rehab at this time.     O:  Visit Vitals  BP (!) 148/83 (BP 1 Location: Right upper arm, BP Patient Position: At rest)   Pulse 86   Temp 97.7 °F (36.5 °C)   Resp 26   Ht 5' 4\" (1.626 m)   Wt 55.2 kg (121 lb 11.1 oz)   SpO2 90%   Breastfeeding No   BMI 20.89 kg/m²       PHYSICAL EXAM:  Gen: Thin built and nourished  HEENT:  pale conjunctiva  Neck: supple   Heart: RRR,   Lungs:  non-labored respirations  Abd: soft, RUQ tenderness,    Extr: warm  Skin:  no rash  Neuro:  normal speech, moves all extremities  Psych:  Flat affect      Intake/Output Summary (Last 24 hours) at 4/20/2022 0959  Last data filed at 4/20/2022 0334  Gross per 24 hour   Intake 478.8 ml   Output 950 ml   Net -471.2 ml        Recent labs & imaging reviewed:    Recent Labs     04/20/22  0338 04/19/22  1732 04/19/22  0421    131* 138   K 3.3* 4.0 3.3*    102 105   CO2 27 23 29   BUN 14 15 17   CREA 0.59 0.48* 0.51*   GLU 98 83 100   CA 8.9 8.4* 8.0*   MG  --  2.0  --        Med list reviewed  Current Facility-Administered Medications   Medication Dose Route Frequency    potassium chloride SR (KLOR-CON 10) tablet 40 mEq  40 mEq Oral ONCE    0.9% sodium chloride infusion 250 mL  250 mL IntraVENous PRN    dilTIAZem (CARDIZEM) injection 5 mg  5 mg IntraVENous PRN    hydrALAZINE (APRESOLINE) 20 mg/mL injection 20 mg  20 mg IntraVENous Q6H PRN    lidocaine 4 % patch 1 Patch  1 Patch TransDERmal Q24H    acetaminophen (TYLENOL) tablet 650 mg  650 mg Oral Q4H PRN    fentaNYL citrate (PF) injection 25 mcg  25 mcg IntraVENous Q2H PRN    traZODone (DESYREL) tablet 50 mg  50 mg Oral QHS    famotidine (PEPCID) tablet 20 mg  20 mg Oral BID    metoprolol tartrate (LOPRESSOR) tablet 25 mg  25 mg Oral BID    0.9% sodium chloride infusion 250 mL  250 mL IntraVENous PRN    iopamidoL (ISOVUE 300) 61 % contrast injection 50 mL  50 mL IntraCATHeter RAD PRN    0.9% sodium chloride infusion 250 mL  250 mL IntraVENous PRN         Scotty Clemens MD  Internal Medicine  Date of Service: 4/20/2022

## 2022-04-21 LAB
ALBUMIN SERPL-MCNC: 2.3 G/DL (ref 3.5–5)
ANION GAP SERPL CALC-SCNC: 1 MMOL/L (ref 5–15)
BASOPHILS # BLD: 0.1 K/UL (ref 0–0.1)
BASOPHILS NFR BLD: 1 % (ref 0–1)
BUN SERPL-MCNC: 18 MG/DL (ref 6–20)
BUN/CREAT SERPL: 38 (ref 12–20)
CALCIUM SERPL-MCNC: 8.5 MG/DL (ref 8.5–10.1)
CHLORIDE SERPL-SCNC: 105 MMOL/L (ref 97–108)
CO2 SERPL-SCNC: 28 MMOL/L (ref 21–32)
CREAT SERPL-MCNC: 0.48 MG/DL (ref 0.55–1.02)
DIFFERENTIAL METHOD BLD: ABNORMAL
EOSINOPHIL # BLD: 0.1 K/UL (ref 0–0.4)
EOSINOPHIL NFR BLD: 1 % (ref 0–7)
ERYTHROCYTE [DISTWIDTH] IN BLOOD BY AUTOMATED COUNT: 15.4 % (ref 11.5–14.5)
GLUCOSE SERPL-MCNC: 103 MG/DL (ref 65–100)
HCT VFR BLD AUTO: 26.4 % (ref 35–47)
HGB BLD-MCNC: 8.7 G/DL (ref 11.5–16)
IMM GRANULOCYTES # BLD AUTO: 0 K/UL
IMM GRANULOCYTES NFR BLD AUTO: 0 %
LYMPHOCYTES # BLD: 1.3 K/UL (ref 0.8–3.5)
LYMPHOCYTES NFR BLD: 10 % (ref 12–49)
MAGNESIUM SERPL-MCNC: 2 MG/DL (ref 1.6–2.4)
MCH RBC QN AUTO: 29.1 PG (ref 26–34)
MCHC RBC AUTO-ENTMCNC: 33 G/DL (ref 30–36.5)
MCV RBC AUTO: 88.3 FL (ref 80–99)
MONOCYTES # BLD: 1.7 K/UL (ref 0–1)
MONOCYTES NFR BLD: 13 % (ref 5–13)
NEUTS BAND NFR BLD MANUAL: 4 % (ref 0–6)
NEUTS SEG # BLD: 10 K/UL (ref 1.8–8)
NEUTS SEG NFR BLD: 71 % (ref 32–75)
NRBC # BLD: 0.02 K/UL (ref 0–0.01)
NRBC BLD-RTO: 0.2 PER 100 WBC
PHOSPHATE SERPL-MCNC: 1.9 MG/DL (ref 2.6–4.7)
PLATELET # BLD AUTO: 289 K/UL (ref 150–400)
PLATELET COMMENTS,PCOM: ABNORMAL
PMV BLD AUTO: 10.1 FL (ref 8.9–12.9)
POTASSIUM SERPL-SCNC: 3.9 MMOL/L (ref 3.5–5.1)
RBC # BLD AUTO: 2.99 M/UL (ref 3.8–5.2)
RBC MORPH BLD: ABNORMAL
SODIUM SERPL-SCNC: 134 MMOL/L (ref 136–145)
WBC # BLD AUTO: 13.2 K/UL (ref 3.6–11)

## 2022-04-21 PROCEDURE — 74011250637 HC RX REV CODE- 250/637: Performed by: NURSE PRACTITIONER

## 2022-04-21 PROCEDURE — 65270000046 HC RM TELEMETRY

## 2022-04-21 PROCEDURE — 36415 COLL VENOUS BLD VENIPUNCTURE: CPT

## 2022-04-21 PROCEDURE — 80069 RENAL FUNCTION PANEL: CPT

## 2022-04-21 PROCEDURE — 97116 GAIT TRAINING THERAPY: CPT

## 2022-04-21 PROCEDURE — 94760 N-INVAS EAR/PLS OXIMETRY 1: CPT

## 2022-04-21 PROCEDURE — 74011250636 HC RX REV CODE- 250/636: Performed by: HOSPITALIST

## 2022-04-21 PROCEDURE — 97530 THERAPEUTIC ACTIVITIES: CPT

## 2022-04-21 PROCEDURE — 85025 COMPLETE CBC W/AUTO DIFF WBC: CPT

## 2022-04-21 PROCEDURE — 74011250636 HC RX REV CODE- 250/636: Performed by: NURSE PRACTITIONER

## 2022-04-21 PROCEDURE — 74011000250 HC RX REV CODE- 250: Performed by: NURSE PRACTITIONER

## 2022-04-21 PROCEDURE — 97535 SELF CARE MNGMENT TRAINING: CPT

## 2022-04-21 PROCEDURE — 77010033678 HC OXYGEN DAILY

## 2022-04-21 PROCEDURE — 83735 ASSAY OF MAGNESIUM: CPT

## 2022-04-21 RX ADMIN — FAMOTIDINE 20 MG: 20 TABLET ORAL at 10:46

## 2022-04-21 RX ADMIN — HYDRALAZINE HYDROCHLORIDE 20 MG: 20 INJECTION INTRAMUSCULAR; INTRAVENOUS at 22:54

## 2022-04-21 RX ADMIN — FENTANYL CITRATE 25 MCG: 0.05 INJECTION, SOLUTION INTRAMUSCULAR; INTRAVENOUS at 22:54

## 2022-04-21 RX ADMIN — METOPROLOL TARTRATE 25 MG: 25 TABLET, FILM COATED ORAL at 10:46

## 2022-04-21 RX ADMIN — METOPROLOL TARTRATE 25 MG: 25 TABLET, FILM COATED ORAL at 18:20

## 2022-04-21 RX ADMIN — FAMOTIDINE 20 MG: 20 TABLET ORAL at 18:20

## 2022-04-21 RX ADMIN — TRAZODONE HYDROCHLORIDE 50 MG: 50 TABLET ORAL at 22:54

## 2022-04-21 NOTE — PROGRESS NOTES
Problem: Self Care Deficits Care Plan (Adult)  Goal: *Acute Goals and Plan of Care (Insert Text)  Description: FUNCTIONAL STATUS PRIOR TO ADMISSION: Patient was independent and active without use of DME, was highly independent for basic and instrumental ADLs. HOME SUPPORT: The patient lived with her spouse (who is unable to physically assist d/t limitations of his own) but did not require assist.    Occupational Therapy Goals  Initiated 4/18/2022  1. Patient will perform grooming at the sink with supervision/set-up within 7 day(s). 2.  Patient will perform bathing with supervision/set-up within 7 day(s). 3.  Patient will perform upper and lower body dressing with supervision/set-up within 7 day(s). 4.  Patient will perform toilet transfers with supervision/set-up within 7 day(s). 5.  Patient will perform all aspects of toileting with supervision/set-up within 7 day(s). 6.  Patient will participate in upper extremity therapeutic exercise/activities with supervision/set-up for 10 minutes with rest breaks PRN within 7 day(s). 7.  Patient will utilize energy conservation techniques during functional activities with verbal and visual cues within 7 day(s). Outcome: Progressing Towards Goal   OCCUPATIONAL THERAPY TREATMENT  Patient: Telma Melvin (34 y.o. female)  Date: 4/21/2022  Diagnosis: Liver hemorrhage [K76.89] Liver hemorrhage       Precautions: Fall  Chart, occupational therapy assessment, plan of care, and goals were reviewed. ASSESSMENT  Patient continues with skilled OT services and is progressing towards goals. Remains with fair balance, poor endurance with fair activity tolerance, fluctuating HR (80s-130) with seated rest breaks required with pacing of tasks. Tolerated up to sink with seated rest required then into bathroom for BM urgency with patient demonstrating poor safety awareness at times with fair balance/RW use with increased A due to fatigue and mild SOB.  Required up to 3L O2 with SPO2 at 87% on room air upon entry and with activity. Would benefit from further acute OT for retraining and possible short rehab stay at SNF vs home with [facility] as patient needs to be mod I as  cannot assist.     Current Level of Function Impacting Discharge (ADLs): up to mod A for ADls, min A for functional transfers    Other factors to consider for discharge:  recently hospitalized          PLAN :  Patient continues to benefit from skilled intervention to address the above impairments. Continue treatment per established plan of care to address goals. Recommend with staff: OOB 3x daily    Recommend next OT session: standing ADLs, pacing    Recommendation for discharge: (in order for the patient to meet his/her long term goals)  Therapy up to 5 days/week in SNF setting vs home with [facility] OT and increased help/aide    This discharge recommendation:  A follow-up discussion with the attending provider and/or case management is planned    IF patient discharges home will need the following DME: AE: long handled bathing, AE: long handled dressing, bedside commode, and shower chair       SUBJECTIVE:   Patient stated I am going to have to sit.     OBJECTIVE DATA SUMMARY:   Cognitive/Behavioral Status:  Neurologic State: Alert  Orientation Level: Oriented X4  Cognition: Impaired decision making; Follows commands;Decreased attention/concentration;Poor safety awareness             Functional Mobility and Transfers for ADLs:  Bed Mobility:  Supine to Sit: Minimum assistance; Additional time  Scooting: Stand-by assistance (to scoot forward at EOB)    Transfers:  Sit to Stand: Contact guard assistance; Additional time; Adaptive equipment  Functional Transfers  Toilet Transfer : Minimum assistance  Cues: Visual/perceptual training/retraining       Balance:  Sitting: Intact; Without support  Standing: Impaired; With support  Standing - Static: Fair  Standing - Dynamic : Fair    ADL Intervention:  Feeding  Drink to Mouth: Independent    Grooming  Position Performed: Standing  Washing Face: Set-up  Washing Hands: Set-up  Cues: Visual/perceptual training/retraining            Patient instructed and indicated understanding the benefits of maintaining activity tolerance, functional mobility, and independence with self care tasks during acute stay  to ensure safe return home and to baseline. Encouraged patient to increase frequency and duration OOB, be out of bed for all meals, perform daily ADLs (as approved by RN/MD regarding bathing etc), and performing functional mobility to/from bathroom. Lower Body Dressing Assistance  Protective Undergarmet: Moderate assistance  Position Performed: Other (comment) (toilet)    Toileting  Bladder Hygiene: Set-up  Bowel Hygiene: Set-up  Clothing Management: Minimum assistance  Cues: Visual/perceptual training/retraining         Therapeutic Exercises:       Pain:  R scapular at rest    Activity Tolerance:   Fair, desaturates with exertion and requires oxygen, requires frequent rest breaks, and observed SOB with activity    After treatment patient left in no apparent distress:   Sitting in chair, Call bell within reach, and Bed / chair alarm activated    COMMUNICATION/COLLABORATION:   The patients plan of care was discussed with: Physical therapist and Registered nurse.      Fred Santa, BARBARA  Time Calculation: 45 mins

## 2022-04-21 NOTE — PROGRESS NOTES
Transition of Care  1. RUR 12% Low  2. Disposition Home Health-referrals pending  3. DME Owns cane and walker  4. Transportation Family  5. Follow Up PCP, Specialist      CM met with patient and  to discuss transitional care plan. Patient is declining SNF at this time and requesting for home health to be arranged. Patient and her family will look into hiring extra help at home. CM contacted patient's daughter Author Matta who confirmed she is actively working to get private duty help at home and has posted on [email or web address]. Author Sigrid states that between she and her siblings they are also working to be able to be with patient the weekend into next week. CM to monitor. Transition of Care Plan:     The Plan for Transition of Care is related to the following treatment goals: Home Health    The Patient and/or patient representative was provided with a choice of provider and agrees  with the discharge plan. Yes [x] No []    A Freedom of choice list was provided with basic dialogue that supports the patient's individualized plan of care/goals and shares the quality data associated with the providers.        Yes [x] No []        Clarice Sheldon MS

## 2022-04-21 NOTE — PROGRESS NOTES
Problem: Mobility Impaired (Adult and Pediatric)  Goal: *Acute Goals and Plan of Care (Insert Text)  Description: FUNCTIONAL STATUS PRIOR TO ADMISSION: Patient was independent and active without use of DME. When asked she endorsed 3-4 falls in the last 12 months. HOME SUPPORT PRIOR TO ADMISSION: The patient lived with her  but did not require assist. Of note he uses a walker. Physical Therapy Goals  Initiated 4/17/2022  1. Patient will move from supine to sit and sit to supine , scoot up and down, and roll side to side in bed with independence within 7 day(s). 2.  Patient will transfer from bed to chair and chair to bed with independence using the least restrictive device within 7 day(s). 3.  Patient will perform sit to stand with independence within 7 day(s). 4.  Patient will ambulate with independence for 150 feet with the least restrictive device within 7 day(s). 5.  Patient will ascend/descend 4 stairs with one handrail(s) with modified independence within 7 day(s). Outcome: Progressing Towards Goal     PHYSICAL THERAPY TREATMENT  Patient: Ranjeet Fuentes (80 y.o. female)  Date: 4/21/2022  Diagnosis: Liver hemorrhage [K76.89] Liver hemorrhage       Precautions: Fall  Chart, physical therapy assessment, plan of care and goals were reviewed. ASSESSMENT  Patient continues with skilled PT services and is slowly progressing towards goals. Demonstrated improved gait with use of rolling walker compared to no device but continues to fatigue quickly and requiring rest breaks throughout. To note, her HR was elevated initially up to the 120s and patient had not yet received her metoprolol. She has had a few recent falls and remains at a high risk for future falls and demonstrates impaired safety awareness. Given limited support available at home, she needs to be more independent prior to discharge home.  At this time would recommend she discharge to subacute rehab to maximize functional potential and independence with functional mobility. Patient currently declining rehab and requesting to discharge home. She would need to make significant progress to be able to manage independently at home given  unable to physically assist and children unable to provided consistent support. Acute PT will continue to follow and progress as able. She may do better next session following receiving her medications for HR. Current Level of Function Impacting Discharge (mobility/balance): minimal assist with supine to sit, short gait distance, desaturating on room air    Other factors to consider for discharge: new oxygen requirement,  recently discharged from hospital and using walker, children unable to provided consistent support, patient wants to go home not rehab         PLAN :  Patient continues to benefit from skilled intervention to address the above impairments. Continue treatment per established plan of care. to address goals. Recommendation for discharge: (in order for the patient to meet his/her long term goals)  Therapy up to 5 days/week in SNF setting   Patient currently declining SNF - if discharge home against PT recommendation would need assist at home for mobility/ADLs/IADLS, and HHPT    This discharge recommendation:  Has been made in collaboration with the attending provider and/or case management (discussed with MD 4/20)    IF patient discharges home will need the following DME: patient owns DME required for discharge       SUBJECTIVE:   Patient stated I'm feeling my heart rate go up.     OBJECTIVE DATA SUMMARY:   Critical Behavior:  Neurologic State: Alert  Orientation Level: Oriented X4  Cognition: Impaired decision making,Follows commands,Decreased attention/concentration,Poor safety awareness  Safety/Judgement: Awareness of environment,Decreased awareness of need for assistance,Decreased awareness of need for safety,Decreased insight into deficits  Functional Mobility Training:  Bed Mobility:  Supine to Sit: Minimum assistance; Additional time  Scooting: Stand-by assistance (to scoot forward at EOB)  Transfers:  Sit to Stand: Contact guard assistance; Additional time; Adaptive equipment  Stand to Sit: Contact guard assistance; Additional time; Adaptive equipment (cues)  Balance:  Sitting: Intact; Without support  Standing: Impaired; With support  Standing - Static: Fair  Standing - Dynamic : Fair  Ambulation/Gait Training:  Distance (ft): 10 Feet (ft) (x 2)  Assistive Device: Gait belt;Walker, rolling (needing cues and assist for walker management)  Ambulation - Level of Assistance: Minimal assistance;Contact guard assistance; Adaptive equipment;Assist x1  Gait Abnormalities: Decreased step clearance (unsteady)  Base of Support: Narrowed  Speed/Lissett: Slow  Step Length: Left shortened;Right shortened    Therapeutic Exercises:   Sitting and standing balance while performing ADLs    Pain Rating:  Continues to endorse R shoulder pain this date although improved from previous session    Activity Tolerance:   Fair, desaturates with exertion and requires oxygen, and requires rest breaks  HR 110s initially increasing to 120s with minimal activity, improved with rest and time. HR 80s-90s at end of session. RN aware and plan to give patient metoprolol. Patient on room air upon arrival with SpO2 87-88%. Required up to 3L of O2 via NC for session    After treatment patient left in no apparent distress:   Sitting in chair, Call bell within reach, and Bed / chair alarm activated    COMMUNICATION/COLLABORATION:   The patients plan of care was discussed with: Occupational therapist and Registered nurse.      Lyndsey Franklin, PT   Time Calculation: 43 mins

## 2022-04-21 NOTE — PROGRESS NOTES
Bedside and Verbal shift change report given to Walter Myers RN (oncoming nurse) by Margaret Blackwell RN (offgoing nurse). Report included the following information SBAR, Kardex, ED Summary, Procedure Summary, Intake/Output, MAR, Recent Results and Cardiac Rhythm NSR.

## 2022-04-21 NOTE — PROGRESS NOTES
Hospitalist Progress Note      Hospital summary: 80 y.o lady w/ cirrhosis, PUD, HTN, who presented with liver hemorrhage after a transjugular liver biopsy. Assessment/Plan:    Hepatic hemorrhage s/p angiogram with coiling. Acute blood loss anemia. -CT ABD/PELV: demonstrated \"active intrahepatic arterial contrast extravasation/bleeding, with surrounding intraparenchymal hematoma. There is large quantity hemoperitoneum\". -monitor H/H - slow downtrend -  S/p PRBC now Hgb >8  -General Surgery following, appreciate recs     HTN  -continue home metoprolol  -increased PRN IV hydralazine     PUD:   -Continue Pepcid     Cirrhosis:  -No hx of HE, not on home lactulose or rifaximin  -has chronic edema which she states is from venous insufficiency. Given one dose of IV furosemide on 4/17 with improvement. Will hold additional diuretics.   -some diastolic dysfunction noted on echo  -Cleared by Cardiology    Hypokalemia- replace as needed    Code status: full  DVT prophylaxis: SCDs  PTA: home with   On patient's request called Dawood Scott at [phone] but could not talk    Plan: Discharge to home with home kiarra when cleared by PT/OT  Disposition: likely home w/ home health once medically stable and cleared by PT OT  ----------------------------------------------    CC: abdominal pain    S: says back pain is better than yesterday 5/10    O:  Visit Vitals  BP (!) 151/64 (BP 1 Location: Left upper arm, BP Patient Position: At rest)   Pulse (!) 102   Temp 98.6 °F (37 °C)   Resp 22   Ht 5' 4\" (1.626 m)   Wt 55.2 kg (121 lb 11.1 oz)   SpO2 96%   Breastfeeding No   BMI 20.89 kg/m²       PHYSICAL EXAM:  Gen: Thin built and nourished  HEENT:  pale conjunctiva  Neck: supple   Heart: RRR,   Lungs:  non-labored respirations  Abd: soft, RUQ tenderness,    Extr: warm  Skin:  no rash  Neuro:  normal speech, moves all extremities  Psych:  Flat affect      Intake/Output Summary (Last 24 hours) at 4/21/2022 1110  Last data filed at 4/20/2022 2136  Gross per 24 hour   Intake 500 ml   Output 700 ml   Net -200 ml        Recent labs & imaging reviewed:    Recent Labs     04/21/22  0318 04/20/22  0338 04/19/22  1732   * 136 131*   K 3.9 3.3* 4.0    103 102   CO2 28 27 23   BUN 18 14 15   CREA 0.48* 0.59 0.48*   * 98 83   CA 8.5 8.9 8.4*   MG 2.0  --  2.0   PHOS 1.9*  --   --        Med list reviewed  Current Facility-Administered Medications   Medication Dose Route Frequency    0.9% sodium chloride infusion 250 mL  250 mL IntraVENous PRN    dilTIAZem (CARDIZEM) injection 5 mg  5 mg IntraVENous PRN    hydrALAZINE (APRESOLINE) 20 mg/mL injection 20 mg  20 mg IntraVENous Q6H PRN    lidocaine 4 % patch 1 Patch  1 Patch TransDERmal Q24H    acetaminophen (TYLENOL) tablet 650 mg  650 mg Oral Q4H PRN    fentaNYL citrate (PF) injection 25 mcg  25 mcg IntraVENous Q2H PRN    traZODone (DESYREL) tablet 50 mg  50 mg Oral QHS    famotidine (PEPCID) tablet 20 mg  20 mg Oral BID    metoprolol tartrate (LOPRESSOR) tablet 25 mg  25 mg Oral BID    0.9% sodium chloride infusion 250 mL  250 mL IntraVENous PRN    iopamidoL (ISOVUE 300) 61 % contrast injection 50 mL  50 mL IntraCATHeter RAD PRN    0.9% sodium chloride infusion 250 mL  250 mL IntraVENous PRN         Samantha Louis MD  Internal Medicine  Date of Service: 4/21/2022

## 2022-04-21 NOTE — PROGRESS NOTES
Problem: Pressure Injury - Risk of  Goal: *Prevention of pressure injury  Description: Document Stefan Scale and appropriate interventions in the flowsheet. Outcome: Progressing Towards Goal  Note: Pressure Injury Interventions:  Sensory Interventions: Assess changes in LOC,Keep linens dry and wrinkle-free,Minimize linen layers    Moisture Interventions: Absorbent underpads,Internal/External urinary devices,Minimize layers    Activity Interventions: Pressure redistribution bed/mattress(bed type)    Mobility Interventions: HOB 30 degrees or less,Pressure redistribution bed/mattress (bed type)    Nutrition Interventions: Document food/fluid/supplement intake    Friction and Shear Interventions: HOB 30 degrees or less                Problem: Patient Education: Go to Patient Education Activity  Goal: Patient/Family Education  Outcome: Progressing Towards Goal     Problem: Falls - Risk of  Goal: *Absence of Falls  Description: Document Dimas Fall Risk and appropriate interventions in the flowsheet.   Outcome: Progressing Towards Goal  Note: Fall Risk Interventions:  Mobility Interventions: Bed/chair exit alarm,Patient to call before getting OOB,Communicate number of staff needed for ambulation/transfer         Medication Interventions: Bed/chair exit alarm,Patient to call before getting OOB,Teach patient to arise slowly    Elimination Interventions: Bed/chair exit alarm,Call light in reach,Patient to call for help with toileting needs    History of Falls Interventions: Bed/chair exit alarm,Evaluate medications/consider consulting pharmacy         Problem: Patient Education: Go to Patient Education Activity  Goal: Patient/Family Education  Outcome: Progressing Towards Goal

## 2022-04-21 NOTE — PROGRESS NOTES
Bedside shift change report given to [address] [place] [address] (oncoming nurse) by Sean Krishnan (offgoing nurse). Report included the following information SBAR, Kardex, Cardiac Rhythm, I/O, Mobility, and right shoulder pain . At shift change pt is reporting right shoulder pain at rate of 5/10 only with movement of right arm. Pt BP at shift change noted to be SBP in 150's. Pt does have PRN hydralazine IVP for SBP greater than 160. [place] RN aware of HTN and yesterday's rapid response called.

## 2022-04-22 PROCEDURE — 97530 THERAPEUTIC ACTIVITIES: CPT

## 2022-04-22 PROCEDURE — 74011250637 HC RX REV CODE- 250/637: Performed by: NURSE PRACTITIONER

## 2022-04-22 PROCEDURE — 97116 GAIT TRAINING THERAPY: CPT

## 2022-04-22 PROCEDURE — 77010033678 HC OXYGEN DAILY

## 2022-04-22 PROCEDURE — 65270000046 HC RM TELEMETRY

## 2022-04-22 PROCEDURE — 94760 N-INVAS EAR/PLS OXIMETRY 1: CPT

## 2022-04-22 PROCEDURE — 74011250636 HC RX REV CODE- 250/636: Performed by: NURSE PRACTITIONER

## 2022-04-22 PROCEDURE — 97535 SELF CARE MNGMENT TRAINING: CPT

## 2022-04-22 PROCEDURE — 74011000250 HC RX REV CODE- 250: Performed by: NURSE PRACTITIONER

## 2022-04-22 RX ADMIN — METOPROLOL TARTRATE 25 MG: 25 TABLET, FILM COATED ORAL at 17:50

## 2022-04-22 RX ADMIN — METOPROLOL TARTRATE 25 MG: 25 TABLET, FILM COATED ORAL at 09:54

## 2022-04-22 RX ADMIN — FAMOTIDINE 20 MG: 20 TABLET ORAL at 17:50

## 2022-04-22 RX ADMIN — FAMOTIDINE 20 MG: 20 TABLET ORAL at 09:54

## 2022-04-22 RX ADMIN — FENTANYL CITRATE 25 MCG: 0.05 INJECTION, SOLUTION INTRAMUSCULAR; INTRAVENOUS at 21:58

## 2022-04-22 RX ADMIN — TRAZODONE HYDROCHLORIDE 50 MG: 50 TABLET ORAL at 21:58

## 2022-04-22 NOTE — PROGRESS NOTES
Problem: Self Care Deficits Care Plan (Adult)  Goal: *Acute Goals and Plan of Care (Insert Text)  Description: FUNCTIONAL STATUS PRIOR TO ADMISSION: Patient was independent and active without use of DME, was highly independent for basic and instrumental ADLs. HOME SUPPORT: The patient lived with her spouse (who is unable to physically assist d/t limitations of his own) but did not require assist.    Occupational Therapy Goals  Initiated 4/18/2022  1. Patient will perform grooming at the sink with supervision/set-up within 7 day(s). 2.  Patient will perform bathing with supervision/set-up within 7 day(s). 3.  Patient will perform upper and lower body dressing with supervision/set-up within 7 day(s). 4.  Patient will perform toilet transfers with supervision/set-up within 7 day(s). 5.  Patient will perform all aspects of toileting with supervision/set-up within 7 day(s). 6.  Patient will participate in upper extremity therapeutic exercise/activities with supervision/set-up for 10 minutes with rest breaks PRN within 7 day(s). 7.  Patient will utilize energy conservation techniques during functional activities with verbal and visual cues within 7 day(s). Outcome: Progressing Towards Goal   OCCUPATIONAL THERAPY TREATMENT  Patient: Thomas Mariee (65 y.o. female)  Date: 4/22/2022  Diagnosis: Liver hemorrhage [K76.89] Liver hemorrhage       Precautions: Fall  Chart, occupational therapy assessment, plan of care, and goals were reviewed. ASSESSMENT  Patient continues with skilled OT services and is progressing towards goals. Patient received working with PT, agreeable to continuing session with OT. Reviewed energy conservation techniques with patient verbalizing understanding and receptive to massaging bath cap while seated prior to transitioning to standing at sink.  Patient benefits from up to mod assist for massaging bath cap for approx 5 minutes 2/2 decreased endurance, then ambulating short distance to bathroom for standing grooming with overall SBA-CGA for safety. Patient benefits from occasional cues for activity pacing and identifying most energy-effective means of performing a task. Note HR better controlled today, fluctuating 80s-90s. Given limited support at home and patient remaining below independent baseline, recommend discharge to SNF rehab to maximize functional gains. Current Level of Function Impacting Discharge (ADLs): CGA    Other factors to consider for discharge: limited endurance; high fall risk; limited support at home and [facility] in home area         PLAN :  Patient continues to benefit from skilled intervention to address the above impairments. Continue treatment per established plan of care to address goals. Recommendation for discharge: (in order for the patient to meet his/her long term goals)  Therapy up to 5 days/week in SNF setting    This discharge recommendation:  Has been made in collaboration with the attending provider and/or case management    IF patient discharges home will need the following DME: TBD       SUBJECTIVE:   Patient stated i'm so glad you mentioned this! \" in reference to bath cap for washing hair. OBJECTIVE DATA SUMMARY:   Cognitive/Behavioral Status:  Neurologic State: Alert; Appropriate for age  Orientation Level: Oriented X4  Cognition: Follows commands  Perception: Appears intact  Perseveration: No perseveration noted  Safety/Judgement: Awareness of environment; Fall prevention;Decreased insight into deficits    Functional Mobility and Transfers for ADLs:  Bed Mobility:       Transfers:  Sit to Stand: Stand-by assistance  Functional Transfers  Bathroom Mobility: Stand-by assistance  Cues: Verbal cues provided;Visual cues provided     Balance:  Sitting: Intact; Without support  Standing: Impaired  Standing - Static: Good (with and without support)  Standing - Dynamic : Good;Fair (with and without support)    ADL Intervention:  Grooming  Grooming Assistance: Set-up; Stand-by assistance (standing at bathroom sink)  Position Performed: Standing  Washing Face: Stand-by assistance  Washing Hands: Stand-by assistance  Brushing Teeth: Stand-by assistance  Brushing/Combing Hair: Moderate assistance (to massage bath cap for extended period)  Cues: Verbal cues provided;Visual cues provided (for pacing)    Cognitive Retraining  Organizing/Sequencing: Prioritizing  Safety/Judgement: Awareness of environment; Fall prevention;Decreased insight into deficits    Pain:  No reports. Activity Tolerance:   Fair, requires rest breaks, and observed SOB with activity    After treatment patient left in no apparent distress:   Sitting in chair, Call bell within reach, Bed / chair alarm activated, and Caregiver / family present    COMMUNICATION/COLLABORATION:   The patients plan of care was discussed with: Physical therapist and Registered nurse.      Arsenio Haines OT  Time Calculation: 32 mins

## 2022-04-22 NOTE — PROGRESS NOTES
Hospitalist Progress Note      Hospital summary: 80 y.o lady w/ cirrhosis, PUD, HTN, who presented with liver hemorrhage after a transjugular liver biopsy. Assessment/Plan:    Hepatic hemorrhage s/p angiogram with coiling. Acute blood loss anemia. -CT ABD/PELV: demonstrated \"active intrahepatic arterial contrast extravasation/bleeding, with surrounding intraparenchymal hematoma. There is large quantity hemoperitoneum\". -monitor H/H - slow downtrend -  S/p PRBC now Hgb >8  -General Surgery following, appreciate recs     HTN  -continue home metoprolol  -increased PRN IV hydralazine     PUD:   -Continue Pepcid     Cirrhosis:  -No hx of HE, not on home lactulose or rifaximin  -has chronic edema which she states is from venous insufficiency. Given one dose of IV furosemide on 4/17 with improvement. Will hold additional diuretics. -some diastolic dysfunction noted on echo  -Cleared by Cardiology    Hypokalemia- replace as needed    Code status: full  DVT prophylaxis: SCDs  PTA: home with   On patient's request called Jarred Armas at [phone] but could not talk    Plan: Discharge to home with home kiarra when private care arranged.  The patient still does not want to go to SNF  Disposition: likely home w/ home health once medically stable and cleared by PT OT  ----------------------------------------------    CC: abdominal pain    S: says back pain is better than yesterday 5/10    O:  Visit Vitals  BP (!) 159/70   Pulse 94   Temp 97.8 °F (36.6 °C)   Resp 28   Ht 5' 4\" (1.626 m)   Wt 55.4 kg (122 lb 1.6 oz)   SpO2 95%   Breastfeeding No   BMI 20.96 kg/m²       PHYSICAL EXAM:  Gen: Thin built and nourished  HEENT:  pale conjunctiva  Neck: supple   Heart: RRR,   Lungs:  non-labored respirations  Abd: soft, RUQ tenderness,    Extr: warm  Skin:  no rash  Neuro:  normal speech, moves all extremities  Psych:  Flat affect    No intake or output data in the 24 hours ending 04/22/22 0933 Recent labs & imaging reviewed:    Recent Labs     04/21/22  0318 04/20/22  0338 04/19/22  1732   * 136 131*   K 3.9 3.3* 4.0    103 102   CO2 28 27 23   BUN 18 14 15   CREA 0.48* 0.59 0.48*   * 98 83   CA 8.5 8.9 8.4*   MG 2.0  --  2.0   PHOS 1.9*  --   --        Med list reviewed  Current Facility-Administered Medications   Medication Dose Route Frequency    0.9% sodium chloride infusion 250 mL  250 mL IntraVENous PRN    dilTIAZem (CARDIZEM) injection 5 mg  5 mg IntraVENous PRN    hydrALAZINE (APRESOLINE) 20 mg/mL injection 20 mg  20 mg IntraVENous Q6H PRN    lidocaine 4 % patch 1 Patch  1 Patch TransDERmal Q24H    acetaminophen (TYLENOL) tablet 650 mg  650 mg Oral Q4H PRN    fentaNYL citrate (PF) injection 25 mcg  25 mcg IntraVENous Q2H PRN    traZODone (DESYREL) tablet 50 mg  50 mg Oral QHS    famotidine (PEPCID) tablet 20 mg  20 mg Oral BID    metoprolol tartrate (LOPRESSOR) tablet 25 mg  25 mg Oral BID    0.9% sodium chloride infusion 250 mL  250 mL IntraVENous PRN    iopamidoL (ISOVUE 300) 61 % contrast injection 50 mL  50 mL IntraCATHeter RAD PRN    0.9% sodium chloride infusion 250 mL  250 mL IntraVENous PRN         Sofia Hernandez MD  Internal Medicine  Date of Service: 4/22/2022

## 2022-04-22 NOTE — PROGRESS NOTES
Bedside and Verbal shift change report given to KENNETH Judd (oncoming nurse) by Gonzales Rosas (offgoing nurse). Report included the following information SBAR, Kardex, ED Summary, Procedure Summary, Intake/Output, MAR, Recent Results and Cardiac Rhythm NSR. Lawanda Riojas

## 2022-04-22 NOTE — PROGRESS NOTES
Bedside shift change report given to Yves Finnegan RN (oncoming nurse) by Joy aLn RN (offgoing nurse).  Report included the following information SBAR, Kardex, Intake/Output, MAR, Recent Results and Cardiac Rhythm NSR; ST.

## 2022-04-22 NOTE — PROGRESS NOTES
Transition of Care  1. RUR 12% Low  2. Disposition [address] needed  3. DME Owns cane and walker and possibly home oxygen  4. Transportation Family vs Medical  5. Follow Up PCP, Specialist      CM met with patient to inform that home health has not been able to be arranged at this time due to location and insurance barriers. Patient is open to rehab if there are not any other options but would like CM to follow up with her daughter's Jabari Reyes and Flash Fonseca. CM to monitor. Dennis Umaña, MS    3:14 CM placed follow up call to daughter Jabari Reyes. Jabari Reyes provided choice for [address]. CM met with patient,  and daughter Flash Fonseca. Additional SNF list provided. CM advised on the need to provide additional SNF choices. Referral placed to [facility] [place] and follow up call attempted to [facility] in admissions. CM left voice message and will continue to follow. Transition of Care Plan:     The Plan for Transition of Care is related to the following treatment goals: SNF rehab    The Patient and/or patient representative was provided with a choice of provider and agrees  with the discharge plan. Yes [x] No []    A Freedom of choice list was provided with basic dialogue that supports the patient's individualized plan of care/goals and shares the quality data associated with the providers. Yes [x] No []    Stephanie Arora, MS    4:30 CM received phone call from [facility] at [facility] [place]. Referral faxed to [phone]. [facility] will submit for auth when fax received and can be reached over the weekend for DC coordination at [phone]. CM to monitor.      Stephanie Arora, MS

## 2022-04-22 NOTE — PROGRESS NOTES
Problem: Mobility Impaired (Adult and Pediatric)  Goal: *Acute Goals and Plan of Care (Insert Text)  Description: FUNCTIONAL STATUS PRIOR TO ADMISSION: Patient was independent and active without use of DME. When asked she endorsed 3-4 falls in the last 12 months. HOME SUPPORT PRIOR TO ADMISSION: The patient lived with her  but did not require assist. Of note he uses a walker. Physical Therapy Goals  Initiated 4/17/2022  1. Patient will move from supine to sit and sit to supine , scoot up and down, and roll side to side in bed with independence within 7 day(s). 2.  Patient will transfer from bed to chair and chair to bed with independence using the least restrictive device within 7 day(s). 3.  Patient will perform sit to stand with independence within 7 day(s). 4.  Patient will ambulate with independence for 150 feet with the least restrictive device within 7 day(s). 5.  Patient will ascend/descend 4 stairs with one handrail(s) with modified independence within 7 day(s). Outcome: Progressing Towards Goal     PHYSICAL THERAPY TREATMENT  Patient: Arben Mukherjee (32 y.o. female)  Date: 4/22/2022  Diagnosis: Liver hemorrhage [K76.89] Liver hemorrhage       Precautions: Fall  Chart, physical therapy assessment, plan of care and goals were reviewed. ASSESSMENT  Patient continues with skilled PT services and is progressing towards goals. Able to progress gait distance and increase standing tolerance this date on room air. Remains unsteady and below baseline and is most limited by general weakness, impaired balance, impaired activity tolerance, and impaired endurance. HR improved with activity this date (to note, got morning metoprolol). She has had a few recent falls and remains at a high risk for future falls and demonstrates impaired safety awareness. Given limited support available at home, she needs to be more independent prior to discharge home.  At this time would recommend she discharge to subacute rehab to maximize functional potential and independence with functional mobility especially given  unable to provide physical assist and patient unable to get HHPT due to location. Current Level of Function Impacting Discharge (mobility/balance): contact guard to ambulate 60 ft with multiple standing breaks and rolling walker    Other factors to consider for discharge:  recently discharged from hospital and using walker, children unable to provided consistent support, unable to obtain HHPT         PLAN :  Patient continues to benefit from skilled intervention to address the above impairments. Continue treatment per established plan of care. to address goals. Recommendation for discharge: (in order for the patient to meet his/her long term goals)  Therapy up to 5 days/week in SNF setting    This discharge recommendation:  Has been made in collaboration with the attending provider and/or case management    IF patient discharges home will need the following DME: patient owns DME required for discharge       SUBJECTIVE:   Patient stated I think it would be best to go to rehab.     OBJECTIVE DATA SUMMARY:   Critical Behavior:  Neurologic State: Alert  Orientation Level: Oriented X4  Cognition: Follows commands,Appropriate decision making  Safety/Judgement: Awareness of environment,Decreased awareness of need for assistance,Decreased awareness of need for safety,Decreased insight into deficits  Functional Mobility Training:  Transfers:  Sit to Stand: Stand-by assistance  Stand to Sit: Stand-by assistance  Balance:  Sitting: Intact; Without support  Standing: Impaired  Standing - Static: Good (with and without support)  Standing - Dynamic : Good;Fair (with and without support)  Ambulation/Gait Training:  Distance (ft): 60 Feet (ft) (a  few standing rest breaks)  Ambulation - Level of Assistance: Contact guard assistance; Additional time (unsteady)  Gait Abnormalities: Decreased step clearance  Base of Support: Narrowed  Speed/Lissett: Fluctuations;Slow;Shuffled  Step Length: Left shortened;Right shortened   Pain Rating:  Denies    Activity Tolerance:   Fair and requires rest breaks, standing rest breaks during gait. Seated rest break between gait and standing balance/tolerance performing ADLs at sink. Patient on room air and SpO2 decreased to 89-90% briefly but improved to 95% on room air with cues for deep breathing    After treatment patient left in no apparent distress:   Sitting in chair, Call bell within reach, Bed / chair alarm activated and Caregiver / family present    COMMUNICATION/COLLABORATION:   The patients plan of care was discussed with: Occupational therapist, Registered nurse and Case management.      Toi Connolly, PT   Time Calculation: 37 mins

## 2022-04-23 PROCEDURE — 94760 N-INVAS EAR/PLS OXIMETRY 1: CPT

## 2022-04-23 PROCEDURE — 77010033678 HC OXYGEN DAILY

## 2022-04-23 PROCEDURE — 65270000046 HC RM TELEMETRY

## 2022-04-23 PROCEDURE — 74011000250 HC RX REV CODE- 250: Performed by: NURSE PRACTITIONER

## 2022-04-23 PROCEDURE — 74011250637 HC RX REV CODE- 250/637: Performed by: NURSE PRACTITIONER

## 2022-04-23 PROCEDURE — 74011250636 HC RX REV CODE- 250/636: Performed by: NURSE PRACTITIONER

## 2022-04-23 PROCEDURE — 74011250637 HC RX REV CODE- 250/637: Performed by: HOSPITALIST

## 2022-04-23 RX ORDER — METOPROLOL TARTRATE 50 MG/1
50 TABLET ORAL 2 TIMES DAILY
Status: DISCONTINUED | OUTPATIENT
Start: 2022-04-23 | End: 2022-04-26 | Stop reason: HOSPADM

## 2022-04-23 RX ADMIN — METOPROLOL TARTRATE 50 MG: 50 TABLET ORAL at 17:09

## 2022-04-23 RX ADMIN — ACETAMINOPHEN 650 MG: 325 TABLET ORAL at 12:45

## 2022-04-23 RX ADMIN — METOPROLOL TARTRATE 50 MG: 50 TABLET ORAL at 09:10

## 2022-04-23 RX ADMIN — FENTANYL CITRATE 25 MCG: 0.05 INJECTION, SOLUTION INTRAMUSCULAR; INTRAVENOUS at 22:21

## 2022-04-23 RX ADMIN — TRAZODONE HYDROCHLORIDE 50 MG: 50 TABLET ORAL at 22:21

## 2022-04-23 RX ADMIN — FAMOTIDINE 20 MG: 20 TABLET ORAL at 09:10

## 2022-04-23 RX ADMIN — FAMOTIDINE 20 MG: 20 TABLET ORAL at 17:09

## 2022-04-23 NOTE — PROGRESS NOTES
Bedside and Verbal shift change report given to Joann Ramsay RN (oncoming nurse) by Shadia Prado RN (offgoing nurse). Report included the following information SBAR, Kardex, ED Summary, Procedure Summary, Intake/Output, MAR, Recent Results and Cardiac Rhythm NSR.

## 2022-04-23 NOTE — PROGRESS NOTES
Hospitalist Progress Note      Hospital summary: 80 y.o lady w/ cirrhosis, PUD, HTN, who presented with liver hemorrhage after a transjugular liver biopsy. Assessment/Plan:    Hepatic hemorrhage s/p angiogram with coiling. Acute blood loss anemia. -CT ABD/PELV: demonstrated \"active intrahepatic arterial contrast extravasation/bleeding, with surrounding intraparenchymal hematoma. There is large quantity hemoperitoneum\". -monitor H/H - slow downtrend -  S/p PRBC now Hgb >8  -General Surgery following, appreciate recs     Fever 4/22-continue to monitor clinically hold antibiotics for now. Check CBC Pro-Juanjose.  If spikes again will do panculture. HTN  -continue home metoprolol  -increased PRN IV hydralazine  -BP intermittently elevated continue to monitor adjust accordingly in 24hrs       PUD:   -Continue Pepcid     Cirrhosis:  -No hx of HE, not on home lactulose or rifaximin  -has chronic edema which she states is from venous insufficiency. Given one dose of IV furosemide on 4/17 with improvement. Will hold additional diuretics. -some diastolic dysfunction noted on echo  -Cleared by Cardiology    Hypokalemia- replace as needed    Code status: full  DVT prophylaxis: SCDs  PTA: home with   Kelvin Portillo at [phone]      Plan: Discharge to home with home kiarra when private care arranged. The patient still does not want to go to SNF  Disposition: likely home w/ home health once medically stable and cleared by PT OT  ----------------------------------------------    CC: abdominal pain    S: Patient had a low-grade fever last night. Blood pressure remains elevated. She was comfortable in bed. Denies cough, shortness of breath, nausea, vomiting, diarrhea. No other acute issues reported to me by staff at this time. She does not feel well enough to go home at this time.   She feels like she would agree to go to rehab.    O:  Visit Vitals  BP (!) 166/68 (BP 1 Location: Left upper arm, BP Patient Position: At rest)   Pulse 87   Temp 99.4 °F (37.4 °C)   Resp 29   Ht 5' 4\" (1.626 m)   Wt 60.1 kg (132 lb 8 oz)   SpO2 94%   Breastfeeding No   BMI 22.74 kg/m²       PHYSICAL EXAM:  Gen: Thin built and nourished  HEENT:  pale conjunctiva  Neck: supple   Heart: RRR,   Lungs:  non-labored respirations  Abd: soft, RUQ tenderness,    Extr: warm  Skin:  no rash  Neuro:  normal speech, moves all extremities  Psych:  Flat affect      Intake/Output Summary (Last 24 hours) at 4/23/2022 0730  Last data filed at 4/22/2022 0816  Gross per 24 hour   Intake 240 ml   Output 400 ml   Net -160 ml        Recent labs & imaging reviewed:    Recent Labs     04/21/22  0318   *   K 3.9      CO2 28   BUN 18   CREA 0.48*   *   CA 8.5   MG 2.0   PHOS 1.9*       Med list reviewed  Current Facility-Administered Medications   Medication Dose Route Frequency    0.9% sodium chloride infusion 250 mL  250 mL IntraVENous PRN    dilTIAZem (CARDIZEM) injection 5 mg  5 mg IntraVENous PRN    hydrALAZINE (APRESOLINE) 20 mg/mL injection 20 mg  20 mg IntraVENous Q6H PRN    lidocaine 4 % patch 1 Patch  1 Patch TransDERmal Q24H    acetaminophen (TYLENOL) tablet 650 mg  650 mg Oral Q4H PRN    fentaNYL citrate (PF) injection 25 mcg  25 mcg IntraVENous Q2H PRN    traZODone (DESYREL) tablet 50 mg  50 mg Oral QHS    famotidine (PEPCID) tablet 20 mg  20 mg Oral BID    metoprolol tartrate (LOPRESSOR) tablet 25 mg  25 mg Oral BID    0.9% sodium chloride infusion 250 mL  250 mL IntraVENous PRN    iopamidoL (ISOVUE 300) 61 % contrast injection 50 mL  50 mL IntraCATHeter RAD PRN    0.9% sodium chloride infusion 250 mL  250 mL IntraVENous PRN         Scotty García MD  Internal Medicine  Date of Service: 4/23/2022

## 2022-04-23 NOTE — PROGRESS NOTES
Bedside and Verbal shift change report given to MARIE FloresN (oncoming nurse) by Nivia Patel (offgoing nurse). Report included the following information SBAR, Kardex, ED Summary, Procedure Summary, Intake/Output, MAR, Recent Results and Cardiac Rhythm NSR.

## 2022-04-24 LAB
ALBUMIN SERPL-MCNC: 2.3 G/DL (ref 3.5–5)
ANION GAP SERPL CALC-SCNC: 5 MMOL/L (ref 5–15)
BASOPHILS # BLD: 0 K/UL (ref 0–0.1)
BASOPHILS NFR BLD: 0 % (ref 0–1)
BUN SERPL-MCNC: 13 MG/DL (ref 6–20)
BUN/CREAT SERPL: 28 (ref 12–20)
CALCIUM SERPL-MCNC: 8.1 MG/DL (ref 8.5–10.1)
CHLORIDE SERPL-SCNC: 104 MMOL/L (ref 97–108)
CO2 SERPL-SCNC: 26 MMOL/L (ref 21–32)
CREAT SERPL-MCNC: 0.47 MG/DL (ref 0.55–1.02)
CRP SERPL HS-MCNC: >9.5 MG/L
DIFFERENTIAL METHOD BLD: ABNORMAL
EOSINOPHIL # BLD: 0 K/UL (ref 0–0.4)
EOSINOPHIL NFR BLD: 0 % (ref 0–7)
ERYTHROCYTE [DISTWIDTH] IN BLOOD BY AUTOMATED COUNT: 16.1 % (ref 11.5–14.5)
GLUCOSE SERPL-MCNC: 85 MG/DL (ref 65–100)
HCT VFR BLD AUTO: 29.2 % (ref 35–47)
HGB BLD-MCNC: 9.3 G/DL (ref 11.5–16)
IMM GRANULOCYTES # BLD AUTO: 0 K/UL
IMM GRANULOCYTES NFR BLD AUTO: 0 %
LYMPHOCYTES # BLD: 1.8 K/UL (ref 0.8–3.5)
LYMPHOCYTES NFR BLD: 13 % (ref 12–49)
MAGNESIUM SERPL-MCNC: 2.1 MG/DL (ref 1.6–2.4)
MCH RBC QN AUTO: 29.7 PG (ref 26–34)
MCHC RBC AUTO-ENTMCNC: 31.8 G/DL (ref 30–36.5)
MCV RBC AUTO: 93.3 FL (ref 80–99)
METAMYELOCYTES NFR BLD MANUAL: 1 %
MONOCYTES # BLD: 1.4 K/UL (ref 0–1)
MONOCYTES NFR BLD: 10 % (ref 5–13)
NEUTS SEG # BLD: 10.4 K/UL (ref 1.8–8)
NEUTS SEG NFR BLD: 76 % (ref 32–75)
NRBC # BLD: 0 K/UL (ref 0–0.01)
NRBC BLD-RTO: 0 PER 100 WBC
PHOSPHATE SERPL-MCNC: 2.4 MG/DL (ref 2.6–4.7)
PLATELET # BLD AUTO: 360 K/UL (ref 150–400)
PMV BLD AUTO: 9.4 FL (ref 8.9–12.9)
POTASSIUM SERPL-SCNC: 4 MMOL/L (ref 3.5–5.1)
PROCALCITONIN SERPL-MCNC: 0.51 NG/ML
RBC # BLD AUTO: 3.13 M/UL (ref 3.8–5.2)
RBC MORPH BLD: ABNORMAL
SODIUM SERPL-SCNC: 135 MMOL/L (ref 136–145)
WBC # BLD AUTO: 13.7 K/UL (ref 3.6–11)

## 2022-04-24 PROCEDURE — 74011000250 HC RX REV CODE- 250: Performed by: NURSE PRACTITIONER

## 2022-04-24 PROCEDURE — 74011250637 HC RX REV CODE- 250/637: Performed by: HOSPITALIST

## 2022-04-24 PROCEDURE — 65270000046 HC RM TELEMETRY

## 2022-04-24 PROCEDURE — 36415 COLL VENOUS BLD VENIPUNCTURE: CPT

## 2022-04-24 PROCEDURE — 83735 ASSAY OF MAGNESIUM: CPT

## 2022-04-24 PROCEDURE — 94760 N-INVAS EAR/PLS OXIMETRY 1: CPT

## 2022-04-24 PROCEDURE — 85025 COMPLETE CBC W/AUTO DIFF WBC: CPT

## 2022-04-24 PROCEDURE — 84145 PROCALCITONIN (PCT): CPT

## 2022-04-24 PROCEDURE — 86141 C-REACTIVE PROTEIN HS: CPT

## 2022-04-24 PROCEDURE — 74011250637 HC RX REV CODE- 250/637: Performed by: NURSE PRACTITIONER

## 2022-04-24 PROCEDURE — 80069 RENAL FUNCTION PANEL: CPT

## 2022-04-24 PROCEDURE — 74011250636 HC RX REV CODE- 250/636: Performed by: NURSE PRACTITIONER

## 2022-04-24 RX ORDER — FACIAL-BODY WIPES
10 EACH TOPICAL DAILY PRN
Status: DISCONTINUED | OUTPATIENT
Start: 2022-04-24 | End: 2022-04-26 | Stop reason: HOSPADM

## 2022-04-24 RX ORDER — FUROSEMIDE 40 MG/1
40 TABLET ORAL DAILY
Status: DISCONTINUED | OUTPATIENT
Start: 2022-04-24 | End: 2022-04-26 | Stop reason: HOSPADM

## 2022-04-24 RX ADMIN — FAMOTIDINE 20 MG: 20 TABLET ORAL at 09:18

## 2022-04-24 RX ADMIN — METOPROLOL TARTRATE 50 MG: 50 TABLET ORAL at 17:26

## 2022-04-24 RX ADMIN — ACETAMINOPHEN 650 MG: 325 TABLET ORAL at 12:19

## 2022-04-24 RX ADMIN — TRAZODONE HYDROCHLORIDE 50 MG: 50 TABLET ORAL at 22:41

## 2022-04-24 RX ADMIN — FAMOTIDINE 20 MG: 20 TABLET ORAL at 17:26

## 2022-04-24 RX ADMIN — METOPROLOL TARTRATE 50 MG: 50 TABLET ORAL at 09:18

## 2022-04-24 RX ADMIN — FENTANYL CITRATE 25 MCG: 0.05 INJECTION, SOLUTION INTRAMUSCULAR; INTRAVENOUS at 22:41

## 2022-04-24 RX ADMIN — FUROSEMIDE 40 MG: 40 TABLET ORAL at 12:19

## 2022-04-24 NOTE — PROGRESS NOTES
Bedside shift change report given to Saundra Petty RN (oncoming nurse) by Sandrine Junior RN (offgoing nurse). Report included the following information SBAR, Kardex, Intake/Output, MAR, Accordion, Recent Results and Cardiac Rhythm NSR.

## 2022-04-24 NOTE — PROGRESS NOTES
Hospitalist Progress Note      Hospital summary: 80 y.o lady w/ cirrhosis, PUD, HTN, who presented with liver hemorrhage after a transjugular liver biopsy. Assessment/Plan:    Hepatic hemorrhage s/p angiogram with coiling. Acute blood loss anemia. -CT ABD/PELV: demonstrated \"active intrahepatic arterial contrast extravasation/bleeding, with surrounding intraparenchymal hematoma. There is large quantity hemoperitoneum\". -monitor H/H - slow downtrend -  S/p PRBC now Hgb >8--> 9.3  -General Surgery following, appreciate recs     Fever 4/22-continue to monitor clinically hold antibiotics for now. Pro-Juanjose negative if spikes again will do panculture. HTN  -continue home metoprolol may need a second agent will add daily Lasix in the context of diastolic dysfunction  -increased PRN IV hydralazine  -BP intermittently elevated continue to monitor adjust accordingly in 24hrs       PUD:   -Continue Pepcid     Cirrhosis:  -No hx of HE, not on home lactulose or rifaximin  -has chronic edema which she states is from venous insufficiency.    -Given one dose of IV furosemide on 4/17 with improvement.   -some diastolic dysfunction noted on echo May add in daily Lasix  -Cleared by Cardiology    Hypokalemia- replace as needed    Code status: full  DVT prophylaxis: SCDs  PTA: home with   Radha Brady at [phone]      Plan: Patient told me she wants to go to SNF Case management consult PT OT possible discharge tomorrow  ----------------------------------------------    CC: abdominal pain    S: Seen and examined no acute complain only postural pain blood pressure remains elevated    O:  Visit Vitals  BP (!) 157/75 (BP 1 Location: Left upper arm, BP Patient Position: Sitting)   Pulse 100   Temp 98.6 °F (37 °C)   Resp 18   Ht 5' 4\" (1.626 m)   Wt 60.1 kg (132 lb 8 oz)   SpO2 94%   Breastfeeding No   BMI 22.74 kg/m²       PHYSICAL EXAM:  Gen: Thin built and nourished  HEENT:  pale conjunctiva  Neck: supple   Heart: RRR,   Lungs:  non-labored respirations  Abd: soft, RUQ tenderness,    Extr: warm  Skin:  no rash  Neuro:  normal speech, moves all extremities  Psych:  Flat affect      Intake/Output Summary (Last 24 hours) at 4/24/2022 0936  Last data filed at 4/24/2022 0354  Gross per 24 hour   Intake --   Output 800 ml   Net -800 ml        Recent labs & imaging reviewed:    Recent Labs     04/24/22  0440   *   K 4.0      CO2 26   BUN 13   CREA 0.47*   GLU 85   CA 8.1*   MG 2.1   PHOS 2.4*       Med list reviewed  Current Facility-Administered Medications   Medication Dose Route Frequency    bisacodyL (DULCOLAX) suppository 10 mg  10 mg Rectal DAILY PRN    metoprolol tartrate (LOPRESSOR) tablet 50 mg  50 mg Oral BID    0.9% sodium chloride infusion 250 mL  250 mL IntraVENous PRN    dilTIAZem (CARDIZEM) injection 5 mg  5 mg IntraVENous PRN    hydrALAZINE (APRESOLINE) 20 mg/mL injection 20 mg  20 mg IntraVENous Q6H PRN    lidocaine 4 % patch 1 Patch  1 Patch TransDERmal Q24H    acetaminophen (TYLENOL) tablet 650 mg  650 mg Oral Q4H PRN    fentaNYL citrate (PF) injection 25 mcg  25 mcg IntraVENous Q2H PRN    traZODone (DESYREL) tablet 50 mg  50 mg Oral QHS    famotidine (PEPCID) tablet 20 mg  20 mg Oral BID    0.9% sodium chloride infusion 250 mL  250 mL IntraVENous PRN    iopamidoL (ISOVUE 300) 61 % contrast injection 50 mL  50 mL IntraCATHeter RAD PRN    0.9% sodium chloride infusion 250 mL  250 mL IntraVENous PRN         Wale Cabello MD  Internal Medicine  Date of Service: 4/24/2022

## 2022-04-25 PROCEDURE — 65270000046 HC RM TELEMETRY

## 2022-04-25 PROCEDURE — 74011250636 HC RX REV CODE- 250/636: Performed by: NURSE PRACTITIONER

## 2022-04-25 PROCEDURE — 74011250637 HC RX REV CODE- 250/637: Performed by: HOSPITALIST

## 2022-04-25 PROCEDURE — 74011250637 HC RX REV CODE- 250/637: Performed by: NURSE PRACTITIONER

## 2022-04-25 PROCEDURE — 97110 THERAPEUTIC EXERCISES: CPT

## 2022-04-25 PROCEDURE — 74011000250 HC RX REV CODE- 250: Performed by: NURSE PRACTITIONER

## 2022-04-25 PROCEDURE — 97116 GAIT TRAINING THERAPY: CPT

## 2022-04-25 RX ADMIN — FENTANYL CITRATE 25 MCG: 0.05 INJECTION, SOLUTION INTRAMUSCULAR; INTRAVENOUS at 21:58

## 2022-04-25 RX ADMIN — FUROSEMIDE 40 MG: 40 TABLET ORAL at 09:22

## 2022-04-25 RX ADMIN — METOPROLOL TARTRATE 50 MG: 50 TABLET ORAL at 09:22

## 2022-04-25 RX ADMIN — FENTANYL CITRATE 25 MCG: 0.05 INJECTION, SOLUTION INTRAMUSCULAR; INTRAVENOUS at 01:39

## 2022-04-25 RX ADMIN — FAMOTIDINE 20 MG: 20 TABLET ORAL at 09:22

## 2022-04-25 RX ADMIN — METOPROLOL TARTRATE 50 MG: 50 TABLET ORAL at 17:23

## 2022-04-25 RX ADMIN — FAMOTIDINE 20 MG: 20 TABLET ORAL at 17:23

## 2022-04-25 RX ADMIN — TRAZODONE HYDROCHLORIDE 50 MG: 50 TABLET ORAL at 21:58

## 2022-04-25 NOTE — PROGRESS NOTES
Bedside and Verbal shift change report given to KENNETH Judd (oncoming nurse) by Nivia Patel (offgoing nurse).  Report included the following information SBAR, Kardex, ED Summary, Procedure Summary, Intake/Output, MAR, Recent Results and Cardiac Rhythm NSr.

## 2022-04-25 NOTE — PROGRESS NOTES
[address] [facility] Adult  Hospitalist Group                                                                                          Hospitalist Progress Note  Hailey Rogers MD  Answering service: [phone] from in house phone        Date of Service:  2022  NAME:  Ranjeet Fuentes  :  1940  MRN:  [id number]      Admission Summary:   80 y.o lady w/ cirrhosis, PUD, HTN, who presented with liver hemorrhage after a transjugular liver biopsy    Interval history / Subjective:   Patient seen and examined stable for discharge awaiting authorization     Assessment & Plan:     Hepatic hemorrhage s/p angiogram with coiling. Acute blood loss anemia. -CT ABD/PELV: demonstrated \"active intrahepatic arterial contrast extravasation/bleeding, with surrounding intraparenchymal hematoma. There is large quantity hemoperitoneum\". -monitor H/H - slow downtrend -  S/p PRBC now Hgb >8--> 9.3  -General Surgery following, appreciate recs     Fever -continue to monitor clinically hold antibiotics for now. Pro-Juanjose negative if spikes again will do panculture.     HTN  -continue home metoprolol may need a second agent will add daily Lasix in the context of diastolic dysfunction  -increased PRN IV hydralazine  -BP intermittently elevated continue to monitor adjust accordingly in 24hrs        PUD:   -Continue Pepcid     Cirrhosis:  -No hx of HE, not on home lactulose or rifaximin  -has chronic edema which she states is from venous insufficiency.    -Given one dose of IV furosemide on  with improvement.   -some diastolic dysfunction noted on echo May add in daily Lasix  -Cleared by Cardiology     Hypokalemia- replace as needed    Code status: Full code  DVT prophylaxis: SCDs    Care Plan discussed with: Patient/Family and Nurse  Anticipated Disposition: SNF/LTC  Anticipated Discharge: 24 hours to 48 hours     Hospital Problems  Date Reviewed: 2022          Codes Class Noted POA    * (Principal) Liver hemorrhage ICD-10-CM: K76.89  ICD-9-CM: 573.8  4/15/2022 Yes                Review of Systems:   A comprehensive review of systems was negative. Vital Signs:    Last 24hrs VS reviewed since prior progress note. Most recent are:  Visit Vitals  BP (!) 146/69   Pulse 74   Temp 98.1 °F (36.7 °C)   Resp 20   Ht 5' 4\" (1.626 m)   Wt 60.3 kg (132 lb 15 oz)   SpO2 93%   Breastfeeding No   BMI 22.82 kg/m²         Intake/Output Summary (Last 24 hours) at 4/25/2022 1238  Last data filed at 4/24/2022 1610  Gross per 24 hour   Intake 240 ml   Output 400 ml   Net -160 ml        Physical Examination:     I had a face to face encounter with this patient and independently examined them on 4/25/2022 as outlined below:          General : alert x 3, awake, no acute distress, resting in bed, pleasant  HEENT: PEERL, EOMI, moist mucus membrane, TM clear  Neck: supple, no JVD, no meningeal signs  Chest: Clear to auscultation bilaterally   CVS: S1 S2 heard, Capillary refill less than 2 seconds  Abd: soft/ Non tender, non distended, BS physiological,   Ext: no clubbing, no cyanosis, no edema, brisk 2+ DP pulses  Neuro/Psych: pleasant mood and affect, CN 2-12 grossly intact    Data Review:    I personally reviewed  Image and labs      Labs:     Recent Labs     04/24/22  0440   WBC 13.7*   HGB 9.3*   HCT 29.2*        Recent Labs     04/24/22  0440   *   K 4.0      CO2 26   BUN 13   CREA 0.47*   GLU 85   CA 8.1*   MG 2.1   PHOS 2.4*     Recent Labs     04/24/22  0440   ALB 2.3*     No results for input(s): INR, PTP, APTT, INREXT in the last 72 hours. No results for input(s): FE, TIBC, PSAT, FERR in the last 72 hours. No results found for: FOL, RBCF   No results for input(s): PH, PCO2, PO2 in the last 72 hours. No results for input(s): CPK, CKNDX, TROIQ in the last 72 hours.     No lab exists for component: CPKMB  No results found for: CHOL, CHOLX, CHLST, CHOLV, HDL, HDLP, LDL, LDLC, DLDLP, TGLX, TRIGL, TRIGP, CHHD, CHHDX  No results found for: GLUCPOC  No results found for: COLOR, APPRN, SPGRU, REFSG, YUSUF, PROTU, GLUCU, KETU, BILU, UROU, BILLIE, LEUKU, GLUKE, EPSU, BACTU, WBCU, RBCU, CASTS, UCRY      Medications Reviewed:     Current Facility-Administered Medications   Medication Dose Route Frequency    bisacodyL (DULCOLAX) suppository 10 mg  10 mg Rectal DAILY PRN    furosemide (LASIX) tablet 40 mg  40 mg Oral DAILY    metoprolol tartrate (LOPRESSOR) tablet 50 mg  50 mg Oral BID    0.9% sodium chloride infusion 250 mL  250 mL IntraVENous PRN    dilTIAZem (CARDIZEM) injection 5 mg  5 mg IntraVENous PRN    hydrALAZINE (APRESOLINE) 20 mg/mL injection 20 mg  20 mg IntraVENous Q6H PRN    lidocaine 4 % patch 1 Patch  1 Patch TransDERmal Q24H    acetaminophen (TYLENOL) tablet 650 mg  650 mg Oral Q4H PRN    fentaNYL citrate (PF) injection 25 mcg  25 mcg IntraVENous Q2H PRN    traZODone (DESYREL) tablet 50 mg  50 mg Oral QHS    famotidine (PEPCID) tablet 20 mg  20 mg Oral BID    0.9% sodium chloride infusion 250 mL  250 mL IntraVENous PRN    iopamidoL (ISOVUE 300) 61 % contrast injection 50 mL  50 mL IntraCATHeter RAD PRN    0.9% sodium chloride infusion 250 mL  250 mL IntraVENous PRN     ______________________________________________________________________  EXPECTED LENGTH OF STAY: 4d 7h  ACTUAL LENGTH OF STAY:          10      Please note that this dictation was completed with [email or web address], the computer voice recognition software. Quite often unanticipated grammatical, syntax, homophones, and other interpretive errors are inadvertently transcribed by the computer software. Please disregard these errors. Please excuse any errors that have escaped final proofreading.                 Anuj Starr MD

## 2022-04-25 NOTE — PROGRESS NOTES
Bedside and Verbal shift change report given to Alaina Leon RN (oncoming nurse) by Bernarda Leslie RN (offgoing nurse). Report included the following information SBAR, Kardex, ED Summary, Procedure Summary, Intake/Output, MAR, Recent Results and Cardiac Rhythm NSR.

## 2022-04-25 NOTE — PROGRESS NOTES
Bedside shift change report given to [facility] (oncoming nurse) by TUAN Chavez (offgoing nurse). Report included the following information SBAR, Kardex, Procedure Summary, MAR and Recent Results.

## 2022-04-25 NOTE — PROGRESS NOTES
[address] visit. Mrs. Jacqueline Bolden verified that she had received communion today from the [employer]. She is hoping to go to rehab soon and wants to go home. Prayer offered for her intention.       VICENTE Lees, RN, ACSW, LCSW   Page:  [phone]

## 2022-04-25 NOTE — PROGRESS NOTES
Problem: Mobility Impaired (Adult and Pediatric)  Goal: *Acute Goals and Plan of Care (Insert Text)  Description: FUNCTIONAL STATUS PRIOR TO ADMISSION: Patient was independent and active without use of DME. When asked she endorsed 3-4 falls in the last 12 months. HOME SUPPORT PRIOR TO ADMISSION: The patient lived with her  but did not require assist. Of note he uses a walker. Physical Therapy Goals  Reassessment completed 4/25/2022 and goals remain appropriate at this time  Initiated 4/17/2022  1. Patient will move from supine to sit and sit to supine , scoot up and down, and roll side to side in bed with independence within 7 day(s). 2.  Patient will transfer from bed to chair and chair to bed with independence using the least restrictive device within 7 day(s). 3.  Patient will perform sit to stand with independence within 7 day(s). 4.  Patient will ambulate with independence for 150 feet with the least restrictive device within 7 day(s). 5.  Patient will ascend/descend 4 stairs with one handrail(s) with modified independence within 7 day(s). Outcome: Progressing Towards Goal   PHYSICAL THERAPY TREATMENT: WEEKLY REASSESSMENT  Patient: Arpit Later (29 y.o. female)  Date: 4/25/2022  Primary Diagnosis: Liver hemorrhage [K76.89]        Precautions:   Fall      ASSESSMENT  Patient continues with skilled PT services and is progressing towards goals. She was up in the chair on arrival and reports that she has been trying to be up in the chair for the day to improve her endurance. Worked on walking with RW and lower body strengthening with variations of sit to stand practice. She is still mildly ROYAL with short to moderate distance ambulation with RW, and she is not able to ambulate even short distances without the walker.       Because she remains below her baseline level of mobility and endurance and her  is not able to assist at all due to his Parkinson's limitations, continue to recommend short rehab stay to improve functional independenece. Patient's progression toward goals since last assessment: improved gait and endurance    Current Level of Function Impacting Discharge (mobility/balance): contact guard    Other factors to consider for discharge:  is not able to assist at all at home         PLAN :  Goals have been updated based on progression since last assessment. Patient continues to benefit from skilled intervention to address the above impairments. Recommendations and Planned Interventions: gait training, therapeutic exercises, patient and family training/education, and therapeutic activities      Frequency/Duration: Patient will be followed by physical therapy:  5 times a week to address goals. Recommendation for discharge: (in order for the patient to meet his/her long term goals)  Therapy up to 5 days/week in SNF setting    This discharge recommendation:  Has been made in collaboration with the attending provider and/or case management    IF patient discharges home will need the following DME: patient owns DME required for discharge         SUBJECTIVE:   Patient stated I do get a little winded walking.     OBJECTIVE DATA SUMMARY:   HISTORY:    Past Medical History:   Diagnosis Date    Abnormal histology findings 5/28/2020    ?  Celiac disease 2019    Arthritis     Chronic pain     arthritis/chronic headache daily    Diarrhea 2/28/2019    Family history of colon cancer requiring screening colonoscopy 1/29/2013    GERD (gastroesophageal reflux disease)     High cholesterol     History of gastric ulcer 7/23/2020    Hypertension     Non-alcoholic fatty liver disease 7624    Periumbilical pain 7/96/7774    Psychiatric disorder     ANXIETY AND DEPRESSION    PUD (peptic ulcer disease)      Past Surgical History:   Procedure Laterality Date    COLONOSCOPY N/A 9/20/2018    COLONOSCOPY performed by Audie Boswell MD at [facility] ENDOSCOPY    COLONOSCOPY N/A 7/23/2020 COLONOSCOPY performed by Koko Berman MD at [facility] ENDOSCOPY    COLONOSCOPY,DIAGNOSTIC  7/23/2020         COLORECTAL SCRN; HI RISK IND  9/20/2018         HX APPENDECTOMY      HX DILATION AND CURETTAGE      miscarriage    HX TONSILLECTOMY      IR BX TRANSCATHETER  4/12/2022    IR OCCL TXCATH HEMMORAGE W SI  4/15/2022    AK CHEST SURGERY PROCEDURE UNLISTED  2000    removal of benign cyst from right lung    UPPER GI ENDOSCOPY,BIOPSY  9/20/2018         UPPER GI ENDOSCOPY,BIOPSY  2/28/2019         UPPER GI ENDOSCOPY,BIOPSY  5/28/2020         UPPER GI ENDOSCOPY,BIOPSY  7/23/2020         UPPER GI ENDOSCOPY,BIOPSY  3/5/2021         UPPER GI ENDOSCOPY,BIOPSY  8/19/2021         UPPER GI ENDOSCOPY,W/DILAT,GASTRIC OUT  2/28/2019            Personal factors and/or comorbidities impacting plan of care:     Home Situation  Home Environment: Private residence  # Steps to Enter: 4  Rails to Enter: Yes  Hand Rails : Left  Wheelchair Ramp: No  One/Two Story Residence: Two story  # of Interior Steps: 12  Interior Rails: Left  Living Alone: No  Support Systems: Spouse/Significant Other  Patient Expects to be Discharged to[de-identified] Skilled nursing facility  Current DME Used/Available at Home: Cane, straight,Shower chair,Walker, rolling  Tub or Shower Type: Shower    EXAMINATION/PRESENTATION/DECISION MAKING:   Critical Behavior:  Neurologic State: Alert  Orientation Level: Oriented X4  Cognition: Appropriate decision making,Appropriate for age attention/concentration,Appropriate safety awareness,Follows commands  Safety/Judgement: Awareness of environment,Fall prevention,Decreased insight into deficits  Hearing:   Auditory  Auditory Impairment: None  Skin:  per nursing  Edema: mild LE edema with legs dependent  Range Of Motion:  AROM: Within functional limits                       Strength:    Strength: Generally decreased, functional                    Tone & Sensation:   Tone: Normal              Sensation: Intact Coordination:  Coordination: Generally decreased, functional  Vision:      Functional Mobility:  Bed Mobility:     Supine to Sit:  (up in chair on arrival and at end of session)        Transfers:  Sit to Stand: Contact guard assistance; Adaptive equipment; Additional time  Stand to Sit: Contact guard assistance; Adaptive equipment; Additional time        Bed to Chair: Contact guard assistance; Adaptive equipment; Additional time              Balance:   Sitting: Intact; Without support  Standing: With support; Impaired (intact with hands on the walker)  Standing - Static: Good  Standing - Dynamic : Good  Ambulation/Gait Training:  Distance (ft): 80 Feet (ft)  Assistive Device: Gait belt;Walker, rolling  Ambulation - Level of Assistance: Contact guard assistance; Adaptive equipment; Additional time        Gait Abnormalities: Decreased step clearance              Speed/Lissett: Pace decreased (<100 feet/min)  Step Length: Right shortened;Left shortened                     Stairs: Therapeutic Exercises:   Sit to stand practice with arm placement variations    Functional Measure:        Pain Rating:  Mild flank pain    Activity Tolerance:   Fair and requires rest breaks    After treatment patient left in no apparent distress:   Sitting in chair, Call bell within reach, and Bed / chair alarm activated    COMMUNICATION/EDUCATION:   The patients plan of care was discussed with: Registered nurse. Fall prevention education was provided and the patient/caregiver indicated understanding., Patient/family have participated as able in goal setting and plan of care. , and Patient/family agree to work toward stated goals and plan of care.     Thank you for this referral.  Genaro Barboza, PT   Time Calculation: 34 mins

## 2022-04-26 VITALS
RESPIRATION RATE: 28 BRPM | BODY MASS INDEX: 22.88 KG/M2 | HEIGHT: 64 IN | WEIGHT: 134.04 LBS | SYSTOLIC BLOOD PRESSURE: 125 MMHG | HEART RATE: 65 BPM | DIASTOLIC BLOOD PRESSURE: 59 MMHG | OXYGEN SATURATION: 96 % | TEMPERATURE: 98.1 F

## 2022-04-26 PROCEDURE — 74011250637 HC RX REV CODE- 250/637: Performed by: NURSE PRACTITIONER

## 2022-04-26 PROCEDURE — 97535 SELF CARE MNGMENT TRAINING: CPT

## 2022-04-26 PROCEDURE — 74011000250 HC RX REV CODE- 250: Performed by: NURSE PRACTITIONER

## 2022-04-26 PROCEDURE — 74011250637 HC RX REV CODE- 250/637: Performed by: HOSPITALIST

## 2022-04-26 RX ORDER — FUROSEMIDE 40 MG/1
40 TABLET ORAL DAILY
Qty: 30 TABLET | Refills: 0 | Status: SHIPPED | OUTPATIENT
Start: 2022-04-26 | End: 2022-05-26

## 2022-04-26 RX ADMIN — ACETAMINOPHEN 650 MG: 325 TABLET ORAL at 09:52

## 2022-04-26 RX ADMIN — METOPROLOL TARTRATE 50 MG: 50 TABLET ORAL at 08:31

## 2022-04-26 RX ADMIN — FAMOTIDINE 20 MG: 20 TABLET ORAL at 08:31

## 2022-04-26 NOTE — DISCHARGE SUMMARY
Discharge Summary       PATIENT ID: Beka Ryan  MRN: [id number]   YOB: 1940    DATE OF ADMISSION: 4/15/2022 11:46 AM    DATE OF DISCHARGE: 4/26/22   PRIMARY CARE PROVIDER: Mack Cordero MD     ATTENDING PHYSICIAN: Laya Russ  DISCHARGING PROVIDER: Bobby Phan MD    To contact this individual call [phone] and ask the  to page. If unavailable ask to be transferred the Adult Hospitalist Department. CONSULTATIONS: IP CONSULT TO GENERAL SURGERY  IP CONSULT TO CARDIOLOGY    PROCEDURES/SURGERIES: * No surgery found *    DISCHARGE DIAGNOSES: Hepatic hemorrhage s/p angiogram with coiling    80 y.o lady w/ cirrhosis, PUD, HTN, who presented with liver hemorrhage after a transjugular liver biopsy    [address]:   Hepatic hemorrhage s/p angiogram with coiling. Acute blood loss anemia. -HGB stable at d/c      Fever 4/22- pro anabelle negative      HTN  -continue home metoprolol may need a second agent will add daily Lasix in the context of diastolic dysfunction     PUD:   -Continue Pepcid     Cirrhosis:  -No hx of HE, not on home lactulose or rifaximin  -on lasix      Hypokalemia- replaced       DISCHARGE DIAGNOSES / PLAN:          BMI: Body mass index is 23.01 kg/m². . This patient: Has a BMI within normal limits. PENDING TEST RESULTS:   At the time of discharge the following test results are still pending:      ADDITIONAL CARE RECOMMENDATIONS:        NOTIFY YOUR PHYSICIAN FOR ANY OF THE FOLLOWING:   Fever over 101 degrees for 24 hours. Chest pain, shortness of breath, fever, chills, nausea, vomiting, diarrhea, change in mentation, falling, weakness, bleeding. Severe pain or pain not relieved by medications, as well as any other signs or symptoms that you may have questions about.     FOLLOW UP APPOINTMENTS:    Follow-up Information     Follow up With Specialties Details Why Oksana Phelan MD Family Medicine In 1 week  [phone] [address] [facility] [address]  [phone]               DIET: Regular Diet    ACTIVITY: Activity as tolerated    EQUIPMENT needed:     DISCHARGE MEDICATIONS:  Current Discharge Medication List      START taking these medications    Details   furosemide (LASIX) 40 mg tablet Take 1 Tablet by mouth daily for 30 days. Qty: 30 Tablet, Refills: 0  Start date: 4/26/2022, End date: 5/26/2022         CONTINUE these medications which have NOT CHANGED    Details   b complex vitamins tablet Take 1 Tablet by mouth daily. famotidine (PEPCID) 40 mg tablet Take 40 mg by mouth two (2) times a day. traZODone (DESYREL) 50 mg tablet Take 50 mg by mouth nightly. metoprolol succinate (TOPROL-XL) 50 mg XL tablet Take 50 mg by mouth daily. baclofen (LIORESAL) 10 mg tablet Take 10 mg by mouth three (3) times daily. vitamin Q-H-O-lutein-minerals (OCUVITE) tablet Take 1 Tab by mouth every Monday, Wednesday, Friday. docosahexanoic acid/epa (FISH OIL PO) Take 1 Cap by mouth daily. multivitamins-minerals-lutein (CENTRUM SILVER) Tab Take 1 Tab by mouth daily. CALCIUM CARBONATE/VITAMIN D3 (CALCIUM + D PO) Take 1 Tab by mouth daily. STOP taking these medications       esomeprazole (NexIUM) 40 mg capsule Comments:   Reason for Stopping:               DISPOSITION:    Home With:   OT  PT  HH  RN      x Long term SNF/Inpatient Rehab    Independent/assisted living    Hospice    Other:       PATIENT CONDITION AT DISCHARGE:     Functional status    Poor    x Deconditioned     Independent      Cognition    x Lucid     Forgetful     Dementia      Catheters/lines (plus indication)    Manjarrez     PICC     PEG    x None      Code status    x Full code     DNR      PHYSICAL EXAMINATION AT DISCHARGE:  General:          Alert, cooperative, no distress, appears stated age.      HEENT:           Atraumatic, anicteric sclerae, pink conjunctivae                          No oral ulcers, mucosa moist, throat clear, dentition fair  Neck:               Supple, symmetrical  Lungs:             Clear to auscultation bilaterally. No Wheezing or Rhonchi. No rales. Chest wall:      No tenderness  No Accessory muscle use. Heart:              Regular  rhythm,  No  murmur   No edema  Abdomen:        Soft, non-tender. Not distended. Bowel sounds normal  Extremities:     No cyanosis. No clubbing,                            Skin turgor normal, Capillary refill normal  Skin:                Not pale. Not Jaundiced  No rashes   Psych:             Not anxious or agitated.   Neurologic:      Alert, moves all extremities, answers questions appropriately and responds to commands       CHRONIC MEDICAL DIAGNOSES:  Problem List as of 4/26/2022 Date Reviewed: 4/21/2022          Codes Class Noted - Resolved    * (Principal) Liver hemorrhage ICD-10-CM: K76.89  ICD-9-CM: 573.8  4/15/2022 - Present        History of gastric ulcer ICD-10-CM: Z87.11  ICD-9-CM: V12.79  7/23/2020 - Present        Periumbilical pain YE-46-SW: R10.33  ICD-9-CM: 789.05  5/28/2020 - Present        Abnormal histology findings ICD-10-CM: R89.7  ICD-9-CM: 795.4  5/28/2020 - Present    Overview Signed 5/28/2020  7:19 AM by Naresh Mccormack MD     ? Celiac disease 2019             Diarrhea ICD-10-CM: R19.7  ICD-9-CM: 787.91  2/28/2019 - Present        Family history of colon cancer requiring screening colonoscopy ICD-10-CM: Z80.0  ICD-9-CM: V16.0  1/29/2013 - Present    Overview Signed 1/29/2013  1:13 PM by Naresh Mccormack MD     (mother)             Dyspepsia ICD-10-CM: R10.13  ICD-9-CM: 536.8  1/29/2013 - Present              Greater than 30  minutes were spent with the patient on counseling and coordination of care    Signed:   Galileo Farah MD  4/26/2022  10:49 AM

## 2022-04-26 NOTE — DISCHARGE INSTRUCTIONS
Discharge Instructions       PATIENT ID: Inrgid Hylton  MRN: [id number]   YOB: 1940    DATE OF ADMISSION: 4/15/2022 11:46 AM    DATE OF DISCHARGE: 4/26/2022    PRIMARY CARE PROVIDER: Lien Marti MD     ATTENDING PHYSICIAN: Ariana Calloway MD  DISCHARGING PROVIDER: Petra Simon MD    To contact this individual call [phone] and ask the  to page. If unavailable ask to be transferred the Adult Hospitalist Department. DISCHARGE DIAGNOSES Hepatic hemorrhage s/p angiogram with coiling    CONSULTATIONS: IP CONSULT TO GENERAL SURGERY  IP CONSULT TO CARDIOLOGY    PROCEDURES/SURGERIES: * No surgery found *    PENDING TEST RESULTS:   At the time of discharge the following test results are still pending:     FOLLOW UP APPOINTMENTS:   Follow-up Information     Follow up With Specialties Details Why Contact Info    Lien Marti MD Family Medicine In 1 week  [address]  [phone]             ADDITIONAL CARE RECOMMENDATIONS:     DIET: Regular Diet      ACTIVITY: Activity as tolerated    WOUND CARE:     EQUIPMENT needed:       Radiology      DISCHARGE MEDICATIONS:   See Medication Reconciliation Form    · It is important that you take the medication exactly as they are prescribed. · Keep your medication in the bottles provided by the pharmacist and keep a list of the medication names, dosages, and times to be taken in your wallet. · Do not take other medications without consulting your doctor. NOTIFY YOUR PHYSICIAN FOR ANY OF THE FOLLOWING:   Fever over 101 degrees for 24 hours. Chest pain, shortness of breath, fever, chills, nausea, vomiting, diarrhea, change in mentation, falling, weakness, bleeding. Severe pain or pain not relieved by medications. Or, any other signs or symptoms that you may have questions about.       DISPOSITION:    Home With:   OT  PT  HH  RN      x SNF/Inpatient Rehab/LTAC    Independent/assisted living    Hospice Other:     CDMP Checked:   Yes x     PROBLEM LIST Updated:  Yes x       Signed:   Lena Duran MD  4/26/2022  10:47 AM

## 2022-04-26 NOTE — PROGRESS NOTES
Problem: Self Care Deficits Care Plan (Adult)  Goal: *Acute Goals and Plan of Care (Insert Text)  Description: FUNCTIONAL STATUS PRIOR TO ADMISSION: Patient was independent and active without use of DME, was highly independent for basic and instrumental ADLs. HOME SUPPORT: The patient lived with her spouse (who is unable to physically assist d/t limitations of his own) but did not require assist.    Occupational Therapy Goals  Initiated 4/18/2022  1. Patient will perform grooming at the sink with supervision/set-up within 7 day(s). 2.  Patient will perform bathing with supervision/set-up within 7 day(s). 3.  Patient will perform upper and lower body dressing with supervision/set-up within 7 day(s). 4.  Patient will perform toilet transfers with supervision/set-up within 7 day(s). 5.  Patient will perform all aspects of toileting with supervision/set-up within 7 day(s). 6.  Patient will participate in upper extremity therapeutic exercise/activities with supervision/set-up for 10 minutes with rest breaks PRN within 7 day(s). 7.  Patient will utilize energy conservation techniques during functional activities with verbal and visual cues within 7 day(s). Outcome: Progressing Towards Goal   OCCUPATIONAL THERAPY TREATMENT  Patient: Tonia Pepper (89 y.o. female)  Date: 4/26/2022  Diagnosis: Liver hemorrhage [K76.89] Liver hemorrhage      Precautions: Fall  Chart, occupational therapy assessment, plan of care, and goals were reviewed. ASSESSMENT  Patient continues with skilled OT services and is progressing towards goals. ADLs limited by bowel incontinence, ognition (complex processing, memory, sequencing), standing tolerance, functional reach, abdominal pain, HTN, pulmonary tolerance and dynamic standing balance. Current Level of Function Impacting Discharge (ADLs): setup upper body ADLs, moderate assistance lower body ADLs, functional mobility supervision.       Other factors to consider for discharge: spouse elderly and just out of hospital         PLAN :  Patient continues to benefit from skilled intervention to address the above impairments. Continue treatment per established plan of care to address goals. Recommend with staff: continued OOB for ADLs, toileting in bathroom    Recommend next OT session: standing and gathering ADLs, bathing    Recommendation for discharge: (in order for the patient to meet his/her long term goals)  Occupational therapy at least 2 days/week in the home assistance with instrumental ADLs    This discharge recommendation:  Has not yet been discussed the attending provider and/or case management    IF patient discharges home will need the following DME: AE: long handled bathing and AE: long handled dressing       SUBJECTIVE:   Patient stated Oh yes, that's right (as to why cannot have [employer] as back up on way home-not hooked to wall suction)    OBJECTIVE DATA SUMMARY:   Cognitive/Behavioral Status:  Neurologic State: Alert; Appropriate for age;Eyes open spontaneously  Orientation Level: Oriented X4  Cognition: Appropriate decision making; Appropriate for age attention/concentration; Appropriate safety awareness; Follows commands             Functional Mobility and Transfers for ADLs:      Transfers:  Sit to Stand: Modified independentsit<>stand chair  Functional Transfers  Bathroom Mobility: Supervision/set up  Toilet Transfer : Modified independent         Balance:  Sitting: Intact; Without support  Standing: Impaired; Without support  Standing - Static: Good  Standing - Dynamic : Fair utilizing furniture for walking    ADL Intervention:                       Patient received sitting in chair and change from DC to SNF to home, patient confused as to where to start. Instruction on benefits staying seated to don clothing, don all while seated and stand one time. Patient asking for double brief 2* bowel incontinence, 1 hour drive.  Patient then with confusion as to next steps to don all clothing, completed dressing in bathroom with prompting to utilize commode prior to donning clothing. Patient still asking for compression stockings and shoes to be donned first. Once in bathroom patient stating after toileting to then don extra brief. Instruction again benefits seated. Patient increased time to doff shoes, demonstrated donning brief L LE with effort R LE stating baseline decreased ROM. Instruction don R LE first. Demonstrated donning pants with moderate assistance don R LE first, cues again to don all while seated lower body and upper body clothing prior to standing to maximize endurance, standing tolerance to complete then grooming task and fall prevention. Lower Body Dressing Assistance  Protective Undergarmet: Moderate assistance  Pants With Elastic Waist: Moderate assistance  Antiembolitic Stockings: Total assistance (dependent)  Slip on Shoes with Back: Total assistance (dependent)  Leg Crossed Method Used: Yes  Position Performed: Seated in chair    Toileting  Bladder Hygiene: Independent  Clothing Management: Modified independent   Brief due to bowel leakage      Therapeutic Exercises:       Pain:      Activity Tolerance:   Fair  Vitals:    04/26/22 0803 04/26/22 1042 04/26/22 1219 04/26/22 1232   BP: (!) 146/61   (!) 125/59   Pulse: 84 70 65 65   Resp: 17   28   Temp: 98.5 °F (36.9 °C)   98.1 °F (36.7 °C)   SpO2: 91%   96%   Weight:       Height:             After treatment patient left in no apparent distress:   Sitting in chair, Call bell within reach and Bed / chair alarm activated    COMMUNICATION/COLLABORATION:   The patients plan of care was discussed with: Registered nurse.      Aristeo Torrez  Time Calculation: 23 mins

## 2022-04-26 NOTE — PROGRESS NOTES
Bedside and Verbal shift change report given to Simran Pickett RN (oncoming nurse) by Jimmy Blackman RN (offgoing nurse). Report included the following information SBAR, Kardex, ED Summary, Procedure Summary, Intake/Output, MAR, Recent Results and Cardiac Rhythm NSR.

## 2022-04-26 NOTE — PROGRESS NOTES
Problem: Pressure Injury - Risk of  Goal: *Prevention of pressure injury  Description: Document Stefan Scale and appropriate interventions in the flowsheet.   Outcome: Progressing Towards Goal  Note: Pressure Injury Interventions:  Sensory Interventions: Assess changes in LOC,Keep linens dry and wrinkle-free,Maintain/enhance activity level    Moisture Interventions: Absorbent underpads,Check for incontinence Q2 hours and as needed,Internal/External urinary devices,Offer toileting Q_hr    Activity Interventions: Increase time out of bed,Pressure redistribution bed/mattress(bed type)    Mobility Interventions: Pressure redistribution bed/mattress (bed type),HOB 30 degrees or less    Nutrition Interventions: Document food/fluid/supplement intake    Friction and Shear Interventions: Minimize layers                Problem: Patient Education: Go to Patient Education Activity  Goal: Patient/Family Education  Outcome: Progressing Towards Goal

## 2022-04-26 NOTE — PROGRESS NOTES
1507- I have reviewed discharge instructions with the patient. The patient verbalized understanding. 1600- Pts ride is here and being discharge.

## 2022-04-26 NOTE — PROGRESS NOTES
Transition of Care  1. RUR 11% Low  2. Disposition Home with family support and outpatient therapy  3. DME Cane and walker  4. Transportation BLS  5. Follow Up PCP, Specialist      CM received phone call from Milvia Donovan requesting updated therapy notes. CM faxed updated PT and MD note to [facility] and directly to [phone]. Milvia Donovan states that they can accept patient today when Rachel Car is approved. CM to monitor. Karel Vang, MS    9:42 Call received from daughter Alejandra Kelley. CM advised auth still pending. Per Alejandra Kelley, patient would like to DC home if Rachel Car is not received today for [place]. CM has not been able to arrange for home health so patient and family have plans to take her outpatient. CM to monitor. Home Health referrals were placed to the following providers and were all denied due to patient's location and or insurance coverage:     [address] [facility] [place] [address]    2:33 CM placed call to Milvia Donovan at [facility]. Auth denied but there is an option for peer to peer, [id number]. CM to update patient and attending. Karel Vang, MS    2:43 CM met with patient to inform of auth denial. Patient wants to DC home with family and outpatient therapy order. Attending updated. CM also notified daughter Alejandra Kelley who confirmed they have hired extra help at home to start Thursday.  on the way to  patient. Medicare pt has received, reviewed, and signed 2nd IM letter informing them of their right to appeal the discharge. Signed copy has been placed on pt bedside chart.       Karel Vang, MS

## 2022-07-19 ENCOUNTER — TRANSCRIBE ORDER (OUTPATIENT)
Dept: SCHEDULING | Age: 82
End: 2022-07-19

## 2022-07-19 DIAGNOSIS — K86.2 CYST OF PANCREAS: ICD-10-CM

## 2022-07-19 DIAGNOSIS — R14.0 BLOATING SYMPTOM: Primary | ICD-10-CM

## 2022-07-28 ENCOUNTER — HOSPITAL ENCOUNTER (OUTPATIENT)
Dept: CT IMAGING | Age: 82
Discharge: HOME | End: 2022-07-28
Attending: PHYSICIAN ASSISTANT
Payer: MEDICARE

## 2022-07-28 DIAGNOSIS — K86.2 CYST OF PANCREAS: ICD-10-CM

## 2022-07-28 DIAGNOSIS — R14.0 BLOATING SYMPTOM: ICD-10-CM

## 2022-07-28 PROCEDURE — 74011000636 HC RX REV CODE- 636: Performed by: PHYSICIAN ASSISTANT

## 2022-07-28 PROCEDURE — 74177 CT ABD & PELVIS W/CONTRAST: CPT

## 2022-07-28 PROCEDURE — 74011000250 HC RX REV CODE- 250: Performed by: PHYSICIAN ASSISTANT

## 2022-07-28 RX ORDER — BARIUM SULFATE 20 MG/ML
900 SUSPENSION ORAL ONCE
Status: COMPLETED | OUTPATIENT
Start: 2022-07-28 | End: 2022-07-28

## 2022-07-28 RX ADMIN — BARIUM SULFATE 900 ML: 20 SUSPENSION ORAL at 12:56

## 2022-07-28 RX ADMIN — IOPAMIDOL 100 ML: 755 INJECTION, SOLUTION INTRAVENOUS at 12:56

## 2022-08-04 ENCOUNTER — HOSPITAL ENCOUNTER (OUTPATIENT)
Dept: GENERAL RADIOLOGY | Age: 82
Discharge: HOME | End: 2022-08-04
Payer: MEDICARE

## 2022-08-04 ENCOUNTER — TRANSCRIBE ORDER (OUTPATIENT)
Dept: REGISTRATION | Age: 82
End: 2022-08-04

## 2022-08-04 DIAGNOSIS — K27.9 PEPTIC ULCER: ICD-10-CM

## 2022-08-04 DIAGNOSIS — R14.0 BLOATING SYMPTOM: ICD-10-CM

## 2022-08-04 DIAGNOSIS — R14.0 BLOATING SYMPTOM: Primary | ICD-10-CM

## 2022-08-04 DIAGNOSIS — K86.2 CYST OF PANCREAS: ICD-10-CM

## 2022-08-04 PROCEDURE — 74018 RADEX ABDOMEN 1 VIEW: CPT

## 2022-09-07 ENCOUNTER — TRANSCRIBE ORDER (OUTPATIENT)
Dept: SCHEDULING | Age: 82
End: 2022-09-07

## 2022-09-08 ENCOUNTER — TRANSCRIBE ORDER (OUTPATIENT)
Dept: SCHEDULING | Age: 82
End: 2022-09-08

## 2022-09-08 DIAGNOSIS — R14.0 BLOATING SYMPTOM: Primary | ICD-10-CM

## 2022-09-08 DIAGNOSIS — K86.2 CYST OF PANCREAS: ICD-10-CM

## 2022-10-07 ENCOUNTER — HOSPITAL ENCOUNTER (OUTPATIENT)
Dept: MRI IMAGING | Age: 82
Discharge: HOME | End: 2022-10-07
Attending: PHYSICIAN ASSISTANT
Payer: MEDICARE

## 2022-10-07 DIAGNOSIS — R14.0 BLOATING SYMPTOM: ICD-10-CM

## 2022-10-07 DIAGNOSIS — K86.2 CYST OF PANCREAS: ICD-10-CM

## 2022-10-07 PROCEDURE — 74183 MRI ABD W/O CNTR FLWD CNTR: CPT

## 2022-10-07 PROCEDURE — A9576 INJ PROHANCE MULTIPACK: HCPCS

## 2022-10-07 PROCEDURE — 74011000258 HC RX REV CODE- 258: Performed by: PHYSICIAN ASSISTANT

## 2022-10-07 PROCEDURE — 74011000250 HC RX REV CODE- 250: Performed by: PHYSICIAN ASSISTANT

## 2022-10-07 PROCEDURE — 74011250636 HC RX REV CODE- 250/636

## 2022-10-07 RX ORDER — SODIUM CHLORIDE 0.9 % (FLUSH) 0.9 %
10 SYRINGE (ML) INJECTION
Status: COMPLETED | OUTPATIENT
Start: 2022-10-07 | End: 2022-10-07

## 2022-10-07 RX ADMIN — GADOTERIDOL 10 ML: 279.3 INJECTION, SOLUTION INTRAVENOUS at 13:28

## 2022-10-07 RX ADMIN — SODIUM CHLORIDE, PRESERVATIVE FREE 10 ML: 5 INJECTION INTRAVENOUS at 13:28

## 2022-10-07 RX ADMIN — SODIUM CHLORIDE 100 ML: 900 INJECTION, SOLUTION INTRAVENOUS at 13:28

## 2024-11-26 NOTE — PROGRESS NOTES
Outpatient Pulmonary Clinic Visit  November 26, 2024      Assessment and Plan:  Zee Mireles is a 85 year old female with a past medical history significant for COPD who presents to clinic today for follow up.    She is doing well on Stiolto therapy and occasional albuterol use.  She is still smoking, and I suspect her cough and mucous are mostly related to this and not likely to respond to aggressive increases in medication.  She is due for a COVID flu shot which we will give today.     #. COPD: stable with no exacerbations in the last year.  Continue Stiolto and as needed albuterol.  #. Cough - likely from tobacco abuse, encourage her to try to quit, but she continues to show signs she is not ready to quit  #. HCM: UTD with vaccines, could get a COVID booster.     RTC in 1 year.     Janie Smith, CNP  Pulmonary Medicine  [facility]   [phone]    CCx:   Chief Complaint   Patient presents with    Follow Up     COPD        HPI:  Zee was last seen in clinic in 11/2023. Since that time she has done well and has no complaints today. No exacerbations in the last year.   She did have a ED visit for a fib with cardioversion in August. NSR since.   Since having COVID slightly increased cough that is mostly dry. Occasionally has some sputum in the AM, she feels this is from PND. Is using a can to get around when outside her the house.   She takes her Stiolto everyday without issue, and uses her albuterol 1-2 times per week at night.      Patient supplied answers from flow sheet for:  COPD Assessment Test (CAT)  2009 [employer] All rights reserved.      11/22/2021    11:00 AM 11/22/2022    11:00 AM 11/27/2023    11:00 AM 11/26/2024    11:30 AM   COPD assessment test (CAT)   Cough 2 2 1 3    Phlegm 3 0 1 1    Chest tightness 0 0 0 0    Walk up hill 1 3 1 0    Limited activities 3 0 0 3    Leaving my home 3 0 0 0    Sleep 4 0 0 0    Energy 3 3 2 3    Total Score 19 8 5 10        Patient-reported      ROS:  10  Transition of Care  1. RUR 11% Low  2. Disposition [facility]  3. DME Cane and walker  4. Transportation BLS  5. Follow Up PCP, Specialist     CM received voice message from [facility] with [facility] and [address] [place] [address], ext [phone]. Auth initiated on Friday and is still pending. CM to follow.      Myrtle Umaña,MS point ROS neg other than the symptoms noted above in the HPI.    Current Meds:  Current Outpatient Medications   Medication Sig Dispense Refill    acebutolol (SECTRAL) 400 MG capsule Take 400 mg by mouth daily      acetaminophen (TYLENOL) 500 MG tablet Take 500-1,000 mg by mouth every 6 hours as needed for mild pain      albuterol (PROAIR HFA/PROVENTIL HFA/VENTOLIN HFA) 108 (90 Base) MCG/ACT inhaler Inhale 2 puffs into the lungs every 4 hours as needed for shortness of breath or wheezing. OFFICE VISIT NEEDED FOR ANY FURTHER REFILLS 18 g 4    amLODIPine (NORVASC) 10 MG tablet Take 10 mg by mouth daily for blood pressure      aspirin 81 MG EC tablet Take 81 mg by mouth daily      brimonidine (ALPHAGAN-P) 0.15 % ophthalmic solution Place 1 drop into both eyes 3 times daily      dorzolamide (TRUSOPT) 2 % ophthalmic solution Place 1 drop into both eyes 3 times daily      latanoprost (XALATAN) 0.005 % ophthalmic solution Place 1 drop into both eyes every evening      levothyroxine (SYNTHROID/LEVOTHROID) 100 MCG tablet Take 1 tablet by mouth every morning (before breakfast)      losartan (COZAAR) 100 MG tablet Take 1 tablet (100 mg) by mouth daily 30 tablet 0    metoprolol succinate ER (TOPROL XL) 25 MG 24 hr tablet TAKE 1 TABLET(25 MG) BY MOUTH DAILY. NEED FOLLOW UP 90 tablet 0    rosuvastatin (CRESTOR) 5 MG tablet Take 5 mg by mouth three times a week Monday, Wednesday, Friday  0    tiotropium-olodaterol (STIOLTO RESPIMAT) 2.5-2.5 MCG/ACT AERS Inhale 2 puffs into the lungs daily. 4 g 11    triamcinolone (ARISTOCORT HP) 0.5 % external cream Apply sparingly twice daily as needed to spot treat itchy areas on arms, legs. 30 g 4       Physical Exam:  /60   Pulse 60   Wt 54.4 kg (120 lb)   SpO2 99%   BMI 21.26 kg/m      Physical Exam  Constitutional:       General: She is not in acute distress.     Appearance: She is not ill-appearing or diaphoretic.   HENT:      Nose: Nose normal.   Cardiovascular:      Rate and  Rhythm: Normal rate and regular rhythm.      Pulses: Normal pulses.      Heart sounds: Normal heart sounds.   Pulmonary:      Effort: Pulmonary effort is normal. No respiratory distress.      Breath sounds: No wheezing or rhonchi.   Musculoskeletal:      Right lower leg: No edema.      Left lower leg: No edema.   Skin:     General: Skin is warm and dry.   Neurological:      Mental Status: She is alert.   Psychiatric:         Behavior: Behavior normal.       Labs:    Lab Results   Component Value Date    WBC 12.3 (H) 08/17/2024    ANEU 4.2 08/14/2019    HGB 14.4 08/17/2024    HCT 42.4 08/17/2024     08/17/2024     (L) 08/17/2024    POTASSIUM 4.2 08/17/2024    CHLORIDE 101 08/17/2024    CO2 22 08/17/2024     (H) 08/17/2024    BUN 13.7 08/17/2024    CR 0.76 08/17/2024    MAG 1.9 08/17/2024    INR 1.06 08/14/2019    BILITOTAL 0.4 04/15/2024    AST 18 04/15/2024    ALT 9 04/15/2024    ALKPHOS 67 04/15/2024    PROTTOTAL 6.9 04/15/2024    ALBUMIN 4.1 04/15/2024       I have personally reviewed all pertinent imaging studies and PFT results unless otherwise noted.    Imaging studies:    XR CHEST 2 VIEWS   10/26/2022 1:39 PM   HISTORY: chest pain  COMPARISON: Radiograph 5/28/2018.                                                                 IMPRESSION: No acute cardiopulmonary disease.    PFTs 7/24/2018  FEV1/FVC is 71 and is normal.  FEV1 is 65% predicted and is reduced.  FVC is 69% predicted and reduced.  There was no improvement in spirometry after a single inhaled dose of bronchodilator.  TLC is 103% predicted and is normal.  RV is 142% predicted and is increased.  DLCO is 52% predicted and is reduced when it   is corrected for hemoglobin.  Impression:  Full Pulmonary Function Test is abnormal.    PFTs are consistent with mild reduction in FEV1 and FVC.  Spirometry is not consistent with reversibility.  There is no hyperinflation.  There is air-trapping.  Diffusion capacity when corrected for  hemoglobin is moderately reduced.

## 2025-03-19 ENCOUNTER — TELEPHONE (OUTPATIENT)
Age: 85
End: 2025-03-19

## 2025-03-19 NOTE — TELEPHONE ENCOUNTER
Patient called in to verify who referred her to see the neurologist.    PSR confirmed to her that it was her PCP.

## (undated) DEVICE — NEONATAL-ADULT SPO2 SENSOR: Brand: NELLCOR

## (undated) DEVICE — SOLIDIFIER MEDC 1200ML -- CONVERT TO 356117

## (undated) DEVICE — BASIN EMSIS 16OZ GRAPHITE PLAS KID SHP MOLD GRAD FOR ORAL

## (undated) DEVICE — SET ADMIN 16ML TBNG L100IN 2 Y INJ SITE IV PIGGY BK DISP

## (undated) DEVICE — Z DISCONTINUED PER MEDLINE LINE GAS SAMPLING O2/CO2 LNG AD 13 FT NSL W/ TBNG FILTERLINE

## (undated) DEVICE — SYR ASSEMB INFL BLLN 60ML --

## (undated) DEVICE — FORCEPS BX L160CM DIA8MM GRSP DISECT CUP TIP NONLOCKING ROT

## (undated) DEVICE — TOWEL 4 PLY TISS 19X30 SUE WHT

## (undated) DEVICE — ELEVIEW

## (undated) DEVICE — CATH IV AUTOGRD BC PNK 20GA 25 -- INSYTE

## (undated) DEVICE — NEEDLE HYPO 18GA L1.5IN PNK S STL HUB POLYPR SHLD REG BVL

## (undated) DEVICE — Device

## (undated) DEVICE — YANKAUER,TAPERED BULBOUS TIP,W/O VENT: Brand: MEDLINE

## (undated) DEVICE — MEDI-VAC YANK SUCT HNDL W/TPRD BULBOUS TIP: Brand: CARDINAL HEALTH

## (undated) DEVICE — BLOCK BITE ENDOSCP AD 21 MM W/ DIL BLU LF DISP

## (undated) DEVICE — BAG SPEC BIOHZRD 10 X 10 IN --

## (undated) DEVICE — 1200 GUARD II KIT W/5MM TUBE W/O VAC TUBE: Brand: GUARDIAN

## (undated) DEVICE — (D)AGENT INJECTN ELEVIEW 10ML -- DISC BY MFG

## (undated) DEVICE — WORKING LENGTH 235CM, WORKING CHANNEL 2.8MM: Brand: RESOLUTION 360 CLIP

## (undated) DEVICE — SYR 3ML LL TIP 1/10ML GRAD --

## (undated) DEVICE — TRAP SUC MUCOUS 70ML -- MEDICHOICE MEDLINE

## (undated) DEVICE — ENDOSCOPIC ULTRASOUND FINE NEEDLE BIOPSY (FNB) DEVICE: Brand: ACQUIRE

## (undated) DEVICE — CONTAINER SPEC 20 ML LID NEUT BUFF FORMALIN 10 % POLYPR STS

## (undated) DEVICE — SOLIDIFIER FLUID 3000 CC ABSORB

## (undated) DEVICE — ELECTRODE,RADIOTRANSLUCENT,FOAM,5PK: Brand: MEDLINE

## (undated) DEVICE — CANN NASAL O2 CAPNOGRAPHY AD -- FILTERLINE

## (undated) DEVICE — ECHOTIP ULTRA, ENDOSCOPIC ULTRASOUND NEEDLE: Brand: ECHOTIP

## (undated) DEVICE — SOLUTION IRRIG 1000ML H2O STRL BLT

## (undated) DEVICE — KIT IV STRT W CHLORAPREP PD 1ML

## (undated) DEVICE — CATH IV AUTOGRD BC BLU 22GA 25 -- INSYTE

## (undated) DEVICE — ENDO CARRY-ON PROCEDURE KIT INCLUDES ENZYMATIC SPONGE, GAUZE, BIOHAZARD LABEL, TRAY, LUBRICANT, DIRTY SCOPE LABEL, WATER LABEL, TRAY, DRAWSTRING PAD, AND DEFENDO 4-PIECE KIT.: Brand: ENDO CARRY-ON PROCEDURE KIT

## (undated) DEVICE — STRAINER URIN CALC RNL MSH -- CONVERT TO ITEM 357634

## (undated) DEVICE — SNARE L240CM LOOP W27MM STIFF WIRE SHT THROW STD OVL M SENS

## (undated) DEVICE — SYR 10ML LUER LOK 1/5ML GRAD --

## (undated) DEVICE — KENDALL RADIOLUCENT FOAM MONITORING ELECTRODE RECTANGULAR SHAPE: Brand: KENDALL

## (undated) DEVICE — NON-REM POLYHESIVE PATIENT RETURN ELECTRODE: Brand: VALLEYLAB

## (undated) DEVICE — ESOPHAGEAL/PYLORIC/COLONIC/BILIARY WIREGUIDED BALLOON DILATATION CATHETER: Brand: CRE™ PRO

## (undated) DEVICE — WRISTBAND ID AD W1XL11.5IN RED POLY ALRG PREPRINTED PERM

## (undated) DEVICE — SYRINGE MED 20ML STD CLR PLAS LUERLOCK TIP N CTRL DISP

## (undated) DEVICE — SNARE ENDOSCP M L240CM W27MM SHTH DIA2.4MM CHN 2.8MM OVL

## (undated) DEVICE — POLYP TRAP: Brand: TRAPEASE®

## (undated) DEVICE — SOLIDIFIER FLD 2OZ 1500CC N DISINF IN BTL DISP SAFESORB

## (undated) DEVICE — Device: Brand: SINGLE USE ASPIRATION NEEDLE

## (undated) DEVICE — BW-412T DISP COMBO CLEANING BRUSH: Brand: SINGLE USE COMBINATION CLEANING BRUSH

## (undated) DEVICE — BW-400L DISP SNGL-END CLEANINGBRUSH: Brand: OLYMPUS

## (undated) DEVICE — KENDALL RADIOLUCENT FOAM MONITORING ELECTRODE -RECTANGULAR SHAPE: Brand: KENDALL

## (undated) DEVICE — BAG BELONG PT PERS CLEAR HANDL

## (undated) DEVICE — BLOCK BITE ENDO --

## (undated) DEVICE — SYR 50ML SLIP TIP NSAF LF STRL --

## (undated) DEVICE — Device: Brand: BALLOON3

## (undated) DEVICE — Device: Brand: SINGLE USE SOFT BRUSH

## (undated) DEVICE — KIT COMPLIANCE W ENDOGLDE + 11 NO BRSH ENDOKT

## (undated) DEVICE — Z DISCONTINUED NO SUB IDED SET EXTN W/ 4 W STPCOCK M SPIN LOK 36IN